# Patient Record
Sex: FEMALE | Race: BLACK OR AFRICAN AMERICAN | NOT HISPANIC OR LATINO
[De-identification: names, ages, dates, MRNs, and addresses within clinical notes are randomized per-mention and may not be internally consistent; named-entity substitution may affect disease eponyms.]

---

## 2017-02-13 ENCOUNTER — APPOINTMENT (OUTPATIENT)
Dept: GASTROENTEROLOGY | Facility: CLINIC | Age: 57
End: 2017-02-13

## 2017-02-13 VITALS
SYSTOLIC BLOOD PRESSURE: 120 MMHG | RESPIRATION RATE: 14 BRPM | HEIGHT: 67 IN | HEART RATE: 80 BPM | DIASTOLIC BLOOD PRESSURE: 74 MMHG | WEIGHT: 237 LBS | BODY MASS INDEX: 37.2 KG/M2

## 2017-02-13 DIAGNOSIS — Z80.0 FAMILY HISTORY OF MALIGNANT NEOPLASM OF DIGESTIVE ORGANS: ICD-10-CM

## 2017-03-31 ENCOUNTER — APPOINTMENT (OUTPATIENT)
Dept: GASTROENTEROLOGY | Facility: AMBULATORY MEDICAL SERVICES | Age: 57
End: 2017-03-31

## 2017-04-21 ENCOUNTER — APPOINTMENT (OUTPATIENT)
Dept: CT IMAGING | Facility: CLINIC | Age: 57
End: 2017-04-21

## 2017-04-21 ENCOUNTER — OUTPATIENT (OUTPATIENT)
Dept: OUTPATIENT SERVICES | Facility: HOSPITAL | Age: 57
LOS: 1 days | End: 2017-04-21
Payer: COMMERCIAL

## 2017-04-21 DIAGNOSIS — K43.2 INCISIONAL HERNIA WITHOUT OBSTRUCTION OR GANGRENE: ICD-10-CM

## 2017-04-21 PROCEDURE — 74150 CT ABDOMEN W/O CONTRAST: CPT

## 2017-10-06 ENCOUNTER — APPOINTMENT (OUTPATIENT)
Dept: OBGYN | Facility: CLINIC | Age: 57
End: 2017-10-06
Payer: COMMERCIAL

## 2017-10-06 VITALS
BODY MASS INDEX: 37.04 KG/M2 | WEIGHT: 236 LBS | HEIGHT: 67 IN | DIASTOLIC BLOOD PRESSURE: 85 MMHG | HEART RATE: 87 BPM | SYSTOLIC BLOOD PRESSURE: 126 MMHG

## 2017-10-06 PROCEDURE — 99213 OFFICE O/P EST LOW 20 MIN: CPT

## 2017-10-06 RX ORDER — SULFAMETHOXAZOLE AND TRIMETHOPRIM 800; 160 MG/1; MG/1
800-160 TABLET ORAL
Qty: 14 | Refills: 0 | Status: DISCONTINUED | COMMUNITY
Start: 2017-07-17

## 2017-10-06 RX ORDER — PHENTERMINE AND TOPIRAMATE 7.5; 46 MG/1; MG/1
7.5-46 CAPSULE, EXTENDED RELEASE ORAL
Qty: 14 | Refills: 0 | Status: DISCONTINUED | COMMUNITY
Start: 2017-07-25

## 2017-10-06 RX ORDER — CEFADROXIL 1000 MG/1
1 TABLET ORAL
Qty: 14 | Refills: 0 | Status: DISCONTINUED | COMMUNITY
Start: 2017-07-17

## 2017-10-09 LAB
CANDIDA VAG CYTO: NOT DETECTED
G VAGINALIS+PREV SP MTYP VAG QL MICRO: NOT DETECTED
T VAGINALIS VAG QL WET PREP: NOT DETECTED

## 2018-01-11 ENCOUNTER — TRANSCRIPTION ENCOUNTER (OUTPATIENT)
Age: 58
End: 2018-01-11

## 2018-01-30 ENCOUNTER — APPOINTMENT (OUTPATIENT)
Dept: OBGYN | Facility: CLINIC | Age: 58
End: 2018-01-30
Payer: COMMERCIAL

## 2018-01-30 VITALS
WEIGHT: 240 LBS | BODY MASS INDEX: 37.67 KG/M2 | DIASTOLIC BLOOD PRESSURE: 78 MMHG | SYSTOLIC BLOOD PRESSURE: 132 MMHG | HEIGHT: 67 IN | HEART RATE: 81 BPM

## 2018-01-30 DIAGNOSIS — Z86.018 PERSONAL HISTORY OF OTHER BENIGN NEOPLASM: ICD-10-CM

## 2018-01-30 DIAGNOSIS — N76.0 ACUTE VAGINITIS: ICD-10-CM

## 2018-01-30 PROCEDURE — 99396 PREV VISIT EST AGE 40-64: CPT

## 2018-01-31 LAB — HPV HIGH+LOW RISK DNA PNL CVX: NOT DETECTED

## 2018-02-03 LAB — CYTOLOGY CVX/VAG DOC THIN PREP: NORMAL

## 2018-02-08 ENCOUNTER — FORM ENCOUNTER (OUTPATIENT)
Age: 58
End: 2018-02-08

## 2018-02-09 ENCOUNTER — APPOINTMENT (OUTPATIENT)
Dept: ULTRASOUND IMAGING | Facility: IMAGING CENTER | Age: 58
End: 2018-02-09
Payer: COMMERCIAL

## 2018-02-09 ENCOUNTER — OUTPATIENT (OUTPATIENT)
Dept: OUTPATIENT SERVICES | Facility: HOSPITAL | Age: 58
LOS: 1 days | End: 2018-02-09
Payer: COMMERCIAL

## 2018-02-09 ENCOUNTER — APPOINTMENT (OUTPATIENT)
Dept: MAMMOGRAPHY | Facility: IMAGING CENTER | Age: 58
End: 2018-02-09
Payer: COMMERCIAL

## 2018-02-09 ENCOUNTER — APPOINTMENT (OUTPATIENT)
Dept: RADIOLOGY | Facility: IMAGING CENTER | Age: 58
End: 2018-02-09
Payer: COMMERCIAL

## 2018-02-09 DIAGNOSIS — Z13.820 ENCOUNTER FOR SCREENING FOR OSTEOPOROSIS: ICD-10-CM

## 2018-02-09 DIAGNOSIS — Z12.31 ENCOUNTER FOR SCREENING MAMMOGRAM FOR MALIGNANT NEOPLASM OF BREAST: ICD-10-CM

## 2018-02-09 PROCEDURE — 76641 ULTRASOUND BREAST COMPLETE: CPT | Mod: 26,50

## 2018-02-09 PROCEDURE — 77063 BREAST TOMOSYNTHESIS BI: CPT | Mod: 26

## 2018-02-09 PROCEDURE — 77080 DXA BONE DENSITY AXIAL: CPT | Mod: 26

## 2018-02-09 PROCEDURE — 77080 DXA BONE DENSITY AXIAL: CPT

## 2018-02-09 PROCEDURE — 77067 SCR MAMMO BI INCL CAD: CPT

## 2018-02-09 PROCEDURE — 77067 SCR MAMMO BI INCL CAD: CPT | Mod: 26

## 2018-02-09 PROCEDURE — 77063 BREAST TOMOSYNTHESIS BI: CPT

## 2018-02-09 PROCEDURE — 76641 ULTRASOUND BREAST COMPLETE: CPT

## 2018-02-12 ENCOUNTER — OTHER (OUTPATIENT)
Age: 58
End: 2018-02-12

## 2018-05-09 ENCOUNTER — TRANSCRIPTION ENCOUNTER (OUTPATIENT)
Age: 58
End: 2018-05-09

## 2018-06-11 ENCOUNTER — TRANSCRIPTION ENCOUNTER (OUTPATIENT)
Age: 58
End: 2018-06-11

## 2018-07-22 PROBLEM — Z80.0 FAMILY HISTORY OF COLON CANCER: Status: INACTIVE | Noted: 2017-02-13

## 2019-02-01 ENCOUNTER — APPOINTMENT (OUTPATIENT)
Dept: OBGYN | Facility: CLINIC | Age: 59
End: 2019-02-01
Payer: COMMERCIAL

## 2019-02-01 VITALS
DIASTOLIC BLOOD PRESSURE: 84 MMHG | SYSTOLIC BLOOD PRESSURE: 132 MMHG | WEIGHT: 248 LBS | BODY MASS INDEX: 38.92 KG/M2 | RESPIRATION RATE: 16 BRPM | HEIGHT: 67 IN | HEART RATE: 78 BPM | OXYGEN SATURATION: 98 %

## 2019-02-01 PROCEDURE — 99396 PREV VISIT EST AGE 40-64: CPT

## 2019-03-19 ENCOUNTER — RESULT CHARGE (OUTPATIENT)
Age: 59
End: 2019-03-19

## 2019-03-19 ENCOUNTER — APPOINTMENT (OUTPATIENT)
Dept: OBGYN | Facility: CLINIC | Age: 59
End: 2019-03-19
Payer: COMMERCIAL

## 2019-03-19 ENCOUNTER — OTHER (OUTPATIENT)
Age: 59
End: 2019-03-19

## 2019-03-19 ENCOUNTER — TRANSCRIPTION ENCOUNTER (OUTPATIENT)
Age: 59
End: 2019-03-19

## 2019-03-19 PROCEDURE — 99211 OFF/OP EST MAY X REQ PHY/QHP: CPT

## 2019-03-20 LAB
BILIRUB UR QL STRIP: NEGATIVE
CLARITY UR: NORMAL
COLLECTION METHOD: NORMAL
GLUCOSE UR-MCNC: NEGATIVE
HCG UR QL: 0.2 EU/DL
HGB UR QL STRIP.AUTO: NORMAL
KETONES UR-MCNC: NEGATIVE
LEUKOCYTE ESTERASE UR QL STRIP: NORMAL
NITRITE UR QL STRIP: NEGATIVE
PH UR STRIP: 5.5
PROT UR STRIP-MCNC: 100
SP GR UR STRIP: 1.02

## 2019-03-24 LAB — BACTERIA UR CULT: ABNORMAL

## 2019-03-25 ENCOUNTER — APPOINTMENT (OUTPATIENT)
Dept: RADIOLOGY | Facility: CLINIC | Age: 59
End: 2019-03-25
Payer: COMMERCIAL

## 2019-03-25 ENCOUNTER — OUTPATIENT (OUTPATIENT)
Dept: OUTPATIENT SERVICES | Facility: HOSPITAL | Age: 59
LOS: 1 days | End: 2019-03-25
Payer: COMMERCIAL

## 2019-03-25 ENCOUNTER — OTHER (OUTPATIENT)
Age: 59
End: 2019-03-25

## 2019-03-25 DIAGNOSIS — Z00.8 ENCOUNTER FOR OTHER GENERAL EXAMINATION: ICD-10-CM

## 2019-03-25 PROCEDURE — 73080 X-RAY EXAM OF ELBOW: CPT | Mod: 26,RT

## 2019-03-25 PROCEDURE — 73080 X-RAY EXAM OF ELBOW: CPT

## 2019-03-26 ENCOUNTER — OTHER (OUTPATIENT)
Age: 59
End: 2019-03-26

## 2019-07-09 ENCOUNTER — EMERGENCY (EMERGENCY)
Facility: HOSPITAL | Age: 59
LOS: 1 days | Discharge: HOME | End: 2019-07-09
Attending: EMERGENCY MEDICINE | Admitting: EMERGENCY MEDICINE
Payer: COMMERCIAL

## 2019-07-09 VITALS
RESPIRATION RATE: 20 BRPM | OXYGEN SATURATION: 100 % | SYSTOLIC BLOOD PRESSURE: 166 MMHG | DIASTOLIC BLOOD PRESSURE: 86 MMHG | TEMPERATURE: 98 F | WEIGHT: 250 LBS | HEIGHT: 68.5 IN | HEART RATE: 90 BPM

## 2019-07-09 DIAGNOSIS — M25.552 PAIN IN LEFT HIP: ICD-10-CM

## 2019-07-09 DIAGNOSIS — Z98.84 BARIATRIC SURGERY STATUS: Chronic | ICD-10-CM

## 2019-07-09 LAB
ALBUMIN SERPL ELPH-MCNC: 4.1 G/DL — SIGNIFICANT CHANGE UP (ref 3.3–5)
ALP SERPL-CCNC: 64 U/L — SIGNIFICANT CHANGE UP (ref 40–120)
ALT FLD-CCNC: 26 U/L — SIGNIFICANT CHANGE UP (ref 10–45)
ANION GAP SERPL CALC-SCNC: 11 MMOL/L — SIGNIFICANT CHANGE UP (ref 5–17)
AST SERPL-CCNC: 21 U/L — SIGNIFICANT CHANGE UP (ref 10–40)
BASOPHILS # BLD AUTO: 0 K/UL — SIGNIFICANT CHANGE UP (ref 0–0.2)
BASOPHILS NFR BLD AUTO: 0.4 % — SIGNIFICANT CHANGE UP (ref 0–2)
BILIRUB SERPL-MCNC: 0.2 MG/DL — SIGNIFICANT CHANGE UP (ref 0.2–1.2)
BUN SERPL-MCNC: 28 MG/DL — HIGH (ref 7–23)
CALCIUM SERPL-MCNC: 8.8 MG/DL — SIGNIFICANT CHANGE UP (ref 8.4–10.5)
CHLORIDE SERPL-SCNC: 103 MMOL/L — SIGNIFICANT CHANGE UP (ref 96–108)
CO2 SERPL-SCNC: 24 MMOL/L — SIGNIFICANT CHANGE UP (ref 22–31)
CREAT SERPL-MCNC: 1.28 MG/DL — SIGNIFICANT CHANGE UP (ref 0.5–1.3)
EOSINOPHIL # BLD AUTO: 0.2 K/UL — SIGNIFICANT CHANGE UP (ref 0–0.5)
EOSINOPHIL NFR BLD AUTO: 3.4 % — SIGNIFICANT CHANGE UP (ref 0–6)
ERYTHROCYTE [SEDIMENTATION RATE] IN BLOOD: 12 MM/HR — SIGNIFICANT CHANGE UP (ref 0–20)
GLUCOSE SERPL-MCNC: 89 MG/DL — SIGNIFICANT CHANGE UP (ref 70–99)
HCT VFR BLD CALC: 41.9 % — SIGNIFICANT CHANGE UP (ref 34.5–45)
HGB BLD-MCNC: 13.6 G/DL — SIGNIFICANT CHANGE UP (ref 11.5–15.5)
LYMPHOCYTES # BLD AUTO: 1.7 K/UL — SIGNIFICANT CHANGE UP (ref 1–3.3)
LYMPHOCYTES # BLD AUTO: 24.5 % — SIGNIFICANT CHANGE UP (ref 13–44)
MCHC RBC-ENTMCNC: 29.3 PG — SIGNIFICANT CHANGE UP (ref 27–34)
MCHC RBC-ENTMCNC: 32.4 GM/DL — SIGNIFICANT CHANGE UP (ref 32–36)
MCV RBC AUTO: 90.3 FL — SIGNIFICANT CHANGE UP (ref 80–100)
MONOCYTES # BLD AUTO: 0.5 K/UL — SIGNIFICANT CHANGE UP (ref 0–0.9)
MONOCYTES NFR BLD AUTO: 7.9 % — SIGNIFICANT CHANGE UP (ref 2–14)
NEUTROPHILS # BLD AUTO: 4.4 K/UL — SIGNIFICANT CHANGE UP (ref 1.8–7.4)
NEUTROPHILS NFR BLD AUTO: 63.8 % — SIGNIFICANT CHANGE UP (ref 43–77)
PLATELET # BLD AUTO: 218 K/UL — SIGNIFICANT CHANGE UP (ref 150–400)
POTASSIUM SERPL-MCNC: 4.3 MMOL/L — SIGNIFICANT CHANGE UP (ref 3.5–5.3)
POTASSIUM SERPL-SCNC: 4.3 MMOL/L — SIGNIFICANT CHANGE UP (ref 3.5–5.3)
PROT SERPL-MCNC: 6.9 G/DL — SIGNIFICANT CHANGE UP (ref 6–8.3)
RBC # BLD: 4.64 M/UL — SIGNIFICANT CHANGE UP (ref 3.8–5.2)
RBC # FLD: 12.1 % — SIGNIFICANT CHANGE UP (ref 10.3–14.5)
SODIUM SERPL-SCNC: 138 MMOL/L — SIGNIFICANT CHANGE UP (ref 135–145)
WBC # BLD: 6.8 K/UL — SIGNIFICANT CHANGE UP (ref 3.8–10.5)
WBC # FLD AUTO: 6.8 K/UL — SIGNIFICANT CHANGE UP (ref 3.8–10.5)

## 2019-07-09 PROCEDURE — 99283 EMERGENCY DEPT VISIT LOW MDM: CPT

## 2019-07-09 PROCEDURE — 80053 COMPREHEN METABOLIC PANEL: CPT

## 2019-07-09 PROCEDURE — 99284 EMERGENCY DEPT VISIT MOD MDM: CPT

## 2019-07-09 PROCEDURE — 85652 RBC SED RATE AUTOMATED: CPT

## 2019-07-09 PROCEDURE — 85027 COMPLETE CBC AUTOMATED: CPT

## 2019-07-09 PROCEDURE — 73502 X-RAY EXAM HIP UNI 2-3 VIEWS: CPT | Mod: 26,LT

## 2019-07-09 PROCEDURE — 73502 X-RAY EXAM HIP UNI 2-3 VIEWS: CPT

## 2019-07-09 RX ORDER — ACETAMINOPHEN 500 MG
975 TABLET ORAL ONCE
Refills: 0 | Status: COMPLETED | OUTPATIENT
Start: 2019-07-09 | End: 2019-07-09

## 2019-07-09 RX ADMIN — Medication 975 MILLIGRAM(S): at 17:27

## 2019-07-09 NOTE — ED ADULT NURSE NOTE - OBJECTIVE STATEMENT
Pt presents for eval of left leg pain and burning sensation, starting in left buttock area and traveling around to anterior thigh, above knee.  No trauma or fall, but states she was dancing the night before.  No redness noted.

## 2019-07-09 NOTE — ED POST DISCHARGE NOTE - NSPOSTDCCALLS_ED_ALL_ED_NU
Called patient to give results. No answer;left voice message.  
Patient called back. Gave her the biopsy results per Dr Koenig. Patient voiced understanding.  
Please call pollo and inform her that her tissue biopsies showed a pre cancerous polyp and a follow up colon is indicated in 5 years. The nodule that I biopsied was benign.     Dr. Koenig  
1

## 2019-07-09 NOTE — ED ADULT NURSE NOTE - NSIMPLEMENTINTERV_GEN_ALL_ED
Implemented All Fall with Harm Risk Interventions:  [place] to call system. Call bell, personal items and telephone within reach. Instruct patient to call for assistance. Room bathroom lighting operational. Non-slip footwear when patient is off stretcher. Physically safe environment: no spills, clutter or unnecessary equipment. Stretcher in lowest position, wheels locked, appropriate side rails in place. Provide visual cue, wrist band, yellow gown, etc. Monitor gait and stability. Monitor for mental status changes and reorient to person, place, and time. Review medications for side effects contributing to fall risk. Reinforce activity limits and safety measures with patient and family. Provide visual clues: red socks.

## 2019-07-09 NOTE — ED PROVIDER NOTE - ATTENDING CONTRIBUTION TO CARE
58y female pmh neg, comes to ed complains of pain left hip past two days. Onset the morning after "I went out dancing" No direct trauma, no fever chills shortness of breath,cp.cough,nvdc,abd pain, rash,gout, Pain worse after few min of walking rad from buttox down back of leg. No weakness numbness.PE WDWN female awake alert normocephalic atraumatic chest clear anterior & posterior abd soft +bs no mass guarding. cv no rubs, gallops or murmurs, Neruo no focal defects. left hip full rom no rash,swelling tenderness. full rom distal neuro vasc intact  Edward Connor MD, Facep

## 2019-07-09 NOTE — ED PROVIDER NOTE - OBJECTIVE STATEMENT
58 year old female with 58 year old female with pmhx HTN presents to ED c/o left hip pain radiating down the left leg x 2 days. Patient states she was out dancing the night of 7/6, 7/7 noticed upon waking was having left sided lower back pain radiating down the left lateral leg. Patient states pain is worse with ambulation. Has been taking diclofenac and flexeril without relief. Denies trauma, fevers, chills, chest pain, sob, abd pain, n/v/d, urinary symptoms

## 2019-07-09 NOTE — ED PROVIDER NOTE - NSFOLLOWUPINSTRUCTIONS_ED_ALL_ED_FT
1. Stay hydrated. Ice 20mins on, 40 mins off for first 48hrs of onset of pain, after that heat in cycle: 20 mins on, 40 mins off. Avoid strenuous activity, twisting and heavy lifting.  2. Take Ibuprofen 600mg every 6hrs for pain as needed- take with food. Alternate Ibuprofen with Tylenol 1g every 6 hours  3. Follow up with your PCP  24-48 hours  4. For continued pain follow up with orthopedics for evaluation   5. Return to ED for worsening of symptoms including fever, weakness, numbness, bowel/urine incontinence and all other concerns.

## 2019-07-09 NOTE — ED PROVIDER NOTE - PROGRESS NOTE DETAILS
Endorsed to Dr SALINA Connor MD, Facep Called lab to see why ESR not released, states pt was on break and didn't see it so running now, will; take 45-1hr. Pt asking to be d./c home. Labs and vitals otherwise unremarkable. Will d/c with ortho follow up and return precautions. Will call if abnl. Dr. Haynes aware and agrees with plan   Vianca Goyal PA-C

## 2019-07-09 NOTE — ED POST DISCHARGE NOTE - OTHER COMMUNICATION
Spoke to patient and informed of normal ESR. Advised to continue w/ d/c plan as advised. - Vianca Goyal PA-C

## 2019-07-09 NOTE — ED PROVIDER NOTE - PHYSICAL EXAMINATION
CONSTITUTIONAL: Patient is awake, alert and oriented x 3. Patient is well appearing and in no acute distress.  HEAD: NCAT,   EYES: PERRL b/l, EOMI,   ENT: Airway patent, Nasal mucosa clear. Mouth with normal mucosa. Throat has no vesicles, no oropharyngeal exudates and uvula is midline.  LUNGS: CTA B/L,  HEART: RRR.+S1S2 no murmurs,   ABDOMEN: Soft nd/nt+bs no rebound or guarding.   EXTREMITY: no edema or calf tenderness b/l, FROM upper and lower ext b/l. Pelvis is stable, no midline cervical, thoracic or lumbar ttp   SKIN: with no rash or lesions.   NEURO: Cn3-12 grossly intact. Strength5/5UE/LE.NmlSensation.

## 2019-07-11 PROBLEM — K21.9 GASTRO-ESOPHAGEAL REFLUX DISEASE WITHOUT ESOPHAGITIS: Chronic | Status: ACTIVE | Noted: 2019-07-09

## 2019-07-11 PROBLEM — I10 ESSENTIAL (PRIMARY) HYPERTENSION: Chronic | Status: ACTIVE | Noted: 2019-07-09

## 2019-07-18 ENCOUNTER — APPOINTMENT (OUTPATIENT)
Dept: ORTHOPEDIC SURGERY | Facility: CLINIC | Age: 59
End: 2019-07-18

## 2019-10-16 ENCOUNTER — FORM ENCOUNTER (OUTPATIENT)
Age: 59
End: 2019-10-16

## 2019-10-17 ENCOUNTER — APPOINTMENT (OUTPATIENT)
Dept: MAMMOGRAPHY | Facility: IMAGING CENTER | Age: 59
End: 2019-10-17
Payer: COMMERCIAL

## 2019-10-17 ENCOUNTER — APPOINTMENT (OUTPATIENT)
Dept: ULTRASOUND IMAGING | Facility: IMAGING CENTER | Age: 59
End: 2019-10-17
Payer: COMMERCIAL

## 2019-10-17 ENCOUNTER — OUTPATIENT (OUTPATIENT)
Dept: OUTPATIENT SERVICES | Facility: HOSPITAL | Age: 59
LOS: 1 days | End: 2019-10-17
Payer: COMMERCIAL

## 2019-10-17 DIAGNOSIS — Z12.39 ENCOUNTER FOR OTHER SCREENING FOR MALIGNANT NEOPLASM OF BREAST: ICD-10-CM

## 2019-10-17 DIAGNOSIS — Z98.84 BARIATRIC SURGERY STATUS: Chronic | ICD-10-CM

## 2019-10-17 PROCEDURE — 77067 SCR MAMMO BI INCL CAD: CPT | Mod: 26

## 2019-10-17 PROCEDURE — 77063 BREAST TOMOSYNTHESIS BI: CPT | Mod: 26

## 2019-10-17 PROCEDURE — 77067 SCR MAMMO BI INCL CAD: CPT

## 2019-10-17 PROCEDURE — 76641 ULTRASOUND BREAST COMPLETE: CPT | Mod: 26,50

## 2019-10-17 PROCEDURE — 76641 ULTRASOUND BREAST COMPLETE: CPT

## 2019-10-17 PROCEDURE — 77063 BREAST TOMOSYNTHESIS BI: CPT

## 2019-12-24 ENCOUNTER — OUTPATIENT (OUTPATIENT)
Dept: OUTPATIENT SERVICES | Facility: HOSPITAL | Age: 59
LOS: 1 days | End: 2019-12-24
Payer: COMMERCIAL

## 2019-12-24 ENCOUNTER — APPOINTMENT (OUTPATIENT)
Dept: CT IMAGING | Facility: IMAGING CENTER | Age: 59
End: 2019-12-24
Payer: COMMERCIAL

## 2019-12-24 DIAGNOSIS — Z98.84 BARIATRIC SURGERY STATUS: ICD-10-CM

## 2019-12-24 DIAGNOSIS — Z98.84 BARIATRIC SURGERY STATUS: Chronic | ICD-10-CM

## 2019-12-24 PROCEDURE — 74177 CT ABD & PELVIS W/CONTRAST: CPT | Mod: 26

## 2019-12-24 PROCEDURE — 82565 ASSAY OF CREATININE: CPT

## 2019-12-24 PROCEDURE — 74177 CT ABD & PELVIS W/CONTRAST: CPT

## 2020-01-16 ENCOUNTER — APPOINTMENT (OUTPATIENT)
Dept: INTERNAL MEDICINE | Facility: CLINIC | Age: 60
End: 2020-01-16
Payer: COMMERCIAL

## 2020-01-16 ENCOUNTER — NON-APPOINTMENT (OUTPATIENT)
Age: 60
End: 2020-01-16

## 2020-01-16 VITALS
WEIGHT: 228 LBS | SYSTOLIC BLOOD PRESSURE: 158 MMHG | HEIGHT: 67 IN | BODY MASS INDEX: 35.79 KG/M2 | HEART RATE: 66 BPM | DIASTOLIC BLOOD PRESSURE: 80 MMHG | RESPIRATION RATE: 15 BRPM

## 2020-01-16 PROCEDURE — 93000 ELECTROCARDIOGRAM COMPLETE: CPT

## 2020-01-16 PROCEDURE — 99204 OFFICE O/P NEW MOD 45 MIN: CPT | Mod: 25

## 2020-01-16 PROCEDURE — 90662 IIV NO PRSV INCREASED AG IM: CPT

## 2020-01-16 PROCEDURE — 36415 COLL VENOUS BLD VENIPUNCTURE: CPT

## 2020-01-16 PROCEDURE — 90471 IMMUNIZATION ADMIN: CPT

## 2020-01-16 RX ORDER — CIPROFLOXACIN HYDROCHLORIDE 500 MG/1
500 TABLET, FILM COATED ORAL TWICE DAILY
Qty: 14 | Refills: 0 | Status: DISCONTINUED | COMMUNITY
Start: 2019-03-19 | End: 2020-01-16

## 2020-01-16 RX ORDER — PANTOPRAZOLE 40 MG/1
40 TABLET, DELAYED RELEASE ORAL
Refills: 0 | Status: DISCONTINUED | COMMUNITY
End: 2020-01-16

## 2020-01-16 RX ORDER — SIMVASTATIN 20 MG/1
20 TABLET, FILM COATED ORAL
Refills: 0 | Status: DISCONTINUED | COMMUNITY
End: 2020-01-16

## 2020-01-16 NOTE — PHYSICAL EXAM
[No Acute Distress] : no acute distress [Well Nourished] : well nourished [Well Developed] : well developed [Well-Appearing] : well-appearing [Normal Sclera/Conjunctiva] : normal sclera/conjunctiva [PERRL] : pupils equal round and reactive to light [Normal Outer Ear/Nose] : the outer ears and nose were normal in appearance [EOMI] : extraocular movements intact [Normal Oropharynx] : the oropharynx was normal [No Lymphadenopathy] : no lymphadenopathy [No JVD] : no jugular venous distention [Thyroid Normal, No Nodules] : the thyroid was normal and there were no nodules present [Supple] : supple [No Respiratory Distress] : no respiratory distress  [No Accessory Muscle Use] : no accessory muscle use [Clear to Auscultation] : lungs were clear to auscultation bilaterally [Normal Rate] : normal rate  [Regular Rhythm] : with a regular rhythm [No Murmur] : no murmur heard [Normal S1, S2] : normal S1 and S2 [No Carotid Bruits] : no carotid bruits [No Abdominal Bruit] : a ~M bruit was not heard ~T in the abdomen [Pedal Pulses Present] : the pedal pulses are present [No Edema] : there was no peripheral edema [No Extremity Clubbing/Cyanosis] : no extremity clubbing/cyanosis [No Palpable Aorta] : no palpable aorta [Soft] : abdomen soft [Non-distended] : non-distended [Non Tender] : non-tender [No Masses] : no abdominal mass palpated [No HSM] : no HSM [Normal Bowel Sounds] : normal bowel sounds [Normal Anterior Cervical Nodes] : no anterior cervical lymphadenopathy [Normal Posterior Cervical Nodes] : no posterior cervical lymphadenopathy [No CVA Tenderness] : no CVA  tenderness [No Spinal Tenderness] : no spinal tenderness [No Joint Swelling] : no joint swelling [Grossly Normal Strength/Tone] : grossly normal strength/tone [Coordination Grossly Intact] : coordination grossly intact [No Rash] : no rash [No Focal Deficits] : no focal deficits [Normal Gait] : normal gait [Deep Tendon Reflexes (DTR)] : deep tendon reflexes were 2+ and symmetric [Normal Affect] : the affect was normal [Alert and Oriented x3] : oriented to person, place, and time [Normal Insight/Judgement] : insight and judgment were intact [de-identified] : Dentures [de-identified] : Varicose veins [de-identified] : Multiple scars throught abdomianal wall but no hernias [de-identified] : Trouble hopping because of weight

## 2020-01-16 NOTE — HISTORY OF PRESENT ILLNESS
[FreeTextEntry1] : 60 yo woman presents to establish care.  She is s/p a Gastric Bypass in august and is having abdominal pain. 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 She has lost about 25 lbs since the procedure.  Unfortunately, after being fine in September-she began to get recurrent mid abdominal pain which radiates to her right side.  She is being evaluated for the pain and it has improved over the past two weeks.  It is not necessarily post prandial.  \par She denies vomiting although she does get nauseated and vagal with the pain but has no diarrhea.  She denies a relationship to urinating, hematuria, or dysuria.  She is waiting for a sonogram.l\par Otherwise, she notes problems with sleep and has a new nocturnal schedule as she has just become a  working the Midnight to 8 shift.  She has a  history of BRAULIO for which she is not using BIPAP.

## 2020-01-16 NOTE — HEALTH RISK ASSESSMENT
[Fair] : ~his/her~ current health as fair  [Good] : ~his/her~  mood as  good [Intercurrent Urgi Care visits] : went to urgent care [No] : No [No falls in past year] : Patient reported no falls in the past year [0] : 2) Feeling down, depressed, or hopeless: Not at all (0) [Patient reported colonoscopy was normal] : Patient reported colonoscopy was normal [Hepatitis C test offered] : Hepatitis C test offered [None] : None [# of Members in Household ___] :  household currently consist of [unfilled] member(s) [Employed] : employed [With Significant Other] : lives with significant other [High School] : high school [# Of Children ___] : has [unfilled] children [] :  [Sexually Active] : sexually active [Feels Safe at Home] : Feels safe at home [Fully functional (bathing, dressing, toileting, transferring, walking, feeding)] : Fully functional (bathing, dressing, toileting, transferring, walking, feeding) [Fully functional (using the telephone, shopping, preparing meals, housekeeping, doing laundry, using] : Fully functional and needs no help or supervision to perform IADLs (using the telephone, shopping, preparing meals, housekeeping, doing laundry, using transportation, managing medications and managing finances) [Carbon Monoxide Detector] : carbon monoxide detector [Smoke Detector] : smoke detector [Reports normal functional visual acuity (ie: able to read med bottle)] : Reports normal functional visual acuity [Safety elements used in home] : safety elements used in home [Seat Belt] :  uses seat belt [Sunscreen] : uses sunscreen [With Patient/Caregiver] : With Patient/Caregiver [Relationship: ___] : Relationship: [unfilled] [Name: ___] : Health Care Proxy's Name: [unfilled]  [de-identified] : for the Flu [FreeTextEntry1] : New job and bell pain [YearQuit] : Stopped smoking more than 10 yeas [de-identified] : Ob:Gyn Dr. Vides; Dr. [address] [de-identified] : Occ social drink [de-identified] : Limited diet post bypass [de-identified] : Work related exercise  [NEO1Bzcpt] : 0 [Behavior] : denies difficulty with behavior [Change in mental status noted] : No change in mental status noted [Learning/Retaining New Information] : denies difficulty learning/retaining new information [Handling Complex Tasks] : denies difficulty handling complex tasks [Reasoning] : denies difficulty with reasoning [Spatial Ability and Orientation] : denies difficulty with spatial ability and orientation [High Risk Behavior] : no high risk behavior [Reports changes in hearing] : Reports no changes in hearing [Reports changes in vision] : Reports no changes in vision [MammogramDate] : 10/19 [BoneDensityDate] : 10/19 [ColonoscopyComments] :  [ColonoscopyDate] : 6/2017 [HIVComments] : HIV already done in the past [FreeTextEntry3] : [place] [de-identified] : Dentures [AdvancecareDate] : 1/16/20

## 2020-01-17 ENCOUNTER — MED ADMIN CHARGE (OUTPATIENT)
Age: 60
End: 2020-01-17

## 2020-01-20 LAB
25(OH)D3 SERPL-MCNC: 51.7 NG/ML
ALBUMIN SERPL ELPH-MCNC: 4.4 G/DL
ALP BLD-CCNC: 62 U/L
ALT SERPL-CCNC: 15 U/L
ANION GAP SERPL CALC-SCNC: 16 MMOL/L
APPEARANCE: CLEAR
AST SERPL-CCNC: 19 U/L
BACTERIA: NEGATIVE
BASOPHILS # BLD AUTO: 0.03 K/UL
BASOPHILS NFR BLD AUTO: 0.4 %
BILIRUB SERPL-MCNC: 0.3 MG/DL
BILIRUBIN URINE: NEGATIVE
BLOOD URINE: NEGATIVE
BUN SERPL-MCNC: 20 MG/DL
CALCIUM SERPL-MCNC: 9.5 MG/DL
CHLORIDE SERPL-SCNC: 101 MMOL/L
CHOLEST SERPL-MCNC: 232 MG/DL
CHOLEST/HDLC SERPL: 2.2 RATIO
CO2 SERPL-SCNC: 25 MMOL/L
COLOR: YELLOW
CREAT SERPL-MCNC: 0.86 MG/DL
EOSINOPHIL # BLD AUTO: 0.11 K/UL
EOSINOPHIL NFR BLD AUTO: 1.5 %
GLUCOSE QUALITATIVE U: NEGATIVE
GLUCOSE SERPL-MCNC: 71 MG/DL
HCT VFR BLD CALC: 41.9 %
HCV AB SER QL: NONREACTIVE
HCV S/CO RATIO: 0.16 S/CO
HDLC SERPL-MCNC: 105 MG/DL
HGB BLD-MCNC: 12.8 G/DL
HYALINE CASTS: 1 /LPF
IMM GRANULOCYTES NFR BLD AUTO: 0.4 %
KETONES URINE: NEGATIVE
LDLC SERPL CALC-MCNC: 112 MG/DL
LEUKOCYTE ESTERASE URINE: NEGATIVE
LYMPHOCYTES # BLD AUTO: 1.48 K/UL
LYMPHOCYTES NFR BLD AUTO: 20.1 %
MAN DIFF?: NORMAL
MCHC RBC-ENTMCNC: 28.4 PG
MCHC RBC-ENTMCNC: 30.5 GM/DL
MCV RBC AUTO: 92.9 FL
MICROSCOPIC-UA: NORMAL
MONOCYTES # BLD AUTO: 0.64 K/UL
MONOCYTES NFR BLD AUTO: 8.7 %
NEUTROPHILS # BLD AUTO: 5.07 K/UL
NEUTROPHILS NFR BLD AUTO: 68.9 %
NITRITE URINE: NEGATIVE
PH URINE: 6
PLATELET # BLD AUTO: 269 K/UL
POTASSIUM SERPL-SCNC: 4.7 MMOL/L
PROT SERPL-MCNC: 6.8 G/DL
PROTEIN URINE: NEGATIVE
RBC # BLD: 4.51 M/UL
RBC # FLD: 13.5 %
RED BLOOD CELLS URINE: 2 /HPF
SODIUM SERPL-SCNC: 142 MMOL/L
SPECIFIC GRAVITY URINE: 1.02
SQUAMOUS EPITHELIAL CELLS: 1 /HPF
T4 SERPL-MCNC: 5.5 UG/DL
TRIGL SERPL-MCNC: 72 MG/DL
TSH SERPL-ACNC: 0.43 UIU/ML
UROBILINOGEN URINE: NORMAL
WBC # FLD AUTO: 7.36 K/UL
WHITE BLOOD CELLS URINE: 2 /HPF

## 2020-01-25 ENCOUNTER — OUTPATIENT (OUTPATIENT)
Dept: OUTPATIENT SERVICES | Facility: HOSPITAL | Age: 60
LOS: 1 days | End: 2020-01-25
Payer: COMMERCIAL

## 2020-01-25 ENCOUNTER — APPOINTMENT (OUTPATIENT)
Dept: ULTRASOUND IMAGING | Facility: IMAGING CENTER | Age: 60
End: 2020-01-25
Payer: COMMERCIAL

## 2020-01-25 DIAGNOSIS — Z00.8 ENCOUNTER FOR OTHER GENERAL EXAMINATION: ICD-10-CM

## 2020-01-25 DIAGNOSIS — Z98.84 BARIATRIC SURGERY STATUS: Chronic | ICD-10-CM

## 2020-01-25 PROCEDURE — 76705 ECHO EXAM OF ABDOMEN: CPT | Mod: 26,RT

## 2020-01-25 PROCEDURE — 76705 ECHO EXAM OF ABDOMEN: CPT

## 2020-02-19 ENCOUNTER — APPOINTMENT (OUTPATIENT)
Dept: INTERNAL MEDICINE | Facility: CLINIC | Age: 60
End: 2020-02-19
Payer: COMMERCIAL

## 2020-02-19 VITALS
BODY MASS INDEX: 35.52 KG/M2 | HEIGHT: 66 IN | WEIGHT: 221 LBS | DIASTOLIC BLOOD PRESSURE: 80 MMHG | TEMPERATURE: 96.9 F | SYSTOLIC BLOOD PRESSURE: 120 MMHG

## 2020-02-19 PROCEDURE — 99214 OFFICE O/P EST MOD 30 MIN: CPT

## 2020-02-19 NOTE — HISTORY OF PRESENT ILLNESS
[FreeTextEntry1] : 60 yo woman with obesity s/p bypass who presents for f/u of her hypertension and abdominal pain. [de-identified] : 60 yo woman with morbid obestiy arthur for the first time previously complaining of abdominal pain which has improved, she has had none for the past 2 weeks and when she last had it, it was more in hre right flank,.  She has been taking her medication and had her bdominal sonogram which aside frrom some liver cysts was normal.

## 2020-02-19 NOTE — PHYSICAL EXAM
[No Acute Distress] : no acute distress [Well Developed] : well developed [Well Nourished] : well nourished [PERRL] : pupils equal round and reactive to light [Well-Appearing] : well-appearing [Normal Sclera/Conjunctiva] : normal sclera/conjunctiva [EOMI] : extraocular movements intact [Normal Outer Ear/Nose] : the outer ears and nose were normal in appearance [No JVD] : no jugular venous distention [Normal Oropharynx] : the oropharynx was normal [No Lymphadenopathy] : no lymphadenopathy [Supple] : supple [Thyroid Normal, No Nodules] : the thyroid was normal and there were no nodules present [No Respiratory Distress] : no respiratory distress  [No Accessory Muscle Use] : no accessory muscle use [Clear to Auscultation] : lungs were clear to auscultation bilaterally [Normal Rate] : normal rate  [Regular Rhythm] : with a regular rhythm [Normal S1, S2] : normal S1 and S2 [No Murmur] : no murmur heard [No Carotid Bruits] : no carotid bruits [No Abdominal Bruit] : a ~M bruit was not heard ~T in the abdomen [No Varicosities] : no varicosities [Pedal Pulses Present] : the pedal pulses are present [No Edema] : there was no peripheral edema [No Extremity Clubbing/Cyanosis] : no extremity clubbing/cyanosis [Soft] : abdomen soft [No Palpable Aorta] : no palpable aorta [Non Tender] : non-tender [Non-distended] : non-distended [No Masses] : no abdominal mass palpated [Normal Bowel Sounds] : normal bowel sounds [No HSM] : no HSM [Normal Posterior Cervical Nodes] : no posterior cervical lymphadenopathy [Normal Anterior Cervical Nodes] : no anterior cervical lymphadenopathy [No CVA Tenderness] : no CVA  tenderness [No Spinal Tenderness] : no spinal tenderness [No Joint Swelling] : no joint swelling [Grossly Normal Strength/Tone] : grossly normal strength/tone [No Rash] : no rash [Coordination Grossly Intact] : coordination grossly intact [No Focal Deficits] : no focal deficits [Normal Gait] : normal gait [Deep Tendon Reflexes (DTR)] : deep tendon reflexes were 2+ and symmetric [Normal Affect] : the affect was normal [Normal Insight/Judgement] : insight and judgment were intact

## 2020-06-16 ENCOUNTER — APPOINTMENT (OUTPATIENT)
Dept: INTERNAL MEDICINE | Facility: CLINIC | Age: 60
End: 2020-06-16

## 2020-07-06 ENCOUNTER — TRANSCRIPTION ENCOUNTER (OUTPATIENT)
Age: 60
End: 2020-07-06

## 2020-07-21 ENCOUNTER — TRANSCRIPTION ENCOUNTER (OUTPATIENT)
Age: 60
End: 2020-07-21

## 2020-07-28 ENCOUNTER — APPOINTMENT (OUTPATIENT)
Dept: INTERNAL MEDICINE | Facility: CLINIC | Age: 60
End: 2020-07-28
Payer: COMMERCIAL

## 2020-07-28 VITALS — SYSTOLIC BLOOD PRESSURE: 120 MMHG | TEMPERATURE: 96.9 F | DIASTOLIC BLOOD PRESSURE: 80 MMHG

## 2020-07-28 VITALS — WEIGHT: 228 LBS | BODY MASS INDEX: 35.79 KG/M2 | HEIGHT: 67 IN

## 2020-07-28 LAB
BASOPHILS # BLD AUTO: 0.03 K/UL
BASOPHILS NFR BLD AUTO: 0.6 %
EOSINOPHIL # BLD AUTO: 0.14 K/UL
EOSINOPHIL NFR BLD AUTO: 2.9 %
HCT VFR BLD CALC: 45.7 %
HGB BLD-MCNC: 13.6 G/DL
IMM GRANULOCYTES NFR BLD AUTO: 0.2 %
LYMPHOCYTES # BLD AUTO: 1.49 K/UL
LYMPHOCYTES NFR BLD AUTO: 30.4 %
MAN DIFF?: NORMAL
MCHC RBC-ENTMCNC: 28.1 PG
MCHC RBC-ENTMCNC: 29.8 GM/DL
MCV RBC AUTO: 94.4 FL
MONOCYTES # BLD AUTO: 0.48 K/UL
MONOCYTES NFR BLD AUTO: 9.8 %
NEUTROPHILS # BLD AUTO: 2.75 K/UL
NEUTROPHILS NFR BLD AUTO: 56.1 %
PLATELET # BLD AUTO: 258 K/UL
RBC # BLD: 4.84 M/UL
RBC # FLD: 12.9 %
URATE SERPL-MCNC: 3.4 MG/DL
WBC # FLD AUTO: 4.9 K/UL

## 2020-07-28 PROCEDURE — 36415 COLL VENOUS BLD VENIPUNCTURE: CPT

## 2020-07-28 PROCEDURE — 99213 OFFICE O/P EST LOW 20 MIN: CPT | Mod: 25

## 2020-07-29 LAB
ALBUMIN SERPL ELPH-MCNC: 4.3 G/DL
ALP BLD-CCNC: 74 U/L
ALT SERPL-CCNC: 24 U/L
ANION GAP SERPL CALC-SCNC: 15 MMOL/L
AST SERPL-CCNC: 22 U/L
BILIRUB SERPL-MCNC: 0.3 MG/DL
BUN SERPL-MCNC: 20 MG/DL
CALCIUM SERPL-MCNC: 9.1 MG/DL
CHLORIDE SERPL-SCNC: 103 MMOL/L
CO2 SERPL-SCNC: 23 MMOL/L
CREAT SERPL-MCNC: 0.96 MG/DL
GLUCOSE SERPL-MCNC: 90 MG/DL
POTASSIUM SERPL-SCNC: 4.7 MMOL/L
PROT SERPL-MCNC: 6.6 G/DL
SODIUM SERPL-SCNC: 142 MMOL/L

## 2020-08-03 ENCOUNTER — TRANSCRIPTION ENCOUNTER (OUTPATIENT)
Age: 60
End: 2020-08-03

## 2020-08-03 LAB
APPEARANCE: CLEAR
BILIRUBIN URINE: NEGATIVE
BLOOD URINE: NEGATIVE
COLOR: YELLOW
GLUCOSE QUALITATIVE U: NEGATIVE
KETONES URINE: NEGATIVE
LEUKOCYTE ESTERASE URINE: NEGATIVE
NITRITE URINE: NEGATIVE
PH URINE: 5.5
PROTEIN URINE: NORMAL
SPECIFIC GRAVITY URINE: 1.02
TSH SERPL-ACNC: 0.86 UIU/ML
UROBILINOGEN URINE: NORMAL

## 2020-08-20 ENCOUNTER — TRANSCRIPTION ENCOUNTER (OUTPATIENT)
Age: 60
End: 2020-08-20

## 2020-09-02 ENCOUNTER — TRANSCRIPTION ENCOUNTER (OUTPATIENT)
Age: 60
End: 2020-09-02

## 2020-09-15 ENCOUNTER — TRANSCRIPTION ENCOUNTER (OUTPATIENT)
Age: 60
End: 2020-09-15

## 2020-09-16 ENCOUNTER — APPOINTMENT (OUTPATIENT)
Dept: INTERNAL MEDICINE | Facility: CLINIC | Age: 60
End: 2020-09-16
Payer: COMMERCIAL

## 2020-09-16 PROCEDURE — G0008: CPT

## 2020-09-16 PROCEDURE — 90686 IIV4 VACC NO PRSV 0.5 ML IM: CPT

## 2020-10-12 ENCOUNTER — APPOINTMENT (OUTPATIENT)
Dept: INTERNAL MEDICINE | Facility: CLINIC | Age: 60
End: 2020-10-12
Payer: COMMERCIAL

## 2020-10-12 PROCEDURE — 99214 OFFICE O/P EST MOD 30 MIN: CPT

## 2020-10-12 RX ORDER — CEFADROXIL 500 MG/1
500 CAPSULE ORAL
Refills: 0 | Status: DISCONTINUED | COMMUNITY
Start: 2020-07-28 | End: 2020-10-12

## 2020-10-12 NOTE — PHYSICAL EXAM
[No Acute Distress] : no acute distress [Normal Sclera/Conjunctiva] : normal sclera/conjunctiva [Normal Oropharynx] : the oropharynx was normal [No JVD] : no jugular venous distention [No Respiratory Distress] : no respiratory distress  [Clear to Auscultation] : lungs were clear to auscultation bilaterally [Normal Percussion] : the chest was normal to percussion [Normal Rate] : normal rate  [Regular Rhythm] : with a regular rhythm [Normal S1, S2] : normal S1 and S2 [No Murmur] : no murmur heard [No Edema] : there was no peripheral edema [Soft] : abdomen soft [Non Tender] : non-tender [No CVA Tenderness] : no CVA  tenderness [No Spinal Tenderness] : no spinal tenderness [Speech Grossly Normal] : speech grossly normal [de-identified] : Obese [de-identified] : Somewhat anxious by her own account

## 2020-10-12 NOTE — HISTORY OF PRESENT ILLNESS
[FreeTextEntry1] : 58 yo woman who is in for an interval visti.  She had been having abdominal pain but has been diagnosed with an ulcer. [de-identified] : 60 yo woman with hypertension and obesity, who had been complaining of diffuse stomach pain and has now been found to have a gastric ulcer (likely marginal) and has been started on Pantoprazole and Sulcrafate for control.  She is scheduled to have a repeat EGD in about one month.\par Her anxiety is better but not totally resolved\par She is working.

## 2020-11-12 ENCOUNTER — TRANSCRIPTION ENCOUNTER (OUTPATIENT)
Age: 60
End: 2020-11-12

## 2020-12-03 ENCOUNTER — TRANSCRIPTION ENCOUNTER (OUTPATIENT)
Age: 60
End: 2020-12-03

## 2020-12-30 ENCOUNTER — APPOINTMENT (OUTPATIENT)
Dept: INTERNAL MEDICINE | Facility: CLINIC | Age: 60
End: 2020-12-30
Payer: COMMERCIAL

## 2020-12-30 PROCEDURE — 99072 ADDL SUPL MATRL&STAF TM PHE: CPT

## 2020-12-30 PROCEDURE — 36415 COLL VENOUS BLD VENIPUNCTURE: CPT

## 2021-01-04 LAB
25(OH)D3 SERPL-MCNC: 43.3 NG/ML
ALBUMIN SERPL ELPH-MCNC: 4.4 G/DL
ALP BLD-CCNC: 87 U/L
ALT SERPL-CCNC: 28 U/L
ANION GAP SERPL CALC-SCNC: 13 MMOL/L
APPEARANCE: CLEAR
AST SERPL-CCNC: 25 U/L
BASOPHILS # BLD AUTO: 0.04 K/UL
BASOPHILS NFR BLD AUTO: 0.7 %
BILIRUB SERPL-MCNC: 0.5 MG/DL
BILIRUBIN URINE: NEGATIVE
BLOOD URINE: NEGATIVE
BUN SERPL-MCNC: 16 MG/DL
CALCIUM SERPL-MCNC: 9.8 MG/DL
CHLORIDE SERPL-SCNC: 103 MMOL/L
CHOLEST SERPL-MCNC: 273 MG/DL
CO2 SERPL-SCNC: 26 MMOL/L
COLOR: YELLOW
CREAT SERPL-MCNC: 0.99 MG/DL
EOSINOPHIL # BLD AUTO: 0.27 K/UL
EOSINOPHIL NFR BLD AUTO: 4.4 %
GLUCOSE QUALITATIVE U: NEGATIVE
GLUCOSE SERPL-MCNC: 82 MG/DL
HBV CORE IGG+IGM SER QL: NONREACTIVE
HCT VFR BLD CALC: 47.1 %
HDLC SERPL-MCNC: 121 MG/DL
HGB BLD-MCNC: 14.2 G/DL
IMM GRANULOCYTES NFR BLD AUTO: 0.2 %
KETONES URINE: NEGATIVE
LDLC SERPL CALC-MCNC: 139 MG/DL
LEUKOCYTE ESTERASE URINE: NEGATIVE
LYMPHOCYTES # BLD AUTO: 1.48 K/UL
LYMPHOCYTES NFR BLD AUTO: 24.1 %
M TB IFN-G BLD-IMP: NEGATIVE
MAN DIFF?: NORMAL
MCHC RBC-ENTMCNC: 28.1 PG
MCHC RBC-ENTMCNC: 30.1 GM/DL
MCV RBC AUTO: 93.3 FL
MEV IGG FLD QL IA: >300 AU/ML
MEV IGG+IGM SER-IMP: POSITIVE
MONOCYTES # BLD AUTO: 0.48 K/UL
MONOCYTES NFR BLD AUTO: 7.8 %
NEUTROPHILS # BLD AUTO: 3.85 K/UL
NEUTROPHILS NFR BLD AUTO: 62.8 %
NITRITE URINE: NEGATIVE
NONHDLC SERPL-MCNC: 151 MG/DL
PH URINE: 6
PLATELET # BLD AUTO: 255 K/UL
POTASSIUM SERPL-SCNC: 4.5 MMOL/L
PROT SERPL-MCNC: 7.3 G/DL
PROTEIN URINE: NEGATIVE
QUANTIFERON TB PLUS MITOGEN MINUS NIL: >10 IU/ML
QUANTIFERON TB PLUS NIL: 0.02 IU/ML
QUANTIFERON TB PLUS TB1 MINUS NIL: 0 IU/ML
QUANTIFERON TB PLUS TB2 MINUS NIL: 0 IU/ML
RBC # BLD: 5.05 M/UL
RBC # FLD: 13.4 %
RUBV IGG FLD-ACNC: 15.5 INDEX
RUBV IGG SER-IMP: POSITIVE
SARS-COV-2 IGG SERPL IA-ACNC: 0.09 INDEX
SARS-COV-2 IGG SERPL QL IA: NEGATIVE
SODIUM SERPL-SCNC: 141 MMOL/L
SPECIFIC GRAVITY URINE: 1.02
T4 SERPL-MCNC: 5.5 UG/DL
TRIGL SERPL-MCNC: 61 MG/DL
TSH SERPL-ACNC: 0.88 UIU/ML
UROBILINOGEN URINE: NORMAL
VZV AB TITR SER: POSITIVE
VZV IGG SER IF-ACNC: 363.6 INDEX
WBC # FLD AUTO: 6.13 K/UL

## 2021-01-14 ENCOUNTER — NON-APPOINTMENT (OUTPATIENT)
Age: 61
End: 2021-01-14

## 2021-01-14 ENCOUNTER — APPOINTMENT (OUTPATIENT)
Dept: INTERNAL MEDICINE | Facility: CLINIC | Age: 61
End: 2021-01-14
Payer: COMMERCIAL

## 2021-01-14 VITALS
OXYGEN SATURATION: 100 % | BODY MASS INDEX: 36.26 KG/M2 | SYSTOLIC BLOOD PRESSURE: 180 MMHG | TEMPERATURE: 98 F | RESPIRATION RATE: 16 BRPM | WEIGHT: 231 LBS | DIASTOLIC BLOOD PRESSURE: 82 MMHG | HEIGHT: 67 IN | HEART RATE: 98 BPM

## 2021-01-14 DIAGNOSIS — K25.3 ACUTE GASTRIC ULCER W/OUT HEMORRHAGE OR PERFORATION: ICD-10-CM

## 2021-01-14 PROCEDURE — 99396 PREV VISIT EST AGE 40-64: CPT | Mod: 25

## 2021-01-14 PROCEDURE — 99072 ADDL SUPL MATRL&STAF TM PHE: CPT

## 2021-01-14 PROCEDURE — 99215 OFFICE O/P EST HI 40 MIN: CPT | Mod: 25

## 2021-01-14 PROCEDURE — 93000 ELECTROCARDIOGRAM COMPLETE: CPT

## 2021-01-14 NOTE — PLAN
[FreeTextEntry1] : 1-Add Chlrothalidone 12.5 mg po daily\par 2-Increase Buspirone to 20 mg po BID\par 3-Consider addition of SSRI to control anxiety as well\par 4-To attend AA meetings again\par To consider further intervention for Alcohol Overuse

## 2021-01-14 NOTE — PHYSICAL EXAM
[No Acute Distress] : no acute distress [Well Nourished] : well nourished [Well-Appearing] : well-appearing [Normal Sclera/Conjunctiva] : normal sclera/conjunctiva [EOMI] : extraocular movements intact [Normal Outer Ear/Nose] : the outer ears and nose were normal in appearance [No JVD] : no jugular venous distention [Supple] : supple [No Respiratory Distress] : no respiratory distress  [No Accessory Muscle Use] : no accessory muscle use [Clear to Auscultation] : lungs were clear to auscultation bilaterally [Normal Rate] : normal rate  [Regular Rhythm] : with a regular rhythm [Normal S1, S2] : normal S1 and S2 [Pedal Pulses Present] : the pedal pulses are present [No Edema] : there was no peripheral edema [No Masses] : no palpable masses [Soft] : abdomen soft [Non Tender] : non-tender [Non-distended] : non-distended [Normal] : no posterior cervical lymphadenopathy and no anterior cervical lymphadenopathy [No CVA Tenderness] : no CVA  tenderness [Scoliosis] : scoliosis [No Rash] : no rash [No Focal Deficits] : no focal deficits [Alert and Oriented x3] : oriented to person, place, and time [Normal Mood] : the mood was normal [Normal Insight/Judgement] : insight and judgment were intact [de-identified] : chronic stasis/venous insufficiency [de-identified] : Crepitus in the left knee without effusion [de-identified] : Dry

## 2021-01-14 NOTE — REVIEW OF SYSTEMS
[Recent Change In Weight] : ~T recent weight change [Joint Pain] : joint pain [Back Pain] : back pain [Insomnia] : insomnia [Anxiety] : anxiety [Negative] : Heme/Lymph [Suicidal] : not suicidal [Depression] : no depression [FreeTextEntry9] : Knee pain

## 2021-01-14 NOTE — HISTORY OF PRESENT ILLNESS
[FreeTextEntry1] : 60  presents for a comprehensive and preventive exam - who complains of anxiety and left knee pain. [de-identified] : 59 yo woman  s/p weight loss surgery complicated a peptic ulcer who is still obese  presents for a comprehensive exam complaining of generalized anxiety.  In addition, she has left knee pain and has been started on Meloxicam by Dr. Gonzalez.  She notes that she is drinking alcohol sometimes as much as a pint/day and agrees that it is excessive.\par She is concerned about the environmental stressors, her family, as well as the realities of life such as the civil unrest and the pandemic.

## 2021-01-14 NOTE — HEALTH RISK ASSESSMENT
[Fair] : ~his/her~ current health as fair  [Good] : ~his/her~  mood as  good [Intercurrent ED visits] : went to ED [Yes] : Yes [No falls in past year] : Patient reported no falls in the past year [HIV test declined] : HIV test declined [None] : None [With Family] : lives with family [# of Members in Household ___] :  household currently consist of [unfilled] member(s) [High School] : high school [Sexually Active] : sexually active [] :  [Feels Safe at Home] : Feels safe at home [Fully functional (bathing, dressing, toileting, transferring, walking, feeding)] : Fully functional (bathing, dressing, toileting, transferring, walking, feeding) [Fully functional (using the telephone, shopping, preparing meals, housekeeping, doing laundry, using] : Fully functional and needs no help or supervision to perform IADLs (using the telephone, shopping, preparing meals, housekeeping, doing laundry, using transportation, managing medications and managing finances) [Reports normal functional visual acuity (ie: able to read med bottle)] : Reports normal functional visual acuity [Reports changes in dental health] : Reports changes in dental health [Smoke Detector] : smoke detector [Carbon Monoxide Detector] : carbon monoxide detector [Safety elements used in home] : safety elements used in home [Seat Belt] :  uses seat belt [Sunscreen] : uses sunscreen [] : No [de-identified] : Brian for exercise [de-identified] : Lunch and Dinner substantial meals; no snacks [Language] : denies difficulty with language [Behavior] : denies difficulty with behavior [Learning/Retaining New Information] : denies difficulty learning/retaining new information [Handling Complex Tasks] : denies difficulty handling complex tasks [Reasoning] : denies difficulty with reasoning [Spatial Ability and Orientation] : denies difficulty with spatial ability and orientation [High Risk Behavior] : no high risk behavior [Reports changes in hearing] : Reports no changes in hearing [Reports changes in vision] : Reports no changes in vision [MammogramDate] : 2/10 [ColonoscopyDate] : 6/2015 [de-identified] : Granchildren and son as well  [FreeTextEntry2] : Working 19 hours/day; off every other weekend

## 2021-02-02 LAB
AMPHET UR-MCNC: NEGATIVE
BARBITURATES UR-MCNC: NEGATIVE
BENZODIAZ UR-MCNC: NEGATIVE
COCAINE METAB.OTHER UR-MCNC: NEGATIVE
CREATININE, URINE: 122.9 MG/DL
METHADONE UR-MCNC: NEGATIVE
METHAQUALONE UR-MCNC: NEGATIVE
OPIATES UR-MCNC: NEGATIVE
PCP UR-MCNC: NEGATIVE
PROPOXYPH UR QL: NEGATIVE
THC UR QL: NEGATIVE

## 2021-02-10 ENCOUNTER — APPOINTMENT (OUTPATIENT)
Dept: ULTRASOUND IMAGING | Facility: IMAGING CENTER | Age: 61
End: 2021-02-10
Payer: COMMERCIAL

## 2021-02-10 ENCOUNTER — APPOINTMENT (OUTPATIENT)
Dept: MAMMOGRAPHY | Facility: IMAGING CENTER | Age: 61
End: 2021-02-10
Payer: COMMERCIAL

## 2021-02-10 ENCOUNTER — RESULT REVIEW (OUTPATIENT)
Age: 61
End: 2021-02-10

## 2021-02-10 ENCOUNTER — OUTPATIENT (OUTPATIENT)
Dept: OUTPATIENT SERVICES | Facility: HOSPITAL | Age: 61
LOS: 1 days | End: 2021-02-10
Payer: COMMERCIAL

## 2021-02-10 DIAGNOSIS — N60.19 DIFFUSE CYSTIC MASTOPATHY OF UNSPECIFIED BREAST: ICD-10-CM

## 2021-02-10 DIAGNOSIS — Z00.8 ENCOUNTER FOR OTHER GENERAL EXAMINATION: ICD-10-CM

## 2021-02-10 DIAGNOSIS — Z00.00 ENCOUNTER FOR GENERAL ADULT MEDICAL EXAMINATION WITHOUT ABNORMAL FINDINGS: ICD-10-CM

## 2021-02-10 DIAGNOSIS — R92.2 INCONCLUSIVE MAMMOGRAM: ICD-10-CM

## 2021-02-10 DIAGNOSIS — Z98.84 BARIATRIC SURGERY STATUS: Chronic | ICD-10-CM

## 2021-02-10 PROCEDURE — 77067 SCR MAMMO BI INCL CAD: CPT | Mod: 26

## 2021-02-10 PROCEDURE — 76641 ULTRASOUND BREAST COMPLETE: CPT | Mod: 26,50

## 2021-02-10 PROCEDURE — 77063 BREAST TOMOSYNTHESIS BI: CPT | Mod: 26

## 2021-02-10 PROCEDURE — 77063 BREAST TOMOSYNTHESIS BI: CPT

## 2021-02-10 PROCEDURE — 76641 ULTRASOUND BREAST COMPLETE: CPT

## 2021-02-10 PROCEDURE — 77067 SCR MAMMO BI INCL CAD: CPT

## 2021-02-18 ENCOUNTER — APPOINTMENT (OUTPATIENT)
Dept: INTERNAL MEDICINE | Facility: CLINIC | Age: 61
End: 2021-02-18

## 2021-02-21 NOTE — PHYSICAL EXAM
[Appropriately responsive] : appropriately responsive [No Acute Distress] : no acute distress [Alert] : alert [No Lymphadenopathy] : no lymphadenopathy [Soft] : soft [Non-tender] : non-tender [Non-distended] : non-distended [No HSM] : No HSM [No Lesions] : no lesions [No Mass] : no mass [Oriented x3] : oriented x3 [Examination Of The Breasts] : a normal appearance [No Masses] : no breast masses were palpable [Labia Majora] : normal [Labia Minora] : normal [Uterine Adnexae] : normal [Normal] : normal

## 2021-02-22 ENCOUNTER — RX CHANGE (OUTPATIENT)
Age: 61
End: 2021-02-22

## 2021-02-22 ENCOUNTER — RX RENEWAL (OUTPATIENT)
Age: 61
End: 2021-02-22

## 2021-02-24 ENCOUNTER — APPOINTMENT (OUTPATIENT)
Dept: INTERNAL MEDICINE | Facility: CLINIC | Age: 61
End: 2021-02-24

## 2021-02-25 ENCOUNTER — APPOINTMENT (OUTPATIENT)
Dept: OBGYN | Facility: CLINIC | Age: 61
End: 2021-02-25
Payer: COMMERCIAL

## 2021-02-25 VITALS
SYSTOLIC BLOOD PRESSURE: 130 MMHG | OXYGEN SATURATION: 100 % | BODY MASS INDEX: 35.47 KG/M2 | WEIGHT: 226 LBS | HEIGHT: 67 IN | HEART RATE: 98 BPM | TEMPERATURE: 97 F | DIASTOLIC BLOOD PRESSURE: 80 MMHG

## 2021-02-25 PROCEDURE — 99072 ADDL SUPL MATRL&STAF TM PHE: CPT

## 2021-02-25 PROCEDURE — 99396 PREV VISIT EST AGE 40-64: CPT

## 2021-02-27 LAB — HPV HIGH+LOW RISK DNA PNL CVX: NOT DETECTED

## 2021-03-01 LAB — CYTOLOGY CVX/VAG DOC THIN PREP: NORMAL

## 2021-03-17 ENCOUNTER — APPOINTMENT (OUTPATIENT)
Dept: INTERNAL MEDICINE | Facility: CLINIC | Age: 61
End: 2021-03-17
Payer: COMMERCIAL

## 2021-03-17 VITALS
HEIGHT: 67 IN | BODY MASS INDEX: 36.1 KG/M2 | RESPIRATION RATE: 16 BRPM | WEIGHT: 230 LBS | HEART RATE: 74 BPM | DIASTOLIC BLOOD PRESSURE: 84 MMHG | TEMPERATURE: 98.1 F | SYSTOLIC BLOOD PRESSURE: 150 MMHG

## 2021-03-17 PROCEDURE — 99072 ADDL SUPL MATRL&STAF TM PHE: CPT

## 2021-03-17 PROCEDURE — 99214 OFFICE O/P EST MOD 30 MIN: CPT

## 2021-03-17 NOTE — HISTORY OF PRESENT ILLNESS
[FreeTextEntry1] : 61 yo woman presents for a f/u for her BP and anxiety. [de-identified] : 59 yo woman with GERD, obestiy s/p Gastric Surgery, hypertension, and anxiety who is feeling well.  She thinks her BP is good as she has no symptoms.  Her anxiety is better with the Buspirone although she does get some vague symptoms for about 30 minutes after taking it.  She thinks it is working for her.

## 2021-04-26 ENCOUNTER — APPOINTMENT (OUTPATIENT)
Dept: OBGYN | Facility: CLINIC | Age: 61
End: 2021-04-26

## 2021-04-28 LAB — BACTERIA UR CULT: NORMAL

## 2021-07-15 ENCOUNTER — APPOINTMENT (OUTPATIENT)
Dept: INTERNAL MEDICINE | Facility: CLINIC | Age: 61
End: 2021-07-15

## 2021-07-20 ENCOUNTER — RX RENEWAL (OUTPATIENT)
Age: 61
End: 2021-07-20

## 2021-07-27 ENCOUNTER — APPOINTMENT (OUTPATIENT)
Dept: INTERNAL MEDICINE | Facility: CLINIC | Age: 61
End: 2021-07-27
Payer: COMMERCIAL

## 2021-07-27 PROCEDURE — 36415 COLL VENOUS BLD VENIPUNCTURE: CPT

## 2021-07-27 PROCEDURE — 99072 ADDL SUPL MATRL&STAF TM PHE: CPT

## 2021-07-27 PROCEDURE — 99214 OFFICE O/P EST MOD 30 MIN: CPT | Mod: 25

## 2021-07-27 RX ORDER — BUSPIRONE HYDROCHLORIDE 5 MG/1
5 TABLET ORAL
Qty: 90 | Refills: 3 | Status: DISCONTINUED | COMMUNITY
Start: 2021-07-20 | End: 2021-07-27

## 2021-07-27 NOTE — HISTORY OF PRESENT ILLNESS
[FreeTextEntry1] : 59 yo woman with hypertension and obesity s/p Bariatric Surgery presents for f/u.  She is feeling well but noted some ankle swelling [de-identified] : 59 yo woman with obesity and hypertension s/p Bariatric Surgery presents for an interval visit.  She notes some ankle swelling which has resolved after a trip to [place] recently.  She denies pain ans swelling was bilateral.\par She otherwise feels well a

## 2021-07-27 NOTE — PHYSICAL EXAM
[No Acute Distress] : no acute distress [Well Developed] : well developed [Normal Sclera/Conjunctiva] : normal sclera/conjunctiva [No JVD] : no jugular venous distention [No Respiratory Distress] : no respiratory distress  [Clear to Auscultation] : lungs were clear to auscultation bilaterally [Normal Percussion] : the chest was normal to percussion [Normal Rate] : normal rate  [Regular Rhythm] : with a regular rhythm [Normal S1, S2] : normal S1 and S2 [No Edema] : there was no peripheral edema [Soft] : abdomen soft [Non Tender] : non-tender [No CVA Tenderness] : no CVA  tenderness [No Spinal Tenderness] : no spinal tenderness [Normal Mood] : the mood was normal [de-identified] : Still periods of Anxiety with good relief with Buspirone

## 2021-07-28 LAB
ANION GAP SERPL CALC-SCNC: 11 MMOL/L
BUN SERPL-MCNC: 25 MG/DL
CALCIUM SERPL-MCNC: 9.5 MG/DL
CHLORIDE SERPL-SCNC: 105 MMOL/L
CO2 SERPL-SCNC: 26 MMOL/L
CREAT SERPL-MCNC: 1.12 MG/DL
GLUCOSE SERPL-MCNC: 113 MG/DL
POTASSIUM SERPL-SCNC: 4.5 MMOL/L
SODIUM SERPL-SCNC: 142 MMOL/L

## 2021-10-05 ENCOUNTER — APPOINTMENT (OUTPATIENT)
Dept: INTERNAL MEDICINE | Facility: CLINIC | Age: 61
End: 2021-10-05
Payer: COMMERCIAL

## 2021-10-05 PROCEDURE — 90471 IMMUNIZATION ADMIN: CPT

## 2021-10-05 PROCEDURE — 36415 COLL VENOUS BLD VENIPUNCTURE: CPT

## 2021-10-05 PROCEDURE — 90656 IIV3 VACC NO PRSV 0.5 ML IM: CPT

## 2021-10-05 PROCEDURE — 99214 OFFICE O/P EST MOD 30 MIN: CPT | Mod: 25

## 2021-10-06 LAB
ABO + RH PNL BLD: NORMAL
ANION GAP SERPL CALC-SCNC: 10 MMOL/L
BUN SERPL-MCNC: 20 MG/DL
CALCIUM SERPL-MCNC: 9.2 MG/DL
CHLORIDE SERPL-SCNC: 104 MMOL/L
CO2 SERPL-SCNC: 25 MMOL/L
CREAT SERPL-MCNC: 0.91 MG/DL
GLUCOSE SERPL-MCNC: 109 MG/DL
POTASSIUM SERPL-SCNC: 4.8 MMOL/L
SODIUM SERPL-SCNC: 139 MMOL/L
TSH SERPL-ACNC: 0.79 UIU/ML

## 2021-10-29 ENCOUNTER — RX CHANGE (OUTPATIENT)
Age: 61
End: 2021-10-29

## 2021-11-17 ENCOUNTER — APPOINTMENT (OUTPATIENT)
Dept: INTERNAL MEDICINE | Facility: CLINIC | Age: 61
End: 2021-11-17
Payer: COMMERCIAL

## 2021-11-17 PROCEDURE — 99214 OFFICE O/P EST MOD 30 MIN: CPT

## 2021-11-17 NOTE — HISTORY OF PRESENT ILLNESS
[FreeTextEntry1] : 61 yo woman presents for a f/u on her BP and response to sleeping medication.  She states that both are better. [de-identified] : 59 yo woman with obesity, hypertension, insomnia partially related to BRAULIO presenting for a check on medication changes.  She is sleeping well and is sticking to her anti hypertensive regime.\par She has picked up a few pounds but will get back to her diet.  She has no stomach pain.

## 2021-11-17 NOTE — PHYSICAL EXAM
[No Acute Distress] : no acute distress [Well Nourished] : well nourished [Well-Appearing] : well-appearing [Normal Sclera/Conjunctiva] : normal sclera/conjunctiva [No JVD] : no jugular venous distention [Clear to Auscultation] : lungs were clear to auscultation bilaterally [Normal Rate] : normal rate  [Regular Rhythm] : with a regular rhythm [No Edema] : there was no peripheral edema [Soft] : abdomen soft [Non Tender] : non-tender [No CVA Tenderness] : no CVA  tenderness [No Spinal Tenderness] : no spinal tenderness

## 2021-11-17 NOTE — HISTORY OF PRESENT ILLNESS
[FreeTextEntry8] : 61 yo woman presents after noting that she was dyspneic on exertion after having a mild case of Covid in August.  Her dyspnea has gone away but she now complains of persistent anxiety and insomnia since.  She has been losing weight and has increased her Buspirone without effect.\par She denies chest pain but has been somewhat non-adherent to using her diuretic.

## 2021-11-18 ENCOUNTER — RX RENEWAL (OUTPATIENT)
Age: 61
End: 2021-11-18

## 2022-01-10 ENCOUNTER — RX RENEWAL (OUTPATIENT)
Age: 62
End: 2022-01-10

## 2022-01-10 ENCOUNTER — APPOINTMENT (OUTPATIENT)
Dept: INTERNAL MEDICINE | Facility: CLINIC | Age: 62
End: 2022-01-10
Payer: COMMERCIAL

## 2022-01-10 PROCEDURE — 36415 COLL VENOUS BLD VENIPUNCTURE: CPT

## 2022-01-11 LAB
25(OH)D3 SERPL-MCNC: 43.6 NG/ML
ALBUMIN SERPL ELPH-MCNC: 4.2 G/DL
ALP BLD-CCNC: 76 U/L
ALT SERPL-CCNC: 22 U/L
ANION GAP SERPL CALC-SCNC: 15 MMOL/L
APPEARANCE: CLEAR
AST SERPL-CCNC: 21 U/L
BASOPHILS # BLD AUTO: 0.02 K/UL
BASOPHILS NFR BLD AUTO: 0.4 %
BILIRUB SERPL-MCNC: 0.5 MG/DL
BILIRUBIN URINE: NEGATIVE
BLOOD URINE: NEGATIVE
BUN SERPL-MCNC: 19 MG/DL
CALCIUM SERPL-MCNC: 9.4 MG/DL
CHLORIDE SERPL-SCNC: 103 MMOL/L
CO2 SERPL-SCNC: 24 MMOL/L
COLOR: YELLOW
CREAT SERPL-MCNC: 0.97 MG/DL
EOSINOPHIL # BLD AUTO: 0.18 K/UL
EOSINOPHIL NFR BLD AUTO: 3.8 %
GLUCOSE QUALITATIVE U: NEGATIVE
GLUCOSE SERPL-MCNC: 94 MG/DL
HCT VFR BLD CALC: 46.1 %
HGB BLD-MCNC: 14.1 G/DL
IMM GRANULOCYTES NFR BLD AUTO: 0.4 %
KETONES URINE: NEGATIVE
LEUKOCYTE ESTERASE URINE: NEGATIVE
LYMPHOCYTES # BLD AUTO: 1.61 K/UL
LYMPHOCYTES NFR BLD AUTO: 33.8 %
MAN DIFF?: NORMAL
MCHC RBC-ENTMCNC: 29 PG
MCHC RBC-ENTMCNC: 30.6 GM/DL
MCV RBC AUTO: 94.9 FL
MONOCYTES # BLD AUTO: 0.49 K/UL
MONOCYTES NFR BLD AUTO: 10.3 %
NEUTROPHILS # BLD AUTO: 2.44 K/UL
NEUTROPHILS NFR BLD AUTO: 51.3 %
NITRITE URINE: NEGATIVE
PH URINE: 6
PLATELET # BLD AUTO: 254 K/UL
POTASSIUM SERPL-SCNC: 4.3 MMOL/L
PROT SERPL-MCNC: 6.9 G/DL
PROTEIN URINE: NEGATIVE
RBC # BLD: 4.86 M/UL
RBC # FLD: 13.1 %
SODIUM SERPL-SCNC: 142 MMOL/L
SPECIFIC GRAVITY URINE: 1.03
T4 SERPL-MCNC: 5.3 UG/DL
TSH SERPL-ACNC: 0.85 UIU/ML
UROBILINOGEN URINE: NORMAL
WBC # FLD AUTO: 4.76 K/UL

## 2022-01-21 ENCOUNTER — NON-APPOINTMENT (OUTPATIENT)
Age: 62
End: 2022-01-21

## 2022-01-21 ENCOUNTER — APPOINTMENT (OUTPATIENT)
Dept: INTERNAL MEDICINE | Facility: CLINIC | Age: 62
End: 2022-01-21
Payer: COMMERCIAL

## 2022-01-21 VITALS
HEIGHT: 67 IN | SYSTOLIC BLOOD PRESSURE: 120 MMHG | DIASTOLIC BLOOD PRESSURE: 78 MMHG | WEIGHT: 250 LBS | BODY MASS INDEX: 39.24 KG/M2

## 2022-01-21 DIAGNOSIS — Z87.891 PERSONAL HISTORY OF NICOTINE DEPENDENCE: ICD-10-CM

## 2022-01-21 DIAGNOSIS — L03.031 CELLULITIS OF RIGHT TOE: ICD-10-CM

## 2022-01-21 LAB
CHOLEST SERPL-MCNC: 281 MG/DL
HDLC SERPL-MCNC: 101 MG/DL
LDLC SERPL CALC-MCNC: 162 MG/DL
NONHDLC SERPL-MCNC: 180 MG/DL
TRIGL SERPL-MCNC: 88 MG/DL

## 2022-01-21 PROCEDURE — 93000 ELECTROCARDIOGRAM COMPLETE: CPT | Mod: 59

## 2022-01-21 PROCEDURE — 99396 PREV VISIT EST AGE 40-64: CPT | Mod: 25

## 2022-01-21 RX ORDER — FLUCONAZOLE 150 MG/1
150 TABLET ORAL
Qty: 2 | Refills: 0 | Status: DISCONTINUED | COMMUNITY
Start: 2021-04-28 | End: 2022-01-21

## 2022-01-24 ENCOUNTER — NON-APPOINTMENT (OUTPATIENT)
Age: 62
End: 2022-01-24

## 2022-02-03 ENCOUNTER — APPOINTMENT (OUTPATIENT)
Dept: BARIATRICS/WEIGHT MGMT | Facility: CLINIC | Age: 62
End: 2022-02-03
Payer: COMMERCIAL

## 2022-02-03 VITALS — HEIGHT: 68.5 IN | BODY MASS INDEX: 37.46 KG/M2 | WEIGHT: 250 LBS

## 2022-02-03 DIAGNOSIS — Z13.820 ENCOUNTER FOR SCREENING FOR OSTEOPOROSIS: ICD-10-CM

## 2022-02-03 DIAGNOSIS — Z92.29 PERSONAL HISTORY OF OTHER DRUG THERAPY: ICD-10-CM

## 2022-02-03 DIAGNOSIS — Z87.898 PERSONAL HISTORY OF OTHER SPECIFIED CONDITIONS: ICD-10-CM

## 2022-02-03 PROCEDURE — 99205 OFFICE O/P NEW HI 60 MIN: CPT | Mod: 95

## 2022-02-03 NOTE — HISTORY OF PRESENT ILLNESS
[FreeTextEntry1] : Ms. MELODIE ROBLEDO is a 61 year year old female who presents for evaluation and treatment of Class 2 obesity. \par \par Obesity related co-morbidities: HTN, GERD - with ppi, BRAULIO was on CPAP but not now, anxiety, knee OA\par +gastric sleeve- 2014 - [facility], +gastric bypass - 2019 - [facility], tummy tuck - 2015?\par \par Patient lives -  - "sweet eater", grand kids - 3 - 2y.o, 16, 19 -> has had >10 years.  6 kids and 15 grandchildren. . 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  At bypass- 220.\par \par Has gained 20-30 pounds over the past year.\par \par Obesity began in young adulthood, pregnancy.  Weight gain has occurred with: Incr alcohol. \par \par Increased alcohol use since covid.  2-3 shot of stefania on the rocks, 3-4 days a week. \par \par Past weight loss attempts include:  WW, sleeve, bypass. These have produced a maximum of 50-60 pounds of weight loss.  \par Anti-obesity medications in the past: pills - GoLy pills.  \par \par Reasons for desiring weight loss: knees \par Perceived obstacles to losing weight: her schedule, now much better. \par \par Sleep: 3-4 hours\par \par Has 3 regular meals a day. \par \par Diet history:\par wakes up at:\par B: 7-8am egg omelette w veggies.  or oatmeal w banana. \par L: 12pm salad w chicken \par snack - chips, sweet, piece of cheese, crackers.\par D: 6pm - 1/2 baked potato, piece of fish and vegetables. \par 830-9pm - baked chicken. \par overnight - sometimes a sandwich - 11pm\par \par snacks: yes- sweets\par eating after dinner: sometimes. \par overeating episodes: sometimes. \par \par Sodas/fast food/processed foods: +fried\par \par Water intake per day: 3 bottle per day. keeps case of water in car.\par \par Physical activity:\par Patient enjoys: walking\par Current physical activity: 3 miles walking x 5 times a week - at the mall w her patient. \par \par Habits patient would like to change: less fried foods, less sweets\par Level of interest in losing weight: 5/5\par Community support: 3/5 family 5/5 friends\par \par Factors that have helped in the past with losing weight and keeping it off: decr portions\par \par

## 2022-02-03 NOTE — REASON FOR VISIT
[Home] : at home, [unfilled] , at the time of the visit. [Medical Office: ([facility])___] : at the medical office located in

## 2022-02-03 NOTE — ASSESSMENT
[FreeTextEntry1] : Bariatric surgery history:  sleeve-> bypass 2019\par Obesity co-morbidities: HTN, BRAULIO, GERD\par Comorbidities improved or resolved: none\par Anti-obesity medications: none\par Obesity medication side effects: na\par \par 61 y.o female w Class 2 obesity, gerd, htn, braulio, presents for weight management\par \par - discussed kitchen closed after dinner\par - will send post op handouts\par - can discuss medications - GLP-1, topiramate, phentermine depending on problem areas - has restriction w bypass but snacking out of habit\par - given RDN appt #\par \par - Discussed several lifestyle goals and ways to monitor progress\par - Discussed the importance of 3 consistent protein focused meals throughout the day\par - no seltzer or other carbonated drinks.  Water only. \par - Discussed rationale for whole food based diet, discussed strategies to limit foods that cause spikes in glucose, insulin, and appetite.\par -advised avoidance of snack products and packaged / processed foods\par - Provided sample meal/snack ideas - resources from Rachel PANTOJA for post-op bariatric nutrition and counseled on optimal meal pattern and timing, food choice, and nutrient ratio.\par - If [facility] patient, recommended post-op bariatric support group follow up\par - Reviewed protein and fluid goals.  \par - Reviewed vitamin and mineral supplementation recommendations.\par - Discussed strategies to maximize nourishment within tolerance level.\par - Reviewed strategies to identify and honor hunger and satiety cues and re-direct emotional eating.\par - Discussed beneficial behavioral strategies to maximize weight loss, nutritional status, and tolerance. \par - Reviewed exercise goals - post op goals of 250- 320 minutes per day of cardio activity as tolerated for avoidance of weight regain\par \par f/u 2-3 weeks\par \par \par

## 2022-02-17 ENCOUNTER — APPOINTMENT (OUTPATIENT)
Dept: BARIATRICS/WEIGHT MGMT | Facility: CLINIC | Age: 62
End: 2022-02-17

## 2022-02-17 DIAGNOSIS — G47.33 OBSTRUCTIVE SLEEP APNEA (ADULT) (PEDIATRIC): ICD-10-CM

## 2022-02-17 DIAGNOSIS — K21.00 GASTRO-ESOPHAGEAL REFLUX DISEASE WITH ESOPHAGITIS, WITHOUT BLEEDING: ICD-10-CM

## 2022-02-17 NOTE — REASON FOR VISIT
[Home] : at home, [unfilled] , at the time of the visit. [Medical Office: ([facility])___] : at the medical office located in

## 2022-02-17 NOTE — HISTORY OF PRESENT ILLNESS
[FreeTextEntry1] : Ms. MELODIE ROBLEDO is a 61 year year old female who presents for evaluation and treatment of Class 2 obesity. \par \par Obesity related co-morbidities: HTN, GERD - with ppi, BRAULIO was on CPAP but not now, anxiety, knee OA\par +gastric sleeve- 2014 - [facility], +gastric [place] - 2019 - [facility], tummy tuck - 2015?\par \par Patient lives -  - "sweet eater", grand kids - 3 - 2y.o, 16, 19 -> has had >10 years.  6 kids and 15 grandchildren. . 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  At bypass- 220.\par \par Has gained 20-30 pounds over the past year.\par \par Obesity began in young adulthood, pregnancy.  Weight gain has occurred with: Incr alcohol. \par \par Increased alcohol use since covid.  2-3 shot of stefania on the rocks, 3-4 days a week. \par \par Past weight loss attempts include:  WW, sleeve, bypass. These have produced a maximum of 50-60 pounds of weight loss.  \par Anti-obesity medications in the past: pills - GoLy pills.  \par \par Reasons for desiring weight loss: knees \par Perceived obstacles to losing weight: her schedule, now much better. \par \par Sleep: 3-4 hours\par \par Has 3 regular meals a day. \par \par Diet history:\par wakes up at:\par B: 7-8am egg omelette w veggies.  or oatmeal w banana. \par L: 12pm salad w chicken \par snack - chips, sweet, piece of cheese, crackers.\par D: 6pm - 1/2 baked potato, piece of fish and vegetables. \par 830-9pm - baked chicken. \par overnight - sometimes a sandwich - 11pm\par \par snacks: yes- sweets\par eating after dinner: sometimes. \par overeating episodes: sometimes. \par \par Sodas/fast food/processed foods: +fried\par \par Water intake per day: 3 bottle per day. keeps case of water in car.\par \par Physical activity:\par Patient enjoys: walking\par Current physical activity: 3 miles walking x 5 times a week - at the mall w her patient. \par \par Habits patient would like to change: less fried foods, less sweets\par Level of interest in losing weight: 5/5\par Community support: 3/5 family 5/5 friends\par \par Factors that have helped in the past with losing weight and keeping it off: decr portions\par \par

## 2022-02-25 PROBLEM — Z87.898 HISTORY OF ABDOMINAL PAIN: Status: RESOLVED | Noted: 2020-01-16 | Resolved: 2022-02-25

## 2022-02-25 PROBLEM — Z87.42 HISTORY OF VAGINAL PRURITUS: Status: RESOLVED | Noted: 2021-04-28 | Resolved: 2022-02-25

## 2022-03-02 ENCOUNTER — APPOINTMENT (OUTPATIENT)
Dept: OBGYN | Facility: CLINIC | Age: 62
End: 2022-03-02
Payer: COMMERCIAL

## 2022-03-02 ENCOUNTER — NON-APPOINTMENT (OUTPATIENT)
Age: 62
End: 2022-03-02

## 2022-03-02 VITALS
SYSTOLIC BLOOD PRESSURE: 111 MMHG | BODY MASS INDEX: 35.27 KG/M2 | DIASTOLIC BLOOD PRESSURE: 73 MMHG | HEART RATE: 106 BPM | HEIGHT: 68.5 IN | WEIGHT: 235.4 LBS

## 2022-03-02 DIAGNOSIS — Z87.42 PERSONAL HISTORY OF OTHER DISEASES OF THE FEMALE GENITAL TRACT: ICD-10-CM

## 2022-03-02 DIAGNOSIS — Z87.898 PERSONAL HISTORY OF OTHER SPECIFIED CONDITIONS: ICD-10-CM

## 2022-03-02 DIAGNOSIS — Z12.39 ENCOUNTER FOR OTHER SCREENING FOR MALIGNANT NEOPLASM OF BREAST: ICD-10-CM

## 2022-03-02 PROCEDURE — 99396 PREV VISIT EST AGE 40-64: CPT

## 2022-03-07 ENCOUNTER — OUTPATIENT (OUTPATIENT)
Dept: OUTPATIENT SERVICES | Facility: HOSPITAL | Age: 62
LOS: 1 days | End: 2022-03-07
Payer: COMMERCIAL

## 2022-03-07 ENCOUNTER — RESULT REVIEW (OUTPATIENT)
Age: 62
End: 2022-03-07

## 2022-03-07 ENCOUNTER — APPOINTMENT (OUTPATIENT)
Dept: ULTRASOUND IMAGING | Facility: IMAGING CENTER | Age: 62
End: 2022-03-07
Payer: COMMERCIAL

## 2022-03-07 ENCOUNTER — APPOINTMENT (OUTPATIENT)
Dept: MAMMOGRAPHY | Facility: IMAGING CENTER | Age: 62
End: 2022-03-07
Payer: COMMERCIAL

## 2022-03-07 DIAGNOSIS — Z00.8 ENCOUNTER FOR OTHER GENERAL EXAMINATION: ICD-10-CM

## 2022-03-07 DIAGNOSIS — Z98.84 BARIATRIC SURGERY STATUS: Chronic | ICD-10-CM

## 2022-03-07 PROCEDURE — 77063 BREAST TOMOSYNTHESIS BI: CPT

## 2022-03-07 PROCEDURE — 77063 BREAST TOMOSYNTHESIS BI: CPT | Mod: 26

## 2022-03-07 PROCEDURE — 76641 ULTRASOUND BREAST COMPLETE: CPT

## 2022-03-07 PROCEDURE — 77067 SCR MAMMO BI INCL CAD: CPT | Mod: 26

## 2022-03-07 PROCEDURE — 77067 SCR MAMMO BI INCL CAD: CPT

## 2022-03-07 PROCEDURE — 76641 ULTRASOUND BREAST COMPLETE: CPT | Mod: 26,50

## 2022-03-17 ENCOUNTER — APPOINTMENT (OUTPATIENT)
Dept: GASTROENTEROLOGY | Facility: CLINIC | Age: 62
End: 2022-03-17
Payer: COMMERCIAL

## 2022-03-17 DIAGNOSIS — Z12.11 ENCOUNTER FOR SCREENING FOR MALIGNANT NEOPLASM OF COLON: ICD-10-CM

## 2022-03-17 PROCEDURE — 99442: CPT

## 2022-04-04 ENCOUNTER — NON-APPOINTMENT (OUTPATIENT)
Age: 62
End: 2022-04-04

## 2022-04-05 ENCOUNTER — RX RENEWAL (OUTPATIENT)
Age: 62
End: 2022-04-05

## 2022-04-05 ENCOUNTER — APPOINTMENT (OUTPATIENT)
Dept: INTERNAL MEDICINE | Facility: CLINIC | Age: 62
End: 2022-04-05
Payer: COMMERCIAL

## 2022-04-05 ENCOUNTER — NON-APPOINTMENT (OUTPATIENT)
Age: 62
End: 2022-04-05

## 2022-04-05 VITALS
DIASTOLIC BLOOD PRESSURE: 80 MMHG | HEIGHT: 68.5 IN | WEIGHT: 230 LBS | BODY MASS INDEX: 34.46 KG/M2 | SYSTOLIC BLOOD PRESSURE: 110 MMHG | OXYGEN SATURATION: 98 %

## 2022-04-05 PROCEDURE — 93000 ELECTROCARDIOGRAM COMPLETE: CPT | Mod: 59

## 2022-04-05 PROCEDURE — 36415 COLL VENOUS BLD VENIPUNCTURE: CPT

## 2022-04-05 PROCEDURE — 99214 OFFICE O/P EST MOD 30 MIN: CPT | Mod: 25

## 2022-04-06 ENCOUNTER — APPOINTMENT (OUTPATIENT)
Dept: CARDIOLOGY | Facility: CLINIC | Age: 62
End: 2022-04-06
Payer: COMMERCIAL

## 2022-04-06 ENCOUNTER — NON-APPOINTMENT (OUTPATIENT)
Age: 62
End: 2022-04-06

## 2022-04-06 VITALS — DIASTOLIC BLOOD PRESSURE: 74 MMHG | SYSTOLIC BLOOD PRESSURE: 114 MMHG

## 2022-04-06 VITALS
HEART RATE: 83 BPM | WEIGHT: 226 LBS | HEIGHT: 68.5 IN | DIASTOLIC BLOOD PRESSURE: 70 MMHG | SYSTOLIC BLOOD PRESSURE: 112 MMHG | OXYGEN SATURATION: 97 % | BODY MASS INDEX: 33.86 KG/M2

## 2022-04-06 DIAGNOSIS — R94.31 ABNORMAL ELECTROCARDIOGRAM [ECG] [EKG]: ICD-10-CM

## 2022-04-06 LAB
ALBUMIN SERPL ELPH-MCNC: 4.3 G/DL
ALP BLD-CCNC: 51 U/L
ALT SERPL-CCNC: 26 U/L
ANION GAP SERPL CALC-SCNC: 16 MMOL/L
APPEARANCE: CLEAR
AST SERPL-CCNC: 28 U/L
BASOPHILS # BLD AUTO: 0.02 K/UL
BASOPHILS NFR BLD AUTO: 0.3 %
BILIRUB SERPL-MCNC: 0.4 MG/DL
BILIRUBIN URINE: NEGATIVE
BLOOD URINE: NORMAL
BUN SERPL-MCNC: 25 MG/DL
CALCIUM SERPL-MCNC: 9.6 MG/DL
CHLORIDE SERPL-SCNC: 104 MMOL/L
CHOLEST SERPL-MCNC: 241 MG/DL
CK SERPL-CCNC: 123 U/L
CO2 SERPL-SCNC: 20 MMOL/L
COLOR: YELLOW
COVID-19 NUCLEOCAPSID  GAM ANTIBODY INTERPRETATION: POSITIVE
CREAT SERPL-MCNC: 0.97 MG/DL
EGFR: 67 ML/MIN/1.73M2
EOSINOPHIL # BLD AUTO: 0.1 K/UL
EOSINOPHIL NFR BLD AUTO: 1.7 %
FERRITIN SERPL-MCNC: 140 NG/ML
FOLATE SERPL-MCNC: 18.2 NG/ML
GLUCOSE QUALITATIVE U: NEGATIVE
GLUCOSE SERPL-MCNC: 103 MG/DL
HCT VFR BLD CALC: 44.9 %
HDLC SERPL-MCNC: 81 MG/DL
HGB BLD-MCNC: 13.7 G/DL
IMM GRANULOCYTES NFR BLD AUTO: 0.2 %
IRON SATN MFR SERPL: 33 %
IRON SERPL-MCNC: 117 UG/DL
KETONES URINE: NEGATIVE
LDLC SERPL CALC-MCNC: 147 MG/DL
LEUKOCYTE ESTERASE URINE: NEGATIVE
LYMPHOCYTES # BLD AUTO: 1.14 K/UL
LYMPHOCYTES NFR BLD AUTO: 19.2 %
MAGNESIUM SERPL-MCNC: 2.3 MG/DL
MAN DIFF?: NORMAL
MCHC RBC-ENTMCNC: 28.3 PG
MCHC RBC-ENTMCNC: 30.5 GM/DL
MCV RBC AUTO: 92.8 FL
MONOCYTES # BLD AUTO: 0.42 K/UL
MONOCYTES NFR BLD AUTO: 7.1 %
NEUTROPHILS # BLD AUTO: 4.25 K/UL
NEUTROPHILS NFR BLD AUTO: 71.5 %
NITRITE URINE: NEGATIVE
NONHDLC SERPL-MCNC: 160 MG/DL
PH URINE: 6
PLATELET # BLD AUTO: 249 K/UL
POTASSIUM SERPL-SCNC: 4.9 MMOL/L
PROT SERPL-MCNC: 7 G/DL
PROTEIN URINE: NEGATIVE
RBC # BLD: 4.84 M/UL
RBC # FLD: 13.4 %
SARS-COV-2 AB SERPL QL IA: 46.3 INDEX
SODIUM SERPL-SCNC: 140 MMOL/L
SPECIFIC GRAVITY URINE: 1.01
TIBC SERPL-MCNC: 351 UG/DL
TRIGL SERPL-MCNC: 66 MG/DL
TROPONIN I SERPL-MCNC: <0.01 NG/ML
TSH SERPL-ACNC: 0.8 UIU/ML
UIBC SERPL-MCNC: 234 UG/DL
UROBILINOGEN URINE: NORMAL
VIT B12 SERPL-MCNC: 1031 PG/ML
WBC # FLD AUTO: 5.94 K/UL

## 2022-04-06 PROCEDURE — 93000 ELECTROCARDIOGRAM COMPLETE: CPT

## 2022-04-06 PROCEDURE — 99244 OFF/OP CNSLTJ NEW/EST MOD 40: CPT

## 2022-04-06 NOTE — HISTORY OF PRESENT ILLNESS
[FreeTextEntry1] : Elizabet,\par Thank you for referring her for cardiovascular evaluation.  She is a 61-year-old with a history of hypertension, obesity and recently diagnosed abnormal ECG.  She reports being seen an urgent care facility for significant headache.  She was noted to have an abnormal ECG during that visit.  She denies any chest pain but does have shortness of breath with exertion that has been improving as her weight has been getting lower over the past few months.\par She has a history of smoking but has not smoked in many years.  She has no history of coronary artery disease, diabetes mellitus or family history of premature coronary artery disease.\par She does have a history of esophageal reflux.\par

## 2022-04-06 NOTE — DISCUSSION/SUMMARY
[FreeTextEntry1] : She is a 61-year-old with hypertension, hypercholesterolemia and an abnormal ECG.  I suggested that she undergo echocardiography in order to exclude any structural heart disease as well as an exercise stress test with nuclear imaging in order to exclude ischemic heart disease as a cause of her ECG abnormality.\par She will benefit from continued weight loss and routine aerobic activity.\par No changes to her medical regimen were made.\par

## 2022-04-16 ENCOUNTER — APPOINTMENT (OUTPATIENT)
Dept: INTERNAL MEDICINE | Facility: CLINIC | Age: 62
End: 2022-04-16

## 2022-04-17 ENCOUNTER — RX RENEWAL (OUTPATIENT)
Age: 62
End: 2022-04-17

## 2022-04-19 LAB — VIT B1 SERPL-MCNC: 118.3 NMOL/L

## 2022-05-04 ENCOUNTER — RX RENEWAL (OUTPATIENT)
Age: 62
End: 2022-05-04

## 2022-05-05 ENCOUNTER — APPOINTMENT (OUTPATIENT)
Dept: CARDIOLOGY | Facility: CLINIC | Age: 62
End: 2022-05-05
Payer: COMMERCIAL

## 2022-05-05 PROCEDURE — 93306 TTE W/DOPPLER COMPLETE: CPT

## 2022-05-05 PROCEDURE — A9500: CPT

## 2022-05-05 PROCEDURE — 78452 HT MUSCLE IMAGE SPECT MULT: CPT

## 2022-05-05 PROCEDURE — 93015 CV STRESS TEST SUPVJ I&R: CPT

## 2022-05-18 ENCOUNTER — RX RENEWAL (OUTPATIENT)
Age: 62
End: 2022-05-18

## 2022-05-25 ENCOUNTER — RX RENEWAL (OUTPATIENT)
Age: 62
End: 2022-05-25

## 2022-06-07 ENCOUNTER — APPOINTMENT (OUTPATIENT)
Dept: INTERNAL MEDICINE | Facility: CLINIC | Age: 62
End: 2022-06-07
Payer: COMMERCIAL

## 2022-06-07 VITALS
HEIGHT: 66 IN | BODY MASS INDEX: 36.16 KG/M2 | SYSTOLIC BLOOD PRESSURE: 120 MMHG | WEIGHT: 225 LBS | DIASTOLIC BLOOD PRESSURE: 70 MMHG

## 2022-06-07 PROCEDURE — 99213 OFFICE O/P EST LOW 20 MIN: CPT

## 2022-06-07 RX ORDER — SODIUM SULFATE, POTASSIUM SULFATE, MAGNESIUM SULFATE 17.5; 3.13; 1.6 G/ML; G/ML; G/ML
17.5-3.13-1.6 SOLUTION, CONCENTRATE ORAL
Qty: 1 | Refills: 0 | Status: DISCONTINUED | COMMUNITY
Start: 2017-02-13 | End: 2022-06-07

## 2022-06-10 NOTE — HISTORY OF PRESENT ILLNESS
[FreeTextEntry1] : \par f/u  [de-identified] : \par had gone to see neurology \par ha 2/2 covid-19 resolved

## 2022-06-10 NOTE — PHYSICAL EXAM
[No Acute Distress] : no acute distress [Well Nourished] : well nourished [No Accessory Muscle Use] : no accessory muscle use [Clear to Auscultation] : lungs were clear to auscultation bilaterally [Regular Rhythm] : with a regular rhythm [Normal S1, S2] : normal S1 and S2 [No Murmur] : no murmur heard [Normal Affect] : the affect was normal [Normal Insight/Judgement] : insight and judgment were intact

## 2022-07-05 ENCOUNTER — RX CHANGE (OUTPATIENT)
Age: 62
End: 2022-07-05

## 2022-07-05 ENCOUNTER — APPOINTMENT (OUTPATIENT)
Dept: GASTROENTEROLOGY | Facility: AMBULATORY MEDICAL SERVICES | Age: 62
End: 2022-07-05

## 2022-07-05 PROCEDURE — 45380 COLONOSCOPY AND BIOPSY: CPT | Mod: 33

## 2022-07-12 RX ORDER — BUSPIRONE HYDROCHLORIDE 10 MG/1
10 TABLET ORAL TWICE DAILY
Qty: 360 | Refills: 2 | Status: ACTIVE | COMMUNITY
Start: 2020-03-16 | End: 1900-01-01

## 2022-07-19 ENCOUNTER — NON-APPOINTMENT (OUTPATIENT)
Age: 62
End: 2022-07-19

## 2022-08-24 ENCOUNTER — NON-APPOINTMENT (OUTPATIENT)
Age: 62
End: 2022-08-24

## 2022-09-27 ENCOUNTER — APPOINTMENT (OUTPATIENT)
Dept: INTERNAL MEDICINE | Facility: CLINIC | Age: 62
End: 2022-09-27

## 2022-09-27 VITALS — DIASTOLIC BLOOD PRESSURE: 80 MMHG | SYSTOLIC BLOOD PRESSURE: 120 MMHG

## 2022-09-27 VITALS — BODY MASS INDEX: 38.09 KG/M2 | WEIGHT: 237 LBS | HEIGHT: 66 IN

## 2022-09-27 DIAGNOSIS — Z87.898 PERSONAL HISTORY OF OTHER SPECIFIED CONDITIONS: ICD-10-CM

## 2022-09-27 DIAGNOSIS — M75.101 UNSPECIFIED ROTATOR CUFF TEAR OR RUPTURE OF RIGHT SHOULDER, NOT SPECIFIED AS TRAUMATIC: ICD-10-CM

## 2022-09-27 DIAGNOSIS — Z87.11 PERSONAL HISTORY OF PEPTIC ULCER DISEASE: ICD-10-CM

## 2022-09-27 LAB
BASOPHILS # BLD AUTO: 0.01 K/UL
BASOPHILS NFR BLD AUTO: 0.2 %
EOSINOPHIL # BLD AUTO: 0.12 K/UL
EOSINOPHIL NFR BLD AUTO: 2.4 %
ESTIMATED AVERAGE GLUCOSE: 100 MG/DL
HBA1C MFR BLD HPLC: 5.1 %
HCT VFR BLD CALC: 43.4 %
HGB BLD-MCNC: 13.3 G/DL
IMM GRANULOCYTES NFR BLD AUTO: 0.2 %
LYMPHOCYTES # BLD AUTO: 1.39 K/UL
LYMPHOCYTES NFR BLD AUTO: 27.9 %
MAN DIFF?: NORMAL
MCHC RBC-ENTMCNC: 27.8 PG
MCHC RBC-ENTMCNC: 30.6 GM/DL
MCV RBC AUTO: 90.8 FL
MONOCYTES # BLD AUTO: 0.49 K/UL
MONOCYTES NFR BLD AUTO: 9.8 %
NEUTROPHILS # BLD AUTO: 2.96 K/UL
NEUTROPHILS NFR BLD AUTO: 59.5 %
PLATELET # BLD AUTO: 241 K/UL
RBC # BLD: 4.78 M/UL
RBC # FLD: 13.2 %
WBC # FLD AUTO: 4.98 K/UL

## 2022-09-27 PROCEDURE — 99213 OFFICE O/P EST LOW 20 MIN: CPT | Mod: 25

## 2022-09-27 PROCEDURE — 36415 COLL VENOUS BLD VENIPUNCTURE: CPT

## 2022-09-27 RX ORDER — TOPIRAMATE 25 MG/1
25 TABLET, FILM COATED ORAL
Qty: 90 | Refills: 0 | Status: DISCONTINUED | COMMUNITY
Start: 2022-05-04 | End: 2022-09-27

## 2022-09-28 LAB
ALBUMIN SERPL ELPH-MCNC: 4.3 G/DL
ALP BLD-CCNC: 71 U/L
ALT SERPL-CCNC: 20 U/L
ANION GAP SERPL CALC-SCNC: 13 MMOL/L
AST SERPL-CCNC: 24 U/L
BILIRUB SERPL-MCNC: 0.4 MG/DL
BUN SERPL-MCNC: 21 MG/DL
CALCIUM SERPL-MCNC: 9.5 MG/DL
CHLORIDE SERPL-SCNC: 105 MMOL/L
CO2 SERPL-SCNC: 25 MMOL/L
CREAT SERPL-MCNC: 0.94 MG/DL
EGFR: 69 ML/MIN/1.73M2
GLUCOSE SERPL-MCNC: 87 MG/DL
POTASSIUM SERPL-SCNC: 4.4 MMOL/L
PROT SERPL-MCNC: 6.7 G/DL
SODIUM SERPL-SCNC: 143 MMOL/L

## 2022-09-29 PROBLEM — Z87.11 HISTORY OF GASTRIC ULCER: Status: RESOLVED | Noted: 2020-10-12 | Resolved: 2022-09-29

## 2022-09-29 PROBLEM — Z87.898 HISTORY OF HEADACHE: Status: RESOLVED | Noted: 2022-04-06 | Resolved: 2022-09-29

## 2022-09-29 NOTE — HISTORY OF PRESENT ILLNESS
[FreeTextEntry1] : \par f/u  [de-identified] : HTN - on meds\par \par mood - good\par \par stopped topamax for obesity

## 2022-09-30 ENCOUNTER — RX RENEWAL (OUTPATIENT)
Age: 62
End: 2022-09-30

## 2022-10-05 ENCOUNTER — NON-APPOINTMENT (OUTPATIENT)
Age: 62
End: 2022-10-05

## 2022-10-05 LAB
CHOLEST SERPL-MCNC: 253 MG/DL
HDLC SERPL-MCNC: 114 MG/DL
LDLC SERPL CALC-MCNC: 126 MG/DL
NONHDLC SERPL-MCNC: 139 MG/DL
TRIGL SERPL-MCNC: 67 MG/DL

## 2022-12-05 ENCOUNTER — RX RENEWAL (OUTPATIENT)
Age: 62
End: 2022-12-05

## 2023-01-17 ENCOUNTER — APPOINTMENT (OUTPATIENT)
Dept: INTERNAL MEDICINE | Facility: CLINIC | Age: 63
End: 2023-01-17
Payer: COMMERCIAL

## 2023-01-17 ENCOUNTER — NON-APPOINTMENT (OUTPATIENT)
Age: 63
End: 2023-01-17

## 2023-01-17 VITALS
WEIGHT: 235 LBS | HEIGHT: 66 IN | BODY MASS INDEX: 37.77 KG/M2 | SYSTOLIC BLOOD PRESSURE: 120 MMHG | DIASTOLIC BLOOD PRESSURE: 70 MMHG

## 2023-01-17 DIAGNOSIS — Z23 ENCOUNTER FOR IMMUNIZATION: ICD-10-CM

## 2023-01-17 DIAGNOSIS — F41.9 ANXIETY DISORDER, UNSPECIFIED: ICD-10-CM

## 2023-01-17 PROCEDURE — 93000 ELECTROCARDIOGRAM COMPLETE: CPT | Mod: 59

## 2023-01-17 PROCEDURE — 36415 COLL VENOUS BLD VENIPUNCTURE: CPT

## 2023-01-17 PROCEDURE — G0444 DEPRESSION SCREEN ANNUAL: CPT | Mod: 59

## 2023-01-17 PROCEDURE — 99214 OFFICE O/P EST MOD 30 MIN: CPT | Mod: 25

## 2023-01-17 PROCEDURE — 90715 TDAP VACCINE 7 YRS/> IM: CPT

## 2023-01-17 PROCEDURE — 99396 PREV VISIT EST AGE 40-64: CPT | Mod: 25

## 2023-01-17 PROCEDURE — 90471 IMMUNIZATION ADMIN: CPT

## 2023-01-17 RX ORDER — MELATONIN 5 MG
5 CAPSULE ORAL AT BEDTIME
Refills: 0 | Status: ACTIVE | COMMUNITY
Start: 2023-01-17

## 2023-01-17 NOTE — HEALTH RISK ASSESSMENT
[Patient reported mammogram was normal] : Patient reported mammogram was normal [Patient reported colonoscopy was normal] : Patient reported colonoscopy was normal [HIV Test offered] : HIV Test offered [MammogramDate] : 3/22 [ColonoscopyDate] : 7/22

## 2023-01-17 NOTE — PHYSICAL EXAM
[No Acute Distress] : no acute distress [Well Nourished] : well nourished [PERRL] : pupils equal round and reactive to light [EOMI] : extraocular movements intact [Normal Oropharynx] : the oropharynx was normal [Normal TMs] : both tympanic membranes were normal [Normal Nasal Mucosa] : the nasal mucosa was normal [No Lymphadenopathy] : no lymphadenopathy [Supple] : supple [Thyroid Normal, No Nodules] : the thyroid was normal and there were no nodules present [No Accessory Muscle Use] : no accessory muscle use [Clear to Auscultation] : lungs were clear to auscultation bilaterally [Regular Rhythm] : with a regular rhythm [Normal S1, S2] : normal S1 and S2 [No Murmur] : no murmur heard [No Edema] : there was no peripheral edema [Normal Appearance] : normal in appearance [No Nipple Discharge] : no nipple discharge [No Axillary Lymphadenopathy] : no axillary lymphadenopathy [Soft] : abdomen soft [Non Tender] : non-tender [Non-distended] : non-distended [No Masses] : no abdominal mass palpated [No HSM] : no HSM [Normal Bowel Sounds] : normal bowel sounds [Normal Supraclavicular Nodes] : no supraclavicular lymphadenopathy [Normal Axillary Nodes] : no axillary lymphadenopathy [Normal Posterior Cervical Nodes] : no posterior cervical lymphadenopathy [Normal Anterior Cervical Nodes] : no anterior cervical lymphadenopathy [Normal] : the cranial nerves were intact [Sensation Tactile Decrease] : light touch was intact [2+] : left 2+ [Normal Affect] : the affect was normal [Normal Insight/Judgement] : insight and judgment were intact [de-identified] : normal color and pigmentation of nevi

## 2023-01-17 NOTE — HISTORY OF PRESENT ILLNESS
[FreeTextEntry1] : \par CPE  [de-identified] : Diet: Weight Watchers\par Exercise: walking - hard to do in the cold \par \par obesity - s/p gastric bypass ~2019 - weight pre gastric bypass 300 lbs, now 235 lbs, BMI 37.9\par had been on topamax x 1 month which she self d/c'd \par open to start medication to aid w/weight loss \par \par

## 2023-01-17 NOTE — ASSESSMENT
[FreeTextEntry1] : \par 62F c HTN, GERD, obesity s/p gastric bypass, PRINCESS, BRAULIO, history of covid-19 infeciton here for cpe\par \par GHM -check fasting BW \par ekg NSR - no acute changes from 1/16/20\par states utd with optho & dental exam\par states UTD with flu & bivalent covid boosetr\par UTD with screening mammogram \par advised f/u with pulm for BRAULIO - not using bipap \par UTD with colonoscopy 7/22 - \par has appt to see gyn for routine exam 3/23 \par \par rtc in 2-4 weeks as above \par \par \par \par

## 2023-01-18 LAB
ALBUMIN SERPL ELPH-MCNC: 4.2 G/DL
ALP BLD-CCNC: 70 U/L
ALT SERPL-CCNC: 35 U/L
ANION GAP SERPL CALC-SCNC: 11 MMOL/L
AST SERPL-CCNC: 29 U/L
BASOPHILS # BLD AUTO: 0.02 K/UL
BASOPHILS NFR BLD AUTO: 0.5 %
BILIRUB SERPL-MCNC: 0.7 MG/DL
BUN SERPL-MCNC: 17 MG/DL
CALCIUM SERPL-MCNC: 9.5 MG/DL
CHLORIDE SERPL-SCNC: 103 MMOL/L
CO2 SERPL-SCNC: 25 MMOL/L
CREAT SERPL-MCNC: 0.84 MG/DL
EGFR: 79 ML/MIN/1.73M2
EOSINOPHIL # BLD AUTO: 0.06 K/UL
EOSINOPHIL NFR BLD AUTO: 1.4 %
ESTIMATED AVERAGE GLUCOSE: 103 MG/DL
FERRITIN SERPL-MCNC: 88 NG/ML
GLUCOSE SERPL-MCNC: 97 MG/DL
HBA1C MFR BLD HPLC: 5.2 %
HCT VFR BLD CALC: 42.9 %
HGB BLD-MCNC: 13.9 G/DL
HIV1+2 AB SPEC QL IA.RAPID: NONREACTIVE
IMM GRANULOCYTES NFR BLD AUTO: 0.2 %
IRON SATN MFR SERPL: 30 %
IRON SERPL-MCNC: 124 UG/DL
LYMPHOCYTES # BLD AUTO: 1.21 K/UL
LYMPHOCYTES NFR BLD AUTO: 27.4 %
MAN DIFF?: NORMAL
MCHC RBC-ENTMCNC: 29 PG
MCHC RBC-ENTMCNC: 32.4 GM/DL
MCV RBC AUTO: 89.6 FL
MONOCYTES # BLD AUTO: 0.38 K/UL
MONOCYTES NFR BLD AUTO: 8.6 %
NEUTROPHILS # BLD AUTO: 2.74 K/UL
NEUTROPHILS NFR BLD AUTO: 61.9 %
PLATELET # BLD AUTO: 228 K/UL
POTASSIUM SERPL-SCNC: 4.4 MMOL/L
PROT SERPL-MCNC: 6.6 G/DL
RBC # BLD: 4.79 M/UL
RBC # FLD: 13.2 %
SODIUM SERPL-SCNC: 140 MMOL/L
TIBC SERPL-MCNC: 417 UG/DL
UIBC SERPL-MCNC: 293 UG/DL
WBC # FLD AUTO: 4.42 K/UL

## 2023-01-19 LAB
25(OH)D3 SERPL-MCNC: 38.3 NG/ML
APPEARANCE: ABNORMAL
BACTERIA: NEGATIVE
BILIRUBIN URINE: NEGATIVE
BLOOD URINE: NEGATIVE
COLOR: ABNORMAL
FOLATE SERPL-MCNC: 9.2 NG/ML
GLUCOSE QUALITATIVE U: NEGATIVE
HYALINE CASTS: 0 /LPF
KETONES URINE: NEGATIVE
LEUKOCYTE ESTERASE URINE: NEGATIVE
MICROSCOPIC-UA: NORMAL
NITRITE URINE: NEGATIVE
PH URINE: 6
PROTEIN URINE: NORMAL
RED BLOOD CELLS URINE: 2 /HPF
SPECIFIC GRAVITY URINE: 1.02
SQUAMOUS EPITHELIAL CELLS: 0 /HPF
TSH SERPL-ACNC: 1.34 UIU/ML
UROBILINOGEN URINE: NORMAL
VIT B12 SERPL-MCNC: 920 PG/ML
WHITE BLOOD CELLS URINE: 1 /HPF

## 2023-01-23 ENCOUNTER — RX RENEWAL (OUTPATIENT)
Age: 63
End: 2023-01-23

## 2023-02-01 ENCOUNTER — NON-APPOINTMENT (OUTPATIENT)
Age: 63
End: 2023-02-01

## 2023-02-01 LAB
BACTERIA UR CULT: NORMAL
CHOLEST SERPL-MCNC: 251 MG/DL
COPPER SERPL-MCNC: 132 UG/DL
HDLC SERPL-MCNC: 116 MG/DL
LDLC SERPL CALC-MCNC: 119 MG/DL
NONHDLC SERPL-MCNC: 135 MG/DL
TRIGL SERPL-MCNC: 78 MG/DL
VIT B1 SERPL-MCNC: 93.8 NMOL/L
ZINC SERPL-MCNC: 75 UG/DL

## 2023-04-21 ENCOUNTER — APPOINTMENT (OUTPATIENT)
Dept: INTERNAL MEDICINE | Facility: CLINIC | Age: 63
End: 2023-04-21
Payer: COMMERCIAL

## 2023-04-21 VITALS
SYSTOLIC BLOOD PRESSURE: 150 MMHG | BODY MASS INDEX: 38.57 KG/M2 | WEIGHT: 240 LBS | DIASTOLIC BLOOD PRESSURE: 80 MMHG | HEIGHT: 66 IN

## 2023-04-21 PROCEDURE — 36415 COLL VENOUS BLD VENIPUNCTURE: CPT

## 2023-04-21 PROCEDURE — 99214 OFFICE O/P EST MOD 30 MIN: CPT | Mod: 25

## 2023-04-24 ENCOUNTER — NON-APPOINTMENT (OUTPATIENT)
Age: 63
End: 2023-04-24

## 2023-04-24 LAB
ALBUMIN SERPL ELPH-MCNC: 4.3 G/DL
ALP BLD-CCNC: 66 U/L
ALT SERPL-CCNC: 22 U/L
ANION GAP SERPL CALC-SCNC: 12 MMOL/L
APPEARANCE: ABNORMAL
AST SERPL-CCNC: 22 U/L
BACTERIA UR CULT: NORMAL
BACTERIA: NEGATIVE /HPF
BASOPHILS # BLD AUTO: 0.02 K/UL
BASOPHILS NFR BLD AUTO: 0.4 %
BILIRUB SERPL-MCNC: 0.4 MG/DL
BILIRUBIN URINE: NEGATIVE
BLOOD URINE: NEGATIVE
BUN SERPL-MCNC: 26 MG/DL
CALCIUM SERPL-MCNC: 9.5 MG/DL
CAST: 1 /LPF
CHLORIDE SERPL-SCNC: 105 MMOL/L
CHOLEST SERPL-MCNC: 232 MG/DL
CO2 SERPL-SCNC: 24 MMOL/L
COLOR: YELLOW
CREAT SERPL-MCNC: 1.15 MG/DL
CRP SERPL-MCNC: <3 MG/L
EGFR: 54 ML/MIN/1.73M2
EOSINOPHIL # BLD AUTO: 0.12 K/UL
EOSINOPHIL NFR BLD AUTO: 2.1 %
EPITHELIAL CELLS: 2 /HPF
ERYTHROCYTE [SEDIMENTATION RATE] IN BLOOD BY WESTERGREN METHOD: 12 MM/HR
ESTIMATED AVERAGE GLUCOSE: 103 MG/DL
GLUCOSE QUALITATIVE U: NEGATIVE MG/DL
GLUCOSE SERPL-MCNC: 79 MG/DL
HBA1C MFR BLD HPLC: 5.2 %
HCT VFR BLD CALC: 43.8 %
HDLC SERPL-MCNC: 104 MG/DL
HGB BLD-MCNC: 13.2 G/DL
IMM GRANULOCYTES NFR BLD AUTO: 0.4 %
KETONES URINE: NEGATIVE MG/DL
LDLC SERPL CALC-MCNC: 109 MG/DL
LEUKOCYTE ESTERASE URINE: NEGATIVE
LYMPHOCYTES # BLD AUTO: 1.31 K/UL
LYMPHOCYTES NFR BLD AUTO: 23.4 %
MAN DIFF?: NORMAL
MCHC RBC-ENTMCNC: 28.2 PG
MCHC RBC-ENTMCNC: 30.1 GM/DL
MCV RBC AUTO: 93.6 FL
MICROSCOPIC-UA: NORMAL
MONOCYTES # BLD AUTO: 0.5 K/UL
MONOCYTES NFR BLD AUTO: 8.9 %
NEUTROPHILS # BLD AUTO: 3.62 K/UL
NEUTROPHILS NFR BLD AUTO: 64.8 %
NITRITE URINE: NEGATIVE
NONHDLC SERPL-MCNC: 128 MG/DL
PH URINE: 5.5
PLATELET # BLD AUTO: 248 K/UL
POTASSIUM SERPL-SCNC: 4 MMOL/L
PROT SERPL-MCNC: 6.5 G/DL
PROTEIN URINE: NEGATIVE MG/DL
RBC # BLD: 4.68 M/UL
RBC # FLD: 13.2 %
RED BLOOD CELLS URINE: 0 /HPF
SODIUM SERPL-SCNC: 142 MMOL/L
SPECIFIC GRAVITY URINE: 1.02
TRIGL SERPL-MCNC: 94 MG/DL
TSH SERPL-ACNC: 0.81 UIU/ML
UROBILINOGEN URINE: 0.2 MG/DL
WBC # FLD AUTO: 5.59 K/UL
WHITE BLOOD CELLS URINE: 1 /HPF

## 2023-04-24 NOTE — PHYSICAL EXAM
[No Acute Distress] : no acute distress [Well Nourished] : well nourished [PERRL] : pupils equal round and reactive to light [Normal Oropharynx] : the oropharynx was normal [No Accessory Muscle Use] : no accessory muscle use [Clear to Auscultation] : lungs were clear to auscultation bilaterally [Regular Rhythm] : with a regular rhythm [Normal S1, S2] : normal S1 and S2 [No Murmur] : no murmur heard [Normal] : the cranial nerves were intact [Sensation Tactile Decrease] : light touch was intact [2+] : left 2+ [Normal Affect] : the affect was normal [Normal Insight/Judgement] : insight and judgment were intact [de-identified] : mild allergic rhinitis; mild serous effusion b/l tm

## 2023-04-24 NOTE — HISTORY OF PRESENT ILLNESS
[FreeTextEntry8] : cc: headache\par \par frontal ha x daily x 2 weeks - intermittent - maximum 6/10 \par not a/w weakness, paresthesia\par no f/chills \par no vision loss\par \par

## 2023-04-27 ENCOUNTER — APPOINTMENT (OUTPATIENT)
Dept: INTERNAL MEDICINE | Facility: CLINIC | Age: 63
End: 2023-04-27
Payer: COMMERCIAL

## 2023-04-27 VITALS — DIASTOLIC BLOOD PRESSURE: 80 MMHG | RESPIRATION RATE: 14 BRPM | HEART RATE: 78 BPM | SYSTOLIC BLOOD PRESSURE: 148 MMHG

## 2023-04-27 VITALS — WEIGHT: 240 LBS | HEIGHT: 66 IN | BODY MASS INDEX: 38.57 KG/M2

## 2023-04-27 PROCEDURE — 99213 OFFICE O/P EST LOW 20 MIN: CPT

## 2023-04-30 NOTE — HISTORY OF PRESENT ILLNESS
[FreeTextEntry1] : P [de-identified] : Pt presents for evaluation of HTN- \par recently added amlodipine but hasn't seen much improvement

## 2023-05-03 ENCOUNTER — APPOINTMENT (OUTPATIENT)
Dept: INTERNAL MEDICINE | Facility: CLINIC | Age: 63
End: 2023-05-03
Payer: COMMERCIAL

## 2023-05-03 VITALS — SYSTOLIC BLOOD PRESSURE: 130 MMHG | DIASTOLIC BLOOD PRESSURE: 80 MMHG

## 2023-05-03 PROCEDURE — 99213 OFFICE O/P EST LOW 20 MIN: CPT

## 2023-05-03 RX ORDER — NALTREXONE HYDROCHLORIDE AND BUPROPION HYDROCHLORIDE 8; 90 MG/1; MG/1
8-90 TABLET, EXTENDED RELEASE ORAL
Qty: 180 | Refills: 0 | Status: DISCONTINUED | COMMUNITY
Start: 2023-01-17 | End: 2023-05-03

## 2023-05-03 NOTE — ASSESSMENT
[FreeTextEntry1] : if starts contrave to rtc in 2 weeks - insurance did not cover contrave - pt will call speciality pharmacy\par \par

## 2023-05-03 NOTE — HISTORY OF PRESENT ILLNESS
[FreeTextEntry1] : \par HTN  [de-identified] : HTN - now on amlodipine 10mg daily - is c/w chlorthalidone & losartan \par \par rare headache\par \par

## 2023-05-04 ENCOUNTER — APPOINTMENT (OUTPATIENT)
Dept: OBGYN | Facility: CLINIC | Age: 63
End: 2023-05-04
Payer: COMMERCIAL

## 2023-05-04 VITALS
HEART RATE: 86 BPM | SYSTOLIC BLOOD PRESSURE: 112 MMHG | HEIGHT: 66 IN | DIASTOLIC BLOOD PRESSURE: 76 MMHG | BODY MASS INDEX: 39.37 KG/M2 | WEIGHT: 245 LBS

## 2023-05-04 DIAGNOSIS — Z01.419 ENCOUNTER FOR GYNECOLOGICAL EXAMINATION (GENERAL) (ROUTINE) W/OUT ABNORMAL FINDINGS: ICD-10-CM

## 2023-05-04 PROCEDURE — 99396 PREV VISIT EST AGE 40-64: CPT

## 2023-05-04 RX ORDER — BUSPIRONE HYDROCHLORIDE 10 MG/1
10 TABLET ORAL TWICE DAILY
Refills: 0 | Status: COMPLETED | COMMUNITY
Start: 2023-01-17 | End: 2023-05-04

## 2023-05-04 NOTE — HISTORY OF PRESENT ILLNESS
[Mammogramdate] : 2023 [BreastSonogramDate] : 2023 [PapSmeardate] : 2021 [BoneDensityDate] : 2018-nl [ColonoscopyDate] : UTD

## 2023-05-04 NOTE — PHYSICAL EXAM

## 2023-05-24 ENCOUNTER — APPOINTMENT (OUTPATIENT)
Dept: INTERNAL MEDICINE | Facility: CLINIC | Age: 63
End: 2023-05-24
Payer: COMMERCIAL

## 2023-05-24 VITALS — DIASTOLIC BLOOD PRESSURE: 80 MMHG | SYSTOLIC BLOOD PRESSURE: 120 MMHG

## 2023-05-24 VITALS — BODY MASS INDEX: 39.22 KG/M2 | WEIGHT: 243 LBS

## 2023-05-24 PROCEDURE — 99213 OFFICE O/P EST LOW 20 MIN: CPT

## 2023-05-24 NOTE — HISTORY OF PRESENT ILLNESS
[FreeTextEntry1] : obesity [de-identified] : \par obesity - on contrave - 2nd week bid dosing - has not noticed any difference\par mood is good

## 2023-05-25 ENCOUNTER — TRANSCRIPTION ENCOUNTER (OUTPATIENT)
Age: 63
End: 2023-05-25

## 2023-06-27 ENCOUNTER — APPOINTMENT (OUTPATIENT)
Dept: INTERNAL MEDICINE | Facility: CLINIC | Age: 63
End: 2023-06-27
Payer: COMMERCIAL

## 2023-06-27 VITALS
SYSTOLIC BLOOD PRESSURE: 110 MMHG | DIASTOLIC BLOOD PRESSURE: 60 MMHG | BODY MASS INDEX: 39.05 KG/M2 | WEIGHT: 243 LBS | HEIGHT: 66 IN

## 2023-06-27 DIAGNOSIS — Z87.448 PERSONAL HISTORY OF OTHER DISEASES OF URINARY SYSTEM: ICD-10-CM

## 2023-06-27 PROCEDURE — 99214 OFFICE O/P EST MOD 30 MIN: CPT

## 2023-06-27 NOTE — HISTORY OF PRESENT ILLNESS
[FreeTextEntry1] : obesity  [de-identified] : \par obesity - tapered off contrave - did not lose any weight w/contrave\par would like to try wegovy \par \par HTN - on meds\par \par

## 2023-06-28 ENCOUNTER — NON-APPOINTMENT (OUTPATIENT)
Age: 63
End: 2023-06-28

## 2023-06-29 ENCOUNTER — APPOINTMENT (OUTPATIENT)
Dept: INTERNAL MEDICINE | Facility: CLINIC | Age: 63
End: 2023-06-29

## 2023-07-05 ENCOUNTER — APPOINTMENT (OUTPATIENT)
Dept: INTERNAL MEDICINE | Facility: CLINIC | Age: 63
End: 2023-07-05

## 2023-07-07 ENCOUNTER — RESULT REVIEW (OUTPATIENT)
Age: 63
End: 2023-07-07

## 2023-07-07 ENCOUNTER — APPOINTMENT (OUTPATIENT)
Dept: MAMMOGRAPHY | Facility: IMAGING CENTER | Age: 63
End: 2023-07-07
Payer: COMMERCIAL

## 2023-07-07 ENCOUNTER — OUTPATIENT (OUTPATIENT)
Dept: OUTPATIENT SERVICES | Facility: HOSPITAL | Age: 63
LOS: 1 days | End: 2023-07-07
Payer: COMMERCIAL

## 2023-07-07 ENCOUNTER — APPOINTMENT (OUTPATIENT)
Dept: ULTRASOUND IMAGING | Facility: IMAGING CENTER | Age: 63
End: 2023-07-07
Payer: COMMERCIAL

## 2023-07-07 DIAGNOSIS — R92.2 INCONCLUSIVE MAMMOGRAM: ICD-10-CM

## 2023-07-07 DIAGNOSIS — Z98.84 BARIATRIC SURGERY STATUS: Chronic | ICD-10-CM

## 2023-07-07 DIAGNOSIS — Z12.39 ENCOUNTER FOR OTHER SCREENING FOR MALIGNANT NEOPLASM OF BREAST: ICD-10-CM

## 2023-07-07 PROCEDURE — 77063 BREAST TOMOSYNTHESIS BI: CPT | Mod: 26

## 2023-07-07 PROCEDURE — 77067 SCR MAMMO BI INCL CAD: CPT | Mod: 26

## 2023-07-07 PROCEDURE — 76641 ULTRASOUND BREAST COMPLETE: CPT | Mod: 26,50

## 2023-07-07 PROCEDURE — 77067 SCR MAMMO BI INCL CAD: CPT

## 2023-07-07 PROCEDURE — 76641 ULTRASOUND BREAST COMPLETE: CPT

## 2023-07-07 PROCEDURE — 77063 BREAST TOMOSYNTHESIS BI: CPT

## 2023-07-14 ENCOUNTER — RX RENEWAL (OUTPATIENT)
Age: 63
End: 2023-07-14

## 2023-07-24 NOTE — PHYSICAL EXAM
[Obese, well nourished, in no acute distress] : obese, well nourished, in no acute distress [Normal] : PERRL, EOMI, no conjunctival injection, anicteric Patient unable to complete

## 2023-07-31 ENCOUNTER — RX CHANGE (OUTPATIENT)
Age: 63
End: 2023-07-31

## 2023-07-31 RX ORDER — SEMAGLUTIDE 0.25 MG/.5ML
0.25 INJECTION, SOLUTION SUBCUTANEOUS
Qty: 2 | Refills: 0 | Status: DISCONTINUED | COMMUNITY
Start: 2023-06-27 | End: 2023-07-31

## 2023-08-03 ENCOUNTER — RX CHANGE (OUTPATIENT)
Age: 63
End: 2023-08-03

## 2023-08-16 ENCOUNTER — APPOINTMENT (OUTPATIENT)
Dept: INTERNAL MEDICINE | Facility: CLINIC | Age: 63
End: 2023-08-16
Payer: COMMERCIAL

## 2023-08-16 VITALS
WEIGHT: 238 LBS | SYSTOLIC BLOOD PRESSURE: 120 MMHG | DIASTOLIC BLOOD PRESSURE: 80 MMHG | HEIGHT: 66 IN | BODY MASS INDEX: 38.25 KG/M2

## 2023-08-16 PROCEDURE — 99213 OFFICE O/P EST LOW 20 MIN: CPT

## 2023-08-16 RX ORDER — SEMAGLUTIDE 0.5 MG/.5ML
0.5 INJECTION, SOLUTION SUBCUTANEOUS
Qty: 1 | Refills: 0 | Status: DISCONTINUED | COMMUNITY
Start: 2023-07-31 | End: 2023-08-16

## 2023-08-16 NOTE — HISTORY OF PRESENT ILLNESS
[FreeTextEntry8] :   1 month of intermittent sharp L. sided buttock pain radiating to upper thigh - pain is 10/10 maximum -  occurs if sitting >30 minutes no pain with walking, no pain with sleeping no weakness  no parethesia no bowel or bladder incontinence no f/chills   taking meloxicam she had leftover without relief    obesity- has lost additional weight - on 2nd dose of wegovy 0.5mg - tolerating well

## 2023-08-16 NOTE — PHYSICAL EXAM
[No Acute Distress] : no acute distress [Well Nourished] : well nourished [PERRL] : pupils equal round and reactive to light [EOMI] : extraocular movements intact [No Accessory Muscle Use] : no accessory muscle use [Clear to Auscultation] : lungs were clear to auscultation bilaterally [Regular Rhythm] : with a regular rhythm [Normal S1, S2] : normal S1 and S2 [No Murmur] : no murmur heard [No Edema] : there was no peripheral edema [Soft] : abdomen soft [Non Tender] : non-tender [Non-distended] : non-distended [No Masses] : no abdominal mass palpated [No HSM] : no HSM [Normal Bowel Sounds] : normal bowel sounds [Normal Inguinal Nodes] : no inguinal lymphadenopathy [Normal Femoral Nodes] : no femoral lymphadenopathy [No CVA Tenderness] : no CVA  tenderness [No Spinal Tenderness] : no spinal tenderness [No Skin Lesions] : no skin lesions [Normal] : the cranial nerves were intact [Sensation Tactile Decrease] : light touch was intact [2+] : left 2+ [Normal Affect] : the affect was normal [Normal Insight/Judgement] : insight and judgment were intact

## 2023-09-11 ENCOUNTER — RX RENEWAL (OUTPATIENT)
Age: 63
End: 2023-09-11

## 2023-09-11 RX ORDER — LOSARTAN POTASSIUM 100 MG/1
100 TABLET, FILM COATED ORAL
Qty: 90 | Refills: 2 | Status: ACTIVE | COMMUNITY
Start: 2022-04-05 | End: 1900-01-01

## 2023-09-11 RX ORDER — MIRTAZAPINE 15 MG/1
15 TABLET, FILM COATED ORAL
Qty: 90 | Refills: 2 | Status: ACTIVE | COMMUNITY
Start: 2021-10-05 | End: 1900-01-01

## 2023-09-13 ENCOUNTER — APPOINTMENT (OUTPATIENT)
Dept: INTERNAL MEDICINE | Facility: CLINIC | Age: 63
End: 2023-09-13
Payer: COMMERCIAL

## 2023-09-13 VITALS
DIASTOLIC BLOOD PRESSURE: 80 MMHG | WEIGHT: 237 LBS | BODY MASS INDEX: 38.09 KG/M2 | HEIGHT: 66 IN | SYSTOLIC BLOOD PRESSURE: 110 MMHG

## 2023-09-13 DIAGNOSIS — Z23 ENCOUNTER FOR IMMUNIZATION: ICD-10-CM

## 2023-09-13 PROCEDURE — 99213 OFFICE O/P EST LOW 20 MIN: CPT | Mod: 25

## 2023-09-13 PROCEDURE — G0008: CPT

## 2023-09-13 PROCEDURE — 90686 IIV4 VACC NO PRSV 0.5 ML IM: CPT

## 2023-09-29 ENCOUNTER — INPATIENT (INPATIENT)
Facility: HOSPITAL | Age: 63
LOS: 6 days | Discharge: HOME | DRG: 394 | End: 2023-10-06
Payer: COMMERCIAL

## 2023-09-29 VITALS
SYSTOLIC BLOOD PRESSURE: 116 MMHG | OXYGEN SATURATION: 98 % | TEMPERATURE: 98 F | HEIGHT: 66 IN | RESPIRATION RATE: 18 BRPM | DIASTOLIC BLOOD PRESSURE: 77 MMHG | HEART RATE: 99 BPM | WEIGHT: 233.03 LBS

## 2023-09-29 DIAGNOSIS — Z98.84 BARIATRIC SURGERY STATUS: Chronic | ICD-10-CM

## 2023-09-29 DIAGNOSIS — R10.9 UNSPECIFIED ABDOMINAL PAIN: ICD-10-CM

## 2023-09-29 LAB
ALBUMIN SERPL ELPH-MCNC: 3.9 G/DL — SIGNIFICANT CHANGE UP (ref 3.3–5)
ALP SERPL-CCNC: 61 U/L — SIGNIFICANT CHANGE UP (ref 40–120)
ALT FLD-CCNC: 30 U/L — SIGNIFICANT CHANGE UP (ref 10–45)
ANION GAP SERPL CALC-SCNC: 11 MMOL/L — SIGNIFICANT CHANGE UP (ref 5–17)
APPEARANCE UR: CLEAR — SIGNIFICANT CHANGE UP
APTT BLD: 27 SEC — SIGNIFICANT CHANGE UP (ref 24.5–35.6)
AST SERPL-CCNC: 42 U/L — HIGH (ref 10–40)
BACTERIA # UR AUTO: ABNORMAL
BASE EXCESS BLDV CALC-SCNC: 8.3 MMOL/L — HIGH (ref -2–3)
BASOPHILS # BLD AUTO: 0.02 K/UL — SIGNIFICANT CHANGE UP (ref 0–0.2)
BASOPHILS NFR BLD AUTO: 0.2 % — SIGNIFICANT CHANGE UP (ref 0–2)
BILIRUB SERPL-MCNC: 0.6 MG/DL — SIGNIFICANT CHANGE UP (ref 0.2–1.2)
BILIRUB UR-MCNC: NEGATIVE — SIGNIFICANT CHANGE UP
BUN SERPL-MCNC: 18 MG/DL — SIGNIFICANT CHANGE UP (ref 7–23)
CA-I SERPL-SCNC: 1.22 MMOL/L — SIGNIFICANT CHANGE UP (ref 1.15–1.33)
CALCIUM SERPL-MCNC: 9.3 MG/DL — SIGNIFICANT CHANGE UP (ref 8.4–10.5)
CHLORIDE BLDV-SCNC: 102 MMOL/L — SIGNIFICANT CHANGE UP (ref 96–108)
CHLORIDE SERPL-SCNC: 102 MMOL/L — SIGNIFICANT CHANGE UP (ref 96–108)
CO2 BLDV-SCNC: 36 MMOL/L — HIGH (ref 22–26)
CO2 SERPL-SCNC: 25 MMOL/L — SIGNIFICANT CHANGE UP (ref 22–31)
COLOR SPEC: SIGNIFICANT CHANGE UP
CREAT SERPL-MCNC: 1.01 MG/DL — SIGNIFICANT CHANGE UP (ref 0.5–1.3)
DIFF PNL FLD: NEGATIVE — SIGNIFICANT CHANGE UP
EGFR: 63 ML/MIN/1.73M2 — SIGNIFICANT CHANGE UP
EOSINOPHIL # BLD AUTO: 0.11 K/UL — SIGNIFICANT CHANGE UP (ref 0–0.5)
EOSINOPHIL NFR BLD AUTO: 1.1 % — SIGNIFICANT CHANGE UP (ref 0–6)
EPI CELLS # UR: 2 /HPF — SIGNIFICANT CHANGE UP
GAS PNL BLDV: 136 MMOL/L — SIGNIFICANT CHANGE UP (ref 136–145)
GAS PNL BLDV: SIGNIFICANT CHANGE UP
GAS PNL BLDV: SIGNIFICANT CHANGE UP
GLUCOSE BLDV-MCNC: 91 MG/DL — SIGNIFICANT CHANGE UP (ref 70–99)
GLUCOSE SERPL-MCNC: 84 MG/DL — SIGNIFICANT CHANGE UP (ref 70–99)
GLUCOSE UR QL: NEGATIVE — SIGNIFICANT CHANGE UP
HCO3 BLDV-SCNC: 34 MMOL/L — HIGH (ref 22–29)
HCT VFR BLD CALC: 40 % — SIGNIFICANT CHANGE UP (ref 34.5–45)
HCT VFR BLDA CALC: 39 % — SIGNIFICANT CHANGE UP (ref 34.5–46.5)
HGB BLD CALC-MCNC: 13.1 G/DL — SIGNIFICANT CHANGE UP (ref 11.7–16.1)
HGB BLD-MCNC: 12.5 G/DL — SIGNIFICANT CHANGE UP (ref 11.5–15.5)
IMM GRANULOCYTES NFR BLD AUTO: 0.4 % — SIGNIFICANT CHANGE UP (ref 0–0.9)
INR BLD: 0.95 RATIO — SIGNIFICANT CHANGE UP (ref 0.85–1.18)
KETONES UR-MCNC: NEGATIVE — SIGNIFICANT CHANGE UP
LACTATE BLDV-MCNC: 1.1 MMOL/L — SIGNIFICANT CHANGE UP (ref 0.5–2)
LEUKOCYTE ESTERASE UR-ACNC: NEGATIVE — SIGNIFICANT CHANGE UP
LIDOCAIN IGE QN: 32 U/L — SIGNIFICANT CHANGE UP (ref 7–60)
LYMPHOCYTES # BLD AUTO: 0.9 K/UL — LOW (ref 1–3.3)
LYMPHOCYTES # BLD AUTO: 9.1 % — LOW (ref 13–44)
MCHC RBC-ENTMCNC: 29.3 PG — SIGNIFICANT CHANGE UP (ref 27–34)
MCHC RBC-ENTMCNC: 31.3 GM/DL — LOW (ref 32–36)
MCV RBC AUTO: 93.9 FL — SIGNIFICANT CHANGE UP (ref 80–100)
MONOCYTES # BLD AUTO: 0.5 K/UL — SIGNIFICANT CHANGE UP (ref 0–0.9)
MONOCYTES NFR BLD AUTO: 5 % — SIGNIFICANT CHANGE UP (ref 2–14)
NEUTROPHILS # BLD AUTO: 8.34 K/UL — HIGH (ref 1.8–7.4)
NEUTROPHILS NFR BLD AUTO: 84.2 % — HIGH (ref 43–77)
NITRITE UR-MCNC: NEGATIVE — SIGNIFICANT CHANGE UP
NRBC # BLD: 0 /100 WBCS — SIGNIFICANT CHANGE UP (ref 0–0)
PCO2 BLDV: 53 MMHG — HIGH (ref 39–42)
PH BLDV: 7.42 — SIGNIFICANT CHANGE UP (ref 7.32–7.43)
PH UR: 7.5 — SIGNIFICANT CHANGE UP (ref 5–8)
PLATELET # BLD AUTO: 248 K/UL — SIGNIFICANT CHANGE UP (ref 150–400)
PO2 BLDV: 20 MMHG — LOW (ref 25–45)
POTASSIUM BLDV-SCNC: 4.1 MMOL/L — SIGNIFICANT CHANGE UP (ref 3.5–5.1)
POTASSIUM SERPL-MCNC: 5 MMOL/L — SIGNIFICANT CHANGE UP (ref 3.5–5.3)
POTASSIUM SERPL-SCNC: 5 MMOL/L — SIGNIFICANT CHANGE UP (ref 3.5–5.3)
PROT SERPL-MCNC: 7.2 G/DL — SIGNIFICANT CHANGE UP (ref 6–8.3)
PROT UR-MCNC: ABNORMAL
PROTHROM AB SERPL-ACNC: 10.5 SEC — SIGNIFICANT CHANGE UP (ref 9.5–13)
RBC # BLD: 4.26 M/UL — SIGNIFICANT CHANGE UP (ref 3.8–5.2)
RBC # FLD: 13.4 % — SIGNIFICANT CHANGE UP (ref 10.3–14.5)
RBC CASTS # UR COMP ASSIST: 2 /HPF — SIGNIFICANT CHANGE UP (ref 0–4)
SAO2 % BLDV: 29.3 % — LOW (ref 67–88)
SODIUM SERPL-SCNC: 138 MMOL/L — SIGNIFICANT CHANGE UP (ref 135–145)
SP GR SPEC: >1.05 (ref 1.01–1.02)
UROBILINOGEN FLD QL: NEGATIVE — SIGNIFICANT CHANGE UP
WBC # BLD: 9.91 K/UL — SIGNIFICANT CHANGE UP (ref 3.8–10.5)
WBC # FLD AUTO: 9.91 K/UL — SIGNIFICANT CHANGE UP (ref 3.8–10.5)
WBC UR QL: 1 /HPF — SIGNIFICANT CHANGE UP (ref 0–5)

## 2023-09-29 PROCEDURE — 99285 EMERGENCY DEPT VISIT HI MDM: CPT

## 2023-09-29 PROCEDURE — 74177 CT ABD & PELVIS W/CONTRAST: CPT | Mod: 26,MA

## 2023-09-29 PROCEDURE — 99222 1ST HOSP IP/OBS MODERATE 55: CPT

## 2023-09-29 RX ORDER — METRONIDAZOLE 500 MG
TABLET ORAL
Refills: 0 | Status: DISCONTINUED | OUTPATIENT
Start: 2023-09-29 | End: 2023-10-06

## 2023-09-29 RX ORDER — LANOLIN ALCOHOL/MO/W.PET/CERES
5 CREAM (GRAM) TOPICAL AT BEDTIME
Refills: 0 | Status: DISCONTINUED | OUTPATIENT
Start: 2023-09-29 | End: 2023-10-06

## 2023-09-29 RX ORDER — ACETAMINOPHEN 500 MG
1000 TABLET ORAL ONCE
Refills: 0 | Status: COMPLETED | OUTPATIENT
Start: 2023-09-29 | End: 2023-09-29

## 2023-09-29 RX ORDER — LOSARTAN POTASSIUM 100 MG/1
100 TABLET, FILM COATED ORAL DAILY
Refills: 0 | Status: DISCONTINUED | OUTPATIENT
Start: 2023-09-29 | End: 2023-09-29

## 2023-09-29 RX ORDER — ACETAMINOPHEN 500 MG
1000 TABLET ORAL EVERY 6 HOURS
Refills: 0 | Status: DISCONTINUED | OUTPATIENT
Start: 2023-09-29 | End: 2023-10-01

## 2023-09-29 RX ORDER — HYDROMORPHONE HYDROCHLORIDE 2 MG/ML
0.25 INJECTION INTRAMUSCULAR; INTRAVENOUS; SUBCUTANEOUS EVERY 6 HOURS
Refills: 0 | Status: DISCONTINUED | OUTPATIENT
Start: 2023-09-29 | End: 2023-10-01

## 2023-09-29 RX ORDER — CIPROFLOXACIN LACTATE 400MG/40ML
400 VIAL (ML) INTRAVENOUS EVERY 12 HOURS
Refills: 0 | Status: DISCONTINUED | OUTPATIENT
Start: 2023-09-30 | End: 2023-10-06

## 2023-09-29 RX ORDER — PANTOPRAZOLE SODIUM 20 MG/1
40 TABLET, DELAYED RELEASE ORAL
Refills: 0 | Status: DISCONTINUED | OUTPATIENT
Start: 2023-09-29 | End: 2023-10-06

## 2023-09-29 RX ORDER — SODIUM CHLORIDE 9 MG/ML
1000 INJECTION, SOLUTION INTRAVENOUS
Refills: 0 | Status: DISCONTINUED | OUTPATIENT
Start: 2023-09-29 | End: 2023-09-29

## 2023-09-29 RX ORDER — AMLODIPINE BESYLATE 2.5 MG/1
10 TABLET ORAL DAILY
Refills: 0 | Status: DISCONTINUED | OUTPATIENT
Start: 2023-09-29 | End: 2023-10-01

## 2023-09-29 RX ORDER — CIPROFLOXACIN LACTATE 400MG/40ML
400 VIAL (ML) INTRAVENOUS ONCE
Refills: 0 | Status: COMPLETED | OUTPATIENT
Start: 2023-09-29 | End: 2023-09-29

## 2023-09-29 RX ORDER — METRONIDAZOLE 500 MG
500 TABLET ORAL EVERY 8 HOURS
Refills: 0 | Status: DISCONTINUED | OUTPATIENT
Start: 2023-09-29 | End: 2023-10-06

## 2023-09-29 RX ORDER — PANTOPRAZOLE SODIUM 20 MG/1
0 TABLET, DELAYED RELEASE ORAL
Qty: 0 | Refills: 0 | DISCHARGE

## 2023-09-29 RX ORDER — SODIUM CHLORIDE 9 MG/ML
1000 INJECTION, SOLUTION INTRAVENOUS
Refills: 0 | Status: DISCONTINUED | OUTPATIENT
Start: 2023-09-29 | End: 2023-09-30

## 2023-09-29 RX ORDER — AMLODIPINE BESYLATE 2.5 MG/1
10 TABLET ORAL DAILY
Refills: 0 | Status: DISCONTINUED | OUTPATIENT
Start: 2023-09-29 | End: 2023-09-29

## 2023-09-29 RX ORDER — PIPERACILLIN AND TAZOBACTAM 4; .5 G/20ML; G/20ML
3.38 INJECTION, POWDER, LYOPHILIZED, FOR SOLUTION INTRAVENOUS ONCE
Refills: 0 | Status: COMPLETED | OUTPATIENT
Start: 2023-09-29 | End: 2023-09-29

## 2023-09-29 RX ORDER — MORPHINE SULFATE 50 MG/1
4 CAPSULE, EXTENDED RELEASE ORAL ONCE
Refills: 0 | Status: DISCONTINUED | OUTPATIENT
Start: 2023-09-29 | End: 2023-09-29

## 2023-09-29 RX ORDER — ONDANSETRON 8 MG/1
4 TABLET, FILM COATED ORAL ONCE
Refills: 0 | Status: COMPLETED | OUTPATIENT
Start: 2023-09-29 | End: 2023-09-29

## 2023-09-29 RX ORDER — SODIUM CHLORIDE 9 MG/ML
1000 INJECTION INTRAMUSCULAR; INTRAVENOUS; SUBCUTANEOUS ONCE
Refills: 0 | Status: COMPLETED | OUTPATIENT
Start: 2023-09-29 | End: 2023-09-29

## 2023-09-29 RX ORDER — METRONIDAZOLE 500 MG
500 TABLET ORAL ONCE
Refills: 0 | Status: COMPLETED | OUTPATIENT
Start: 2023-09-29 | End: 2023-09-29

## 2023-09-29 RX ORDER — LOSARTAN POTASSIUM 100 MG/1
0 TABLET, FILM COATED ORAL
Qty: 0 | Refills: 0 | DISCHARGE

## 2023-09-29 RX ORDER — CHLORTHALIDONE 50 MG
12.5 TABLET ORAL DAILY
Refills: 0 | Status: DISCONTINUED | OUTPATIENT
Start: 2023-09-30 | End: 2023-10-06

## 2023-09-29 RX ORDER — ENOXAPARIN SODIUM 100 MG/ML
40 INJECTION SUBCUTANEOUS EVERY 24 HOURS
Refills: 0 | Status: DISCONTINUED | OUTPATIENT
Start: 2023-09-29 | End: 2023-10-03

## 2023-09-29 RX ORDER — HYDROMORPHONE HYDROCHLORIDE 2 MG/ML
0.5 INJECTION INTRAMUSCULAR; INTRAVENOUS; SUBCUTANEOUS EVERY 6 HOURS
Refills: 0 | Status: DISCONTINUED | OUTPATIENT
Start: 2023-09-29 | End: 2023-10-01

## 2023-09-29 RX ORDER — CIPROFLOXACIN LACTATE 400MG/40ML
VIAL (ML) INTRAVENOUS
Refills: 0 | Status: DISCONTINUED | OUTPATIENT
Start: 2023-09-29 | End: 2023-10-06

## 2023-09-29 RX ORDER — LOSARTAN POTASSIUM 100 MG/1
100 TABLET, FILM COATED ORAL DAILY
Refills: 0 | Status: DISCONTINUED | OUTPATIENT
Start: 2023-09-29 | End: 2023-10-06

## 2023-09-29 RX ADMIN — Medication 400 MILLIGRAM(S): at 11:10

## 2023-09-29 RX ADMIN — Medication 400 MILLIGRAM(S): at 21:20

## 2023-09-29 RX ADMIN — ONDANSETRON 4 MILLIGRAM(S): 8 TABLET, FILM COATED ORAL at 11:55

## 2023-09-29 RX ADMIN — MORPHINE SULFATE 4 MILLIGRAM(S): 50 CAPSULE, EXTENDED RELEASE ORAL at 15:37

## 2023-09-29 RX ADMIN — SODIUM CHLORIDE 1000 MILLILITER(S): 9 INJECTION INTRAMUSCULAR; INTRAVENOUS; SUBCUTANEOUS at 13:48

## 2023-09-29 RX ADMIN — Medication 5 MILLIGRAM(S): at 21:20

## 2023-09-29 RX ADMIN — MORPHINE SULFATE 4 MILLIGRAM(S): 50 CAPSULE, EXTENDED RELEASE ORAL at 16:42

## 2023-09-29 RX ADMIN — MORPHINE SULFATE 4 MILLIGRAM(S): 50 CAPSULE, EXTENDED RELEASE ORAL at 11:08

## 2023-09-29 RX ADMIN — Medication 200 MILLIGRAM(S): at 18:25

## 2023-09-29 RX ADMIN — SODIUM CHLORIDE 125 MILLILITER(S): 9 INJECTION, SOLUTION INTRAVENOUS at 16:42

## 2023-09-29 RX ADMIN — PIPERACILLIN AND TAZOBACTAM 200 GRAM(S): 4; .5 INJECTION, POWDER, LYOPHILIZED, FOR SOLUTION INTRAVENOUS at 14:48

## 2023-09-29 RX ADMIN — Medication 1000 MILLIGRAM(S): at 21:40

## 2023-09-29 RX ADMIN — Medication 1000 MILLIGRAM(S): at 15:37

## 2023-09-29 RX ADMIN — SODIUM CHLORIDE 125 MILLILITER(S): 9 INJECTION, SOLUTION INTRAVENOUS at 21:20

## 2023-09-29 RX ADMIN — Medication 100 MILLIGRAM(S): at 18:26

## 2023-09-29 NOTE — ED PROVIDER NOTE - OBJECTIVE STATEMENT
62-year-old female with history of hypertension, gastric sleeve, kyle ny bypass comes into the ED for sudden onset left sided abd pain yesterday.  She reports taking magnesium citrate yesterday because she thought this was just constipation.  She had 1 slightly loose bowel movement without any blood or black stool.  She is taking Tylenol without any improvement of her symptoms.  Currently she is in an 8 out of 10 pain on the left side that did not improve after a bowel movement yesterday.  She does not have any fevers but she does report some chills, she does not have any chest pain shortness of breath nausea vomiting urinary symptoms.

## 2023-09-29 NOTE — ED PROVIDER NOTE - PHYSICAL EXAMINATION
GENERAL: Not in acute distress, non-toxic appearing  HEAD: normocephalic, atraumatic  HEENT: EOMI, normal conjunctiva, oral mucosa moist, neck supple  CARDIAC: regular rate and rhythm, normal S1 and S2,  no appreciable murmurs  PULM: clear to ascultation bilaterally, no crackles, rales, rhonchi, or wheezing  GI: +abdomen distended, + severe tenderness to the left lower abd, soft, no guarding or rebound tenderness  : no suprapubic tenderness  NEURO: alert and oriented x 3, normal speech, no focal motor or sensory deficits, no gross neurologic deficit  MSK: No visible deformities, no peripheral edema, calf tenderness/redness/swelling  SKIN: No visible rashes, dry, well-perfused  PSYCH: appropriate mood and affect

## 2023-09-29 NOTE — H&P ADULT - NSHPLABSRESULTS_GEN_ALL_CORE
< from: CT Abdomen and Pelvis w/ Oral Cont and w/ IV Cont (09.29.23 @ 13:02) >    IMPRESSION:  Foreign body penetration, possibly a fishbone, of the descending colon   with associated microperforation.    Status post gastric bypass. Contrast in the excluded stomach could be due   to either reflux versus gastro-gastric fistula.    Findings were discussed with Dr. LESLEY ESQUIVEL on 9/29/2023 2:06 PM by   Dr. Marshall with read back confirmation.    --- End of Report ---    < end of copied text >

## 2023-09-29 NOTE — H&P ADULT - ASSESSMENT
62F with hx of low-transverse  w/ bilateral tubal ligation, laparoscopic sleeve gastrectomy by [facility] ([facility]), open conversion of sleeve to kyle-en-Y gastric bypass by [facility] (), and an abdominoplasty, who presents with left sided abdominal pain. She reports the pain started yesterday, accompanied by distention. Passing gas and stool. Of note the patient recently traveled to [facility] in 2023 and ate a large fish. CTAP significant for foreign body penetration, possibly a fishbone, of the descending colon with associated microperforation.    Plan:   - Admit to [facility], Dr. Dia.  - Zosyn.  - NPO/IVF.  - Lovenox for DVT ppx.  - Plan for possible scope/endoscopic retrieval.     Seen and discussed with Dr. Almeida. Discussed with Dr. Dia.    [facility]  [id number]

## 2023-09-29 NOTE — ED PROVIDER NOTE - NSICDXPASTMEDICALHX_GEN_ALL_CORE_FT
PAST MEDICAL HISTORY:  GERD (gastroesophageal reflux disease)     HTN (hypertension)     Morbidly Obese

## 2023-09-29 NOTE — ED PROVIDER NOTE - PROGRESS NOTE DETAILS
pt reassessed reports improvement in abd pain, she is informed of perforation, spoke w/ surgery they will be down to evaluate; pt reports pcn allergy was hives, she was counselled regarding zosyn and need for iv antibiotics, ok w/ trial of zosyn pt to have admission for further moniroting, reports she last ate fish 1 weekago

## 2023-09-29 NOTE — ED PROVIDER NOTE - NSICDXPASTSURGICALHX_GEN_ALL_CORE_FT
PAST SURGICAL HISTORY:   History age 39    Bariatric surgery status s/p gastric sleeve    Histoty of Tubal Ligation

## 2023-09-29 NOTE — H&P ADULT - NSHPPHYSICALEXAM_GEN_ALL_CORE
General: not acutely distressed  Neurological: AO X4  Respiratory: nonlabored respirations  Cardiac: pulse present  Abdominal: soft, mildly diffusely tender, distended, no rebound, no guarding, no palpable masses. Full-length midline laparotomy scar, port scars, Pfannenstiel scar   Extremities: warm

## 2023-09-29 NOTE — ED PROVIDER NOTE - CLINICAL SUMMARY MEDICAL DECISION MAKING FREE TEXT BOX
62-year-old female with history of hypertension, gastric sleeve, kyle ny bypass comes into the ED for sudden onset left sided abd pain yesterday. She has been tolerating PO. On exam, she has significant tenderness to the left lower abd and some abd distension. Given her bariatric surgery ddx includes but not limited to bowel obstruction, diverticulitis, pyelo tho less likely given no CVA tenderness. Will order CBC, CMP, lipase, blood gas with lactate, and CT abd pelvis with PO and IV contrast. Consult Surg as needed. Dispo pending work up and reeval.

## 2023-09-29 NOTE — PATIENT PROFILE ADULT - FALL HARM RISK - UNIVERSAL INTERVENTIONS
Bed in lowest position, wheels locked, appropriate side rails in place/Call bell, personal items and telephone in reach/Instruct patient to call for assistance before getting out of bed or chair/Non-slip footwear when patient is out of bed/[place] to call system/Physically safe environment - no spills, clutter or unnecessary equipment/Purposeful Proactive Rounding/Room/bathroom lighting operational, light cord in reach

## 2023-09-29 NOTE — ED ADULT NURSE NOTE - OBJECTIVE STATEMENT
61 y/o F w/ pmh of gastric bypass surgery, and currently on wegoby for weight loss came w/ complaints of abd pain and nausea x yesterday morning. Pt states that she had recently increased her dosage of wegoby last week and describes her pain as sharp constant pain. Pt states that she took Citron and had an episode of dark stool yesterday. Pt denies fever, chest pain, SOB. Pt A&Ox3 gross neuro intact, lungs cta bilaterally, no difficulty speaking in complete sentences, s1s2 heart sounds heard, pulses x 4, skin intact.

## 2023-09-29 NOTE — H&P ADULT - ATTENDING COMMENTS
seen and examined 23 @ 1530    ? - low-transverse  w/ bilateral tubal ligation  2017 @ [facility] - laparoscopic sleeve gastrectomy by [facility]   @ [facility] - open conversion of sleeve to kyle-en-Y gastric bypass by [facility]  ? - abdominoplasty    ate fish with bones while vacationing in [place] in Aug 2023    afeb  soft / mild diffuse tenderness / mildly distended / tympanitic  full-length midline laparotomy scar  laparoscopic scars  Pfannenstiel scar    WBC = 9  lactate - 1.1    CT abd/pelvis w/ contrast - retroperitoneal descending colon perforation with pericolonic fat stranding and a few dots of extraluminal air secondary to 5 cm fishbone, of which <1 cm remains in the lumen of the colon. sigmoid diverticulosis.      retroperitoneal colon perforation secondary to large ingested fishbone  -NPO / IVF / ABx  -plan colonoscopic removal of foreign body once inflammation improves      I reviewed her labs and radiologic images.  discussed with Dr Dia by phone who agrees to take over her care.  plan discussed with her cousin at bedside in the ED.

## 2023-09-29 NOTE — ED ADULT NURSE NOTE - HIV OFFER
Last dispensed # 35 on 12/13/2019 - 7 day rx.   Will refill at this time for 10 days.    No further refills at this dose.   Opt out

## 2023-09-29 NOTE — ED PROVIDER NOTE - ATTENDING CONTRIBUTION TO CARE
62 F w/ hx of HTN, gastric sleeve in 2016 followed by liposuction and then kyle-en-y sx in 2018 by Dr. Winslow presents to the ER w/ abd pain that started yesterday w/ associated n no vomiting. pt states that she took mag citrate, yesterday because she thought that she was constipated, pt states that she had 1 loose bowel movemetn w/ no blood or black stool, pt is taking apap w/ out improvement in sx. pt reports the pain is 8/10 upon arrival to the Er. On exam, pt is awake and alert in mild distress, she has clear lungs soft abdomen w/ distention and tender diffusely, she has no rebound nor guarding, she has no cva tenderness. pt findings c/f possible bowel obstruction vs intraabdominal infection. plan for labs imaging and bariatric consultation for further management of symptoms.

## 2023-09-30 LAB
ANION GAP SERPL CALC-SCNC: 16 MMOL/L — SIGNIFICANT CHANGE UP (ref 5–17)
BUN SERPL-MCNC: 12 MG/DL — SIGNIFICANT CHANGE UP (ref 7–23)
CALCIUM SERPL-MCNC: 8.7 MG/DL — SIGNIFICANT CHANGE UP (ref 8.4–10.5)
CHLORIDE SERPL-SCNC: 99 MMOL/L — SIGNIFICANT CHANGE UP (ref 96–108)
CO2 SERPL-SCNC: 21 MMOL/L — LOW (ref 22–31)
CREAT SERPL-MCNC: 0.97 MG/DL — SIGNIFICANT CHANGE UP (ref 0.5–1.3)
EGFR: 66 ML/MIN/1.73M2 — SIGNIFICANT CHANGE UP
GLUCOSE SERPL-MCNC: 73 MG/DL — SIGNIFICANT CHANGE UP (ref 70–99)
HCT VFR BLD CALC: 37.5 % — SIGNIFICANT CHANGE UP (ref 34.5–45)
HCV AB S/CO SERPL IA: 0.1 S/CO — SIGNIFICANT CHANGE UP (ref 0–0.99)
HCV AB SERPL-IMP: SIGNIFICANT CHANGE UP
HGB BLD-MCNC: 11.5 G/DL — SIGNIFICANT CHANGE UP (ref 11.5–15.5)
MAGNESIUM SERPL-MCNC: 2.6 MG/DL — SIGNIFICANT CHANGE UP (ref 1.6–2.6)
MCHC RBC-ENTMCNC: 29 PG — SIGNIFICANT CHANGE UP (ref 27–34)
MCHC RBC-ENTMCNC: 30.7 GM/DL — LOW (ref 32–36)
MCV RBC AUTO: 94.7 FL — SIGNIFICANT CHANGE UP (ref 80–100)
NRBC # BLD: 0 /100 WBCS — SIGNIFICANT CHANGE UP (ref 0–0)
PHOSPHATE SERPL-MCNC: 3.5 MG/DL — SIGNIFICANT CHANGE UP (ref 2.5–4.5)
PLATELET # BLD AUTO: 225 K/UL — SIGNIFICANT CHANGE UP (ref 150–400)
POTASSIUM SERPL-MCNC: 4.8 MMOL/L — SIGNIFICANT CHANGE UP (ref 3.5–5.3)
POTASSIUM SERPL-SCNC: 4.8 MMOL/L — SIGNIFICANT CHANGE UP (ref 3.5–5.3)
RBC # BLD: 3.96 M/UL — SIGNIFICANT CHANGE UP (ref 3.8–5.2)
RBC # FLD: 13.2 % — SIGNIFICANT CHANGE UP (ref 10.3–14.5)
SODIUM SERPL-SCNC: 136 MMOL/L — SIGNIFICANT CHANGE UP (ref 135–145)
WBC # BLD: 8.71 K/UL — SIGNIFICANT CHANGE UP (ref 3.8–10.5)
WBC # FLD AUTO: 8.71 K/UL — SIGNIFICANT CHANGE UP (ref 3.8–10.5)

## 2023-09-30 PROCEDURE — 99232 SBSQ HOSP IP/OBS MODERATE 35: CPT | Mod: GC

## 2023-09-30 RX ORDER — SOD SULF/SODIUM/NAHCO3/KCL/PEG
1000 SOLUTION, RECONSTITUTED, ORAL ORAL ONCE
Refills: 0 | Status: COMPLETED | OUTPATIENT
Start: 2023-09-30 | End: 2023-09-30

## 2023-09-30 RX ORDER — SOD SULF/SODIUM/NAHCO3/KCL/PEG
1000 SOLUTION, RECONSTITUTED, ORAL ORAL ONCE
Refills: 0 | Status: COMPLETED | OUTPATIENT
Start: 2023-10-01 | End: 2023-10-01

## 2023-09-30 RX ORDER — SODIUM CHLORIDE 9 MG/ML
1000 INJECTION, SOLUTION INTRAVENOUS
Refills: 0 | Status: DISCONTINUED | OUTPATIENT
Start: 2023-09-30 | End: 2023-09-30

## 2023-09-30 RX ORDER — DEXTROSE MONOHYDRATE, SODIUM CHLORIDE, AND POTASSIUM CHLORIDE 50; .745; 4.5 G/1000ML; G/1000ML; G/1000ML
1000 INJECTION, SOLUTION INTRAVENOUS
Refills: 0 | Status: DISCONTINUED | OUTPATIENT
Start: 2023-09-30 | End: 2023-10-05

## 2023-09-30 RX ADMIN — Medication 5 MILLIGRAM(S): at 23:41

## 2023-09-30 RX ADMIN — Medication 20 MILLIGRAM(S): at 18:15

## 2023-09-30 RX ADMIN — HYDROMORPHONE HYDROCHLORIDE 0.5 MILLIGRAM(S): 2 INJECTION INTRAMUSCULAR; INTRAVENOUS; SUBCUTANEOUS at 09:15

## 2023-09-30 RX ADMIN — Medication 1000 MILLILITER(S): at 14:35

## 2023-09-30 RX ADMIN — DEXTROSE MONOHYDRATE, SODIUM CHLORIDE, AND POTASSIUM CHLORIDE 75 MILLILITER(S): 50; .745; 4.5 INJECTION, SOLUTION INTRAVENOUS at 18:16

## 2023-09-30 RX ADMIN — Medication 400 MILLIGRAM(S): at 08:02

## 2023-09-30 RX ADMIN — HYDROMORPHONE HYDROCHLORIDE 0.5 MILLIGRAM(S): 2 INJECTION INTRAMUSCULAR; INTRAVENOUS; SUBCUTANEOUS at 08:53

## 2023-09-30 RX ADMIN — Medication 100 MILLIGRAM(S): at 20:18

## 2023-09-30 RX ADMIN — Medication 20 MILLIGRAM(S): at 05:37

## 2023-09-30 RX ADMIN — PANTOPRAZOLE SODIUM 40 MILLIGRAM(S): 20 TABLET, DELAYED RELEASE ORAL at 05:39

## 2023-09-30 RX ADMIN — Medication 100 MILLIGRAM(S): at 01:48

## 2023-09-30 RX ADMIN — ENOXAPARIN SODIUM 40 MILLIGRAM(S): 100 INJECTION SUBCUTANEOUS at 05:36

## 2023-09-30 RX ADMIN — Medication 200 MILLIGRAM(S): at 18:15

## 2023-09-30 RX ADMIN — Medication 200 MILLIGRAM(S): at 05:37

## 2023-09-30 RX ADMIN — DEXTROSE MONOHYDRATE, SODIUM CHLORIDE, AND POTASSIUM CHLORIDE 75 MILLILITER(S): 50; .745; 4.5 INJECTION, SOLUTION INTRAVENOUS at 23:41

## 2023-09-30 NOTE — PROGRESS NOTE ADULT - SUBJECTIVE AND OBJECTIVE BOX
Overnight: No acute events.    SUBJECTIVE: Patient seen and examined on AM rounds. Patient denies any complaints. NPO w sips. Denies nausea or vomiting. + Flatus. + BM. Voiding appropriately. Ambulating well. Denies fevers, chills, SOB, CP.      Vital Signs Last 24 Hrs  T(C): 36.8 (29 Sep 2023 20:29), Max: 36.8 (29 Sep 2023 17:46)  T(F): 98.3 (29 Sep 2023 20:29), Max: 98.3 (29 Sep 2023 17:46)  HR: 96 (29 Sep 2023 20:29) (91 - 99)  BP: 102/65 (29 Sep 2023 20:29) (100/65 - 116/77)  BP(mean): --  RR: 18 (29 Sep 2023 20:29) (18 - 20)  SpO2: 95% (29 Sep 2023 20:29) (92% - 99%)    Parameters below as of 29 Sep 2023 20:29  Patient On (Oxygen Delivery Method): room air        I&O's Detail    29 Sep 2023 07:01  -  30 Sep 2023 00:19  --------------------------------------------------------  IN:  Total IN: 0 mL    OUT:    Oral Fluid: 0 mL  Total OUT: 0 mL    Total NET: 0 mL          Physical Exam:  General: nad  Neurological: AO X4  Respiratory: nonlabored respirations  Cardiac: pulse present  Abdominal: soft, mildly diffusely tender, distended, no rebound, no guarding. Full-length midline laparotomy scar, port scars, Pfannenstiel scar       LABS:                        12.5   9.91  )-----------( 248      ( 29 Sep 2023 11:21 )             40.0     09-29    138  |  102  |  18  ----------------------------<  84  5.0   |  25  |  1.01    Ca    9.3      29 Sep 2023 11:21    TPro  7.2  /  Alb  3.9  /  TBili  0.6  /  DBili  x   /  AST  42<H>  /  ALT  30  /  AlkPhos  61  09-29    PT/INR - ( 29 Sep 2023 14:42 )   PT: 10.5 sec;   INR: 0.95 ratio         PTT - ( 29 Sep 2023 14:42 )  PTT:27.0 sec  Urinalysis Basic - ( 29 Sep 2023 14:42 )    Color: Light Yellow / Appearance: Clear / SG: >1.050 / pH: x  Gluc: x / Ketone: Negative  / Bili: Negative / Urobili: Negative   Blood: x / Protein: Trace / Nitrite: Negative   Leuk Esterase: Negative / RBC: 2 /hpf / WBC 1 /HPF   Sq Epi: x / Non Sq Epi: x / Bacteria: Few        RADIOLOGY & ADDITIONAL STUDIES:   Overnight: No acute events.    SUBJECTIVE: Patient seen and examined on AM rounds. Patient denies any complaints. NPO w sips. Denies nausea or vomiting. + Flatus. - BM, last BM loose diarrhea yesterday after taking mag citrate. Voiding appropriately. Ambulating well. Denies fevers, chills, SOB, CP.      Vital Signs Last 24 Hrs  T(C): 36.7 (30 Sep 2023 04:57), Max: 36.8 (29 Sep 2023 17:46)  T(F): 98.1 (30 Sep 2023 04:57), Max: 98.3 (29 Sep 2023 17:46)  HR: 92 (30 Sep 2023 04:57) (91 - 99)  BP: 100/68 (30 Sep 2023 04:57) (93/60 - 116/77)  BP(mean): --  RR: 18 (30 Sep 2023 04:57) (18 - 20)  SpO2: 93% (30 Sep 2023 04:57) (91% - 99%)    Parameters below as of 30 Sep 2023 04:57  Patient On (Oxygen Delivery Method): room air      I&O's Detail    29 Sep 2023 07:01  -  30 Sep 2023 07:00  --------------------------------------------------------  IN:    IV PiggyBack: 200 mL    IV PiggyBack: 100 mL    Lactated Ringers: 1500 mL  Total IN: 1800 mL    OUT:    Oral Fluid: 0 mL    Voided (mL): 650 mL  Total OUT: 650 mL    Total NET: 1150 mL        Physical Exam:  General: NAD  Neurological: AO X4  Respiratory: nonlabored respirations  Cardiac: pulse present  Abdominal: soft, mildly diffusely tender with maximum tenderness in LLQ, mildly distended, no rebound, no guarding. Full-length midline laparotomy scar, port scars, Pfannenstiel scar       LABS:  cret                        11.5   8.71  )-----------( 225      ( 30 Sep 2023 07:10 )             37.5     09-30    136  |  99  |  12  ----------------------------<  73  4.8   |  21<L>  |  0.97    Ca    8.7      30 Sep 2023 07:08  Phos  3.5     09-30  Mg     2.6     09-30    TPro  7.2  /  Alb  3.9  /  TBili  0.6  /  DBili  x   /  AST  42<H>  /  ALT  30  /  AlkPhos  61  09-29    PT/INR - ( 29 Sep 2023 14:42 )   PT: 10.5 sec;   INR: 0.95 ratio         PTT - ( 29 Sep 2023 14:42 )  PTT:27.0 sec

## 2023-09-30 NOTE — PROGRESS NOTE ADULT - ASSESSMENT
62F with hx of low-transverse  w/ bilateral tubal ligation, laparoscopic sleeve gastrectomy by [facility] ([place]), open conversion of sleeve to kyle-en-Y gastric bypass by [facility] (), and an abdominoplasty, who presents with left sided abdominal pain. She reports the pain started Thursday, accompanied by distention. Passing gas and stool. Of note the patient recently traveled to [facility] in 2023 and ate a large fish. CTAP significant for foreign body penetration, possibly a fishbone, of the descending colon with associated microperforation. Admitted for IV abx and possible scope/endoscopic retrieval.    Plan  - NPO  - IVF  - cipro flagyl  - c/w home meds  - pain control  - consult GI (need to consult)  [facility]  [id number]   62F with hx of low-transverse  w/ bilateral tubal ligation, laparoscopic sleeve gastrectomy by [facility] ([place]), open conversion of sleeve to kyle-en-Y gastric bypass by [facility] (), and an abdominoplasty, who presents with left sided abdominal pain. She reports the pain started Thursday, accompanied by distention. Passing gas and stool. Of note the patient recently traveled to [facility] in 2023 and ate a large fish. CTAP significant for foreign body penetration, possibly a fishbone, of the descending colon with associated microperforation. Admitted for IV abx.    Plan  - Serial abdominal exams  - NPO  - IVF  - cipro flagyl  - monitor bowel function  - c/w home meds  - pain control  - lovenox    [facility]  [id number]   62F with hx of low-transverse  w/ bilateral tubal ligation, laparoscopic sleeve gastrectomy by [facility] ([place]), open conversion of sleeve to kyle-en-Y gastric bypass by [facility] (), and an abdominoplasty, who presents with left sided abdominal pain. She reports the pain started Thursday, accompanied by distention. Passing gas and stool. Of note the patient recently traveled to [facility] in 2023 and ate a large fish. CTAP significant for foreign body penetration, possibly a fishbone, of the descending colon with associated microperforation. Admitted for IV abx.    Plan  - Serial abdominal exams  -  Moviprep today, /2 tomorrow for possible scope on Monday   - NPO w/ sips + chips  - IVF  - cipro flagyl  - monitor bowel function  - c/w home meds  - pain control  - lovenox    [facility]  [id number]

## 2023-09-30 NOTE — PROGRESS NOTE ADULT - ATTENDING COMMENTS
Surgery Attending    Remains afebrile  Mild abd discomfort    Continue NPO exc sips  Begin bowel prep for colonoscopy Monday

## 2023-10-01 LAB
ANION GAP SERPL CALC-SCNC: 8 MMOL/L — SIGNIFICANT CHANGE UP (ref 5–17)
BUN SERPL-MCNC: 8 MG/DL — SIGNIFICANT CHANGE UP (ref 7–23)
CALCIUM SERPL-MCNC: 8.9 MG/DL — SIGNIFICANT CHANGE UP (ref 8.4–10.5)
CHLORIDE SERPL-SCNC: 104 MMOL/L — SIGNIFICANT CHANGE UP (ref 96–108)
CO2 SERPL-SCNC: 26 MMOL/L — SIGNIFICANT CHANGE UP (ref 22–31)
CREAT SERPL-MCNC: 0.95 MG/DL — SIGNIFICANT CHANGE UP (ref 0.5–1.3)
EGFR: 68 ML/MIN/1.73M2 — SIGNIFICANT CHANGE UP
GLUCOSE SERPL-MCNC: 99 MG/DL — SIGNIFICANT CHANGE UP (ref 70–99)
HCT VFR BLD CALC: 36.3 % — SIGNIFICANT CHANGE UP (ref 34.5–45)
HGB BLD-MCNC: 11.1 G/DL — LOW (ref 11.5–15.5)
MAGNESIUM SERPL-MCNC: 2.3 MG/DL — SIGNIFICANT CHANGE UP (ref 1.6–2.6)
MCHC RBC-ENTMCNC: 29.1 PG — SIGNIFICANT CHANGE UP (ref 27–34)
MCHC RBC-ENTMCNC: 30.6 GM/DL — LOW (ref 32–36)
MCV RBC AUTO: 95 FL — SIGNIFICANT CHANGE UP (ref 80–100)
NRBC # BLD: 0 /100 WBCS — SIGNIFICANT CHANGE UP (ref 0–0)
PHOSPHATE SERPL-MCNC: 3.2 MG/DL — SIGNIFICANT CHANGE UP (ref 2.5–4.5)
PLATELET # BLD AUTO: 230 K/UL — SIGNIFICANT CHANGE UP (ref 150–400)
POTASSIUM SERPL-MCNC: 4.1 MMOL/L — SIGNIFICANT CHANGE UP (ref 3.5–5.3)
POTASSIUM SERPL-SCNC: 4.1 MMOL/L — SIGNIFICANT CHANGE UP (ref 3.5–5.3)
RBC # BLD: 3.82 M/UL — SIGNIFICANT CHANGE UP (ref 3.8–5.2)
RBC # FLD: 13 % — SIGNIFICANT CHANGE UP (ref 10.3–14.5)
SODIUM SERPL-SCNC: 138 MMOL/L — SIGNIFICANT CHANGE UP (ref 135–145)
WBC # BLD: 6.26 K/UL — SIGNIFICANT CHANGE UP (ref 3.8–10.5)
WBC # FLD AUTO: 6.26 K/UL — SIGNIFICANT CHANGE UP (ref 3.8–10.5)

## 2023-10-01 PROCEDURE — ZZZZZ: CPT

## 2023-10-01 RX ORDER — AMLODIPINE BESYLATE 2.5 MG/1
10 TABLET ORAL DAILY
Refills: 0 | Status: DISCONTINUED | OUTPATIENT
Start: 2023-10-01 | End: 2023-10-06

## 2023-10-01 RX ADMIN — Medication 12.5 MILLIGRAM(S): at 08:16

## 2023-10-01 RX ADMIN — Medication 20 MILLIGRAM(S): at 05:41

## 2023-10-01 RX ADMIN — Medication 100 MILLIGRAM(S): at 20:03

## 2023-10-01 RX ADMIN — Medication 200 MILLIGRAM(S): at 17:02

## 2023-10-01 RX ADMIN — DEXTROSE MONOHYDRATE, SODIUM CHLORIDE, AND POTASSIUM CHLORIDE 75 MILLILITER(S): 50; .745; 4.5 INJECTION, SOLUTION INTRAVENOUS at 20:03

## 2023-10-01 RX ADMIN — DEXTROSE MONOHYDRATE, SODIUM CHLORIDE, AND POTASSIUM CHLORIDE 75 MILLILITER(S): 50; .745; 4.5 INJECTION, SOLUTION INTRAVENOUS at 13:38

## 2023-10-01 RX ADMIN — PANTOPRAZOLE SODIUM 40 MILLIGRAM(S): 20 TABLET, DELAYED RELEASE ORAL at 05:41

## 2023-10-01 RX ADMIN — AMLODIPINE BESYLATE 10 MILLIGRAM(S): 2.5 TABLET ORAL at 08:18

## 2023-10-01 RX ADMIN — Medication 5 MILLIGRAM(S): at 21:34

## 2023-10-01 RX ADMIN — LOSARTAN POTASSIUM 100 MILLIGRAM(S): 100 TABLET, FILM COATED ORAL at 08:16

## 2023-10-01 RX ADMIN — DEXTROSE MONOHYDRATE, SODIUM CHLORIDE, AND POTASSIUM CHLORIDE 75 MILLILITER(S): 50; .745; 4.5 INJECTION, SOLUTION INTRAVENOUS at 08:16

## 2023-10-01 RX ADMIN — Medication 100 MILLIGRAM(S): at 13:38

## 2023-10-01 RX ADMIN — Medication 1000 MILLILITER(S): at 09:50

## 2023-10-01 RX ADMIN — Medication 200 MILLIGRAM(S): at 05:42

## 2023-10-01 RX ADMIN — ENOXAPARIN SODIUM 40 MILLIGRAM(S): 100 INJECTION SUBCUTANEOUS at 05:42

## 2023-10-01 RX ADMIN — Medication 100 MILLIGRAM(S): at 03:27

## 2023-10-01 RX ADMIN — Medication 10 MILLIGRAM(S): at 13:40

## 2023-10-01 RX ADMIN — Medication 20 MILLIGRAM(S): at 17:02

## 2023-10-01 NOTE — PRE PROCEDURE NOTE - CHANGES SINCE LAST HISTORY AND PHYSICAL EXAM
CARLA - Please call Senia/mom at [phone] in regards to form that was faxed yesterday for Simi.  Mom is asking for the original form to please be placed at  for .  Please leave her a message when this form has been brought to front reception.   No

## 2023-10-01 NOTE — PROGRESS NOTE ADULT - SUBJECTIVE AND OBJECTIVE BOX
Overnight: No acute events.    SUBJECTIVE: Patient seen and examined on AM rounds. Patient denies any complaints. Took first half of bowel prep yesterday. + Flatus. + loose BMs. Voiding appropriately. Ambulating well. Denies fevers, chills, SOB, CP.    Vital Signs Last 24 Hrs  T(C): 36.7 (01 Oct 2023 00:09), Max: 37.1 (30 Sep 2023 21:42)  T(F): 98.1 (01 Oct 2023 00:09), Max: 98.7 (30 Sep 2023 21:42)  HR: 91 (01 Oct 2023 00:09) (81 - 92)  BP: 100/64 (01 Oct 2023 00:09) (95/63 - 112/69)  BP(mean): --  RR: 18 (01 Oct 2023 00:09) (18 - 18)  SpO2: 96% (01 Oct 2023 00:09) (93% - 98%)    Parameters below as of 01 Oct 2023 00:09  Patient On (Oxygen Delivery Method): room air    I&O's Detail    29 Sep 2023 07:01  -  30 Sep 2023 07:00  --------------------------------------------------------  IN:    IV PiggyBack: 200 mL    IV PiggyBack: 100 mL    Lactated Ringers: 1500 mL  Total IN: 1800 mL    OUT:    Oral Fluid: 0 mL    Voided (mL): 650 mL  Total OUT: 650 mL    Total NET: 1150 mL      30 Sep 2023 07:01  -  01 Oct 2023 04:30  --------------------------------------------------------  IN:    dextrose 5% + sodium chloride 0.45% w/ Additives: 900 mL    IV PiggyBack: 200 mL    IV PiggyBack: 100 mL  Total IN: 1200 mL    OUT:    Voided (mL): 500 mL  Total OUT: 500 mL    Total NET: 700 mL      Physical Exam:  General: NAD  Neurological: AO X4  Respiratory: nonlabored respirations  Cardiac: pulse present  Abdominal: soft, mildly diffusely tender with maximum tenderness in LLQ, mildly distended, no rebound, no guarding. Full-length midline laparotomy scar, port scars, Pfannenstiel scar       LABS:  cret                        11.5   8.71  )-----------( 225      ( 30 Sep 2023 07:10 )             37.5     09-30    136  |  99  |  12  ----------------------------<  73  4.8   |  21<L>  |  0.97    Ca    8.7      30 Sep 2023 07:08  Phos  3.5     09-30  Mg     2.6     09-30    TPro  7.2  /  Alb  3.9  /  TBili  0.6  /  DBili  x   /  AST  42<H>  /  ALT  30  /  AlkPhos  61  09-29    PT/INR - ( 29 Sep 2023 14:42 )   PT: 10.5 sec;   INR: 0.95 ratio         PTT - ( 29 Sep 2023 14:42 )  PTT:27.0 sec Overnight: No acute events.    SUBJECTIVE: Patient seen and examined on AM rounds. Patient states pain and bloating are much better compared to yesterday. Took first half of bowel prep yesterday. + Flatus. + loose BMs, clearing up. Voiding appropriately. Ambulating well. Denies fevers, chills, SOB, CP.    Vital Signs Last 24 Hrs  T(C): 36.7 (01 Oct 2023 00:09), Max: 37.1 (30 Sep 2023 21:42)  T(F): 98.1 (01 Oct 2023 00:09), Max: 98.7 (30 Sep 2023 21:42)  HR: 91 (01 Oct 2023 00:09) (81 - 92)  BP: 100/64 (01 Oct 2023 00:09) (95/63 - 112/69)  BP(mean): --  RR: 18 (01 Oct 2023 00:09) (18 - 18)  SpO2: 96% (01 Oct 2023 00:09) (93% - 98%)    Parameters below as of 01 Oct 2023 00:09  Patient On (Oxygen Delivery Method): room air    I&O's Detail    29 Sep 2023 07:01  -  30 Sep 2023 07:00  --------------------------------------------------------  IN:    IV PiggyBack: 200 mL    IV PiggyBack: 100 mL    Lactated Ringers: 1500 mL  Total IN: 1800 mL    OUT:    Oral Fluid: 0 mL    Voided (mL): 650 mL  Total OUT: 650 mL    Total NET: 1150 mL      30 Sep 2023 07:01  -  01 Oct 2023 04:30  --------------------------------------------------------  IN:    dextrose 5% + sodium chloride 0.45% w/ Additives: 900 mL    IV PiggyBack: 200 mL    IV PiggyBack: 100 mL  Total IN: 1200 mL    OUT:    Voided (mL): 500 mL  Total OUT: 500 mL    Total NET: 700 mL      Physical Exam:  General: NAD  Neurological: AO X4  Respiratory: nonlabored respirations  Cardiac: pulse present  Abdominal: soft, mildly diffusely tender with maximum tenderness in LLQ, mildly distended, no rebound, no guarding. Full-length midline laparotomy scar, port scars, Pfannenstiel scar       LABS:  cret                        11.5   8.71  )-----------( 225      ( 30 Sep 2023 07:10 )             37.5     09-30    136  |  99  |  12  ----------------------------<  73  4.8   |  21<L>  |  0.97    Ca    8.7      30 Sep 2023 07:08  Phos  3.5     09-30  Mg     2.6     09-30    TPro  7.2  /  Alb  3.9  /  TBili  0.6  /  DBili  x   /  AST  42<H>  /  ALT  30  /  AlkPhos  61  09-29    PT/INR - ( 29 Sep 2023 14:42 )   PT: 10.5 sec;   INR: 0.95 ratio         PTT - ( 29 Sep 2023 14:42 )  PTT:27.0 sec

## 2023-10-01 NOTE — PRE PROCEDURE NOTE - PRE PROCEDURE EVALUATION
Interventional Radiology Pre-Procedure Note    This is a 62y Female w/ PMH of sleeve gastrectomy converted to Khadijah-en-Y in 2019, presenting w/ abdominal pain after eating a large fish. CT w/ concern for possible colonic microperforation secondary to fishbone in the distal descending/proximal sigmoid colon, scheduled for colonoscopy with possible foreign body extraction on Monday 10/2.    Procedure: Endoscopy, possible foreign body extraction.    Diagnosis/Indication: Patient is a 62y old  Female who presents with a chief complaint of Perforated colon (01 Oct 2023 04:28)      PAST MEDICAL & SURGICAL HISTORY:  Morbidly Obese      HTN (hypertension)      GERD (gastroesophageal reflux disease)       History  age 39      Histoty of Tubal Ligation      Bariatric surgery status  s/p gastric sleeve           Female    Allergies: penicillin (Hives)      LABS:  CBC Full  -  ( 01 Oct 2023 06:37 )  WBC Count : 6.26 K/uL  RBC Count : 3.82 M/uL  Hemoglobin : 11.1 g/dL  Hematocrit : 36.3 %  Platelet Count - Automated : 230 K/uL  Mean Cell Volume : 95.0 fl  Mean Cell Hemoglobin : 29.1 pg  Mean Cell Hemoglobin Concentration : 30.6 gm/dL  Auto Neutrophil # : x  Auto Lymphocyte # : x  Auto Monocyte # : x  Auto Eosinophil # : x  Auto Basophil # : x  Auto Neutrophil % : x  Auto Lymphocyte % : x  Auto Monocyte % : x  Auto Eosinophil % : x  Auto Basophil % : x    10-01    138  |  104  |  8   ----------------------------<  99  4.1   |  26  |  0.95    Ca    8.9      01 Oct 2023 06:35  Phos  3.2     10-01  Mg     2.3     10-01      PT/INR - ( 29 Sep 2023 14:42 )   PT: 10.5 sec;   INR: 0.95 ratio         PTT - ( 29 Sep 2023 14:42 )  PTT:27.0 sec    Patient present at bedside to sign consent. Surgery Pre-Procedure Note    This is a 62y Female w/ PMH of sleeve gastrectomy converted to Khadijah-en-Y in 2019, presenting w/ abdominal pain after eating a large fish. CT w/ concern for possible colonic microperforation secondary to fishbone in the distal descending/proximal sigmoid colon, scheduled for colonoscopy with possible foreign body extraction on Monday 10/2.    Procedure: Endoscopy, possible foreign body extraction.    Diagnosis/Indication: Patient is a 62y old  Female who presents with a chief complaint of Perforated colon (01 Oct 2023 04:28)      PAST MEDICAL & SURGICAL HISTORY:  Morbidly Obese      HTN (hypertension)      GERD (gastroesophageal reflux disease)       History  age 39      Histoty of Tubal Ligation      Bariatric surgery status  s/p gastric sleeve           Female    Allergies: penicillin (Hives)      LABS:  CBC Full  -  ( 01 Oct 2023 06:37 )  WBC Count : 6.26 K/uL  RBC Count : 3.82 M/uL  Hemoglobin : 11.1 g/dL  Hematocrit : 36.3 %  Platelet Count - Automated : 230 K/uL  Mean Cell Volume : 95.0 fl  Mean Cell Hemoglobin : 29.1 pg  Mean Cell Hemoglobin Concentration : 30.6 gm/dL  Auto Neutrophil # : x  Auto Lymphocyte # : x  Auto Monocyte # : x  Auto Eosinophil # : x  Auto Basophil # : x  Auto Neutrophil % : x  Auto Lymphocyte % : x  Auto Monocyte % : x  Auto Eosinophil % : x  Auto Basophil % : x    10-01    138  |  104  |  8   ----------------------------<  99  4.1   |  26  |  0.95    Ca    8.9      01 Oct 2023 06:35  Phos  3.2     10-01  Mg     2.3     10-01      PT/INR - ( 29 Sep 2023 14:42 )   PT: 10.5 sec;   INR: 0.95 ratio         PTT - ( 29 Sep 2023 14:42 )  PTT:27.0 sec    Patient present at bedside to sign consent. Surgery Pre-Procedure Note    This is a 62y Female w/ PMH of sleeve gastrectomy converted to Khadijah-en-Y in 2019, presenting w/ abdominal pain after eating a large fish. CT w/ concern for possible colonic microperforation secondary to fishbone in the distal descending/proximal sigmoid colon, scheduled for colonoscopy with possible foreign body extraction on Monday 10/2.    Procedure: Endoscopy, possible foreign body extraction.    Diagnosis/Indication: Patient is a 62y old  Female who presents with a chief complaint of Perforated colon (01 Oct 2023 04:28)      PAST MEDICAL & SURGICAL HISTORY:  Morbidly Obese      HTN (hypertension)      GERD (gastroesophageal reflux disease)       History  age 39      Histoty of Tubal Ligation      Bariatric surgery status  s/p gastric sleeve           Female    Allergies: penicillin (Hives)      LABS:  CBC Full  -  ( 01 Oct 2023 06:37 )  WBC Count : 6.26 K/uL  RBC Count : 3.82 M/uL  Hemoglobin : 11.1 g/dL  Hematocrit : 36.3 %  Platelet Count - Automated : 230 K/uL  Mean Cell Volume : 95.0 fl  Mean Cell Hemoglobin : 29.1 pg  Mean Cell Hemoglobin Concentration : 30.6 gm/dL  Auto Neutrophil # : x  Auto Lymphocyte # : x  Auto Monocyte # : x  Auto Eosinophil # : x  Auto Basophil # : x  Auto Neutrophil % : x  Auto Lymphocyte % : x  Auto Monocyte % : x  Auto Eosinophil % : x  Auto Basophil % : x    10-01    138  |  104  |  8   ----------------------------<  99  4.1   |  26  |  0.95    Ca    8.9      01 Oct 2023 06:35  Phos  3.2     10-01  Mg     2.3     10-01      PT/INR - ( 29 Sep 2023 14:42 )   PT: 10.5 sec;   INR: 0.95 ratio         PTT - ( 29 Sep 2023 14:42 )  PTT:27.0 sec    Patient able to sign consent.

## 2023-10-01 NOTE — PROGRESS NOTE ADULT - ASSESSMENT
62F with hx of low-transverse  w/ bilateral tubal ligation, laparoscopic sleeve gastrectomy by [facility] ([place]), open conversion of sleeve to kyle-en-Y gastric bypass by [facility] (), and an abdominoplasty, who presents with left sided abdominal pain. She reports the pain started Thursday, accompanied by distention. Passing gas and stool. Of note the patient recently traveled to [facility] in 2023 and ate a large fish. CTAP significant for foreign body penetration, possibly a fishbone, of the descending colon with associated microperforation. Admitted for IV abx.    Plan  - Serial abdominal exams overnight stable  - /2 Moviprep yesterday, 1/2 todayfor possible scope on Monday   - CLD  - IVF  - cipro flagyl  - monitor bowel function  - c/w home meds  - pain control  - lovenox    [facility]  [id number]   62F with hx of low-transverse  w/ bilateral tubal ligation, laparoscopic sleeve gastrectomy by [facility] ([place]), open conversion of sleeve to kyle-en-Y gastric bypass by [facility] (), and an abdominoplasty, who presents with left sided abdominal pain. She reports the pain started Thursday, accompanied by distention. Passing gas and stool. Of note the patient recently traveled to [facility] in 2023 and ate a large fish. CTAP significant for foreign body penetration, possibly a fishbone, of the descending colon with associated microperforation. Admitted for IV abx.    Plan  - Serial abdominal exams overnight stable  - / Moviprep yesterday, 12 today for scope on Monday w/ Dr Dia  - CLD  - IVF  - cipro flagyl  - monitor bowel function  - c/w home meds  - pain control  - lovenox    [facility]  [id number]

## 2023-10-02 ENCOUNTER — RESULT REVIEW (OUTPATIENT)
Age: 63
End: 2023-10-02

## 2023-10-02 LAB
ANION GAP SERPL CALC-SCNC: 11 MMOL/L — SIGNIFICANT CHANGE UP (ref 5–17)
BUN SERPL-MCNC: 6 MG/DL — LOW (ref 7–23)
CALCIUM SERPL-MCNC: 8.7 MG/DL — SIGNIFICANT CHANGE UP (ref 8.4–10.5)
CHLORIDE SERPL-SCNC: 104 MMOL/L — SIGNIFICANT CHANGE UP (ref 96–108)
CO2 SERPL-SCNC: 24 MMOL/L — SIGNIFICANT CHANGE UP (ref 22–31)
CREAT SERPL-MCNC: 1.03 MG/DL — SIGNIFICANT CHANGE UP (ref 0.5–1.3)
EGFR: 62 ML/MIN/1.73M2 — SIGNIFICANT CHANGE UP
GLUCOSE SERPL-MCNC: 99 MG/DL — SIGNIFICANT CHANGE UP (ref 70–99)
HCT VFR BLD CALC: 35.6 % — SIGNIFICANT CHANGE UP (ref 34.5–45)
HGB BLD-MCNC: 11 G/DL — LOW (ref 11.5–15.5)
MAGNESIUM SERPL-MCNC: 2.1 MG/DL — SIGNIFICANT CHANGE UP (ref 1.6–2.6)
MCHC RBC-ENTMCNC: 28.8 PG — SIGNIFICANT CHANGE UP (ref 27–34)
MCHC RBC-ENTMCNC: 30.9 GM/DL — LOW (ref 32–36)
MCV RBC AUTO: 93.2 FL — SIGNIFICANT CHANGE UP (ref 80–100)
NRBC # BLD: 0 /100 WBCS — SIGNIFICANT CHANGE UP (ref 0–0)
PHOSPHATE SERPL-MCNC: 3.5 MG/DL — SIGNIFICANT CHANGE UP (ref 2.5–4.5)
PLATELET # BLD AUTO: 262 K/UL — SIGNIFICANT CHANGE UP (ref 150–400)
POTASSIUM SERPL-MCNC: 4.1 MMOL/L — SIGNIFICANT CHANGE UP (ref 3.5–5.3)
POTASSIUM SERPL-SCNC: 4.1 MMOL/L — SIGNIFICANT CHANGE UP (ref 3.5–5.3)
RBC # BLD: 3.82 M/UL — SIGNIFICANT CHANGE UP (ref 3.8–5.2)
RBC # FLD: 12.9 % — SIGNIFICANT CHANGE UP (ref 10.3–14.5)
SODIUM SERPL-SCNC: 139 MMOL/L — SIGNIFICANT CHANGE UP (ref 135–145)
WBC # BLD: 5.17 K/UL — SIGNIFICANT CHANGE UP (ref 3.8–10.5)
WBC # FLD AUTO: 5.17 K/UL — SIGNIFICANT CHANGE UP (ref 3.8–10.5)

## 2023-10-02 PROCEDURE — 74177 CT ABD & PELVIS W/CONTRAST: CPT | Mod: 26

## 2023-10-02 PROCEDURE — 88305 TISSUE EXAM BY PATHOLOGIST: CPT | Mod: 26

## 2023-10-02 RX ORDER — SODIUM CHLORIDE 9 MG/ML
500 INJECTION INTRAMUSCULAR; INTRAVENOUS; SUBCUTANEOUS
Refills: 0 | Status: DISCONTINUED | OUTPATIENT
Start: 2023-10-02 | End: 2023-10-03

## 2023-10-02 RX ADMIN — DEXTROSE MONOHYDRATE, SODIUM CHLORIDE, AND POTASSIUM CHLORIDE 75 MILLILITER(S): 50; .745; 4.5 INJECTION, SOLUTION INTRAVENOUS at 05:40

## 2023-10-02 RX ADMIN — Medication 100 MILLIGRAM(S): at 11:23

## 2023-10-02 RX ADMIN — Medication 20 MILLIGRAM(S): at 17:22

## 2023-10-02 RX ADMIN — DEXTROSE MONOHYDRATE, SODIUM CHLORIDE, AND POTASSIUM CHLORIDE 75 MILLILITER(S): 50; .745; 4.5 INJECTION, SOLUTION INTRAVENOUS at 11:24

## 2023-10-02 RX ADMIN — Medication 20 MILLIGRAM(S): at 05:40

## 2023-10-02 RX ADMIN — ENOXAPARIN SODIUM 40 MILLIGRAM(S): 100 INJECTION SUBCUTANEOUS at 05:40

## 2023-10-02 RX ADMIN — Medication 200 MILLIGRAM(S): at 05:40

## 2023-10-02 RX ADMIN — Medication 200 MILLIGRAM(S): at 17:22

## 2023-10-02 RX ADMIN — LOSARTAN POTASSIUM 100 MILLIGRAM(S): 100 TABLET, FILM COATED ORAL at 05:42

## 2023-10-02 RX ADMIN — Medication 100 MILLIGRAM(S): at 20:10

## 2023-10-02 RX ADMIN — Medication 100 MILLIGRAM(S): at 03:36

## 2023-10-02 RX ADMIN — Medication 12.5 MILLIGRAM(S): at 05:41

## 2023-10-02 RX ADMIN — PANTOPRAZOLE SODIUM 40 MILLIGRAM(S): 20 TABLET, DELAYED RELEASE ORAL at 05:42

## 2023-10-02 NOTE — CONSULT NOTE ADULT - SUBJECTIVE AND OBJECTIVE BOX
62F with hx of low-transverse  w/ bilateral tubal ligation, laparoscopic sleeve gastrectomy conversion of sleeve to kyle-en-Y gastric bypass  presents with left sided abdominal pain.  CTAP significant for foreign body penetration, possibly a fishbone, of the descending colon with associated microperforation.     GI unable to remove endoscopically.   Consulted by surgery for percutaneous options.    No percutaneous options for retrieval of foreign body. No drainable/associated fluid collections to provide access to space   Intraperitoneal location not suitable for fluoroscopic guided dissection of foreign body  Continue management per surgery

## 2023-10-02 NOTE — PRE PROCEDURE NOTE - PRE PROCEDURE EVALUATION
Attending Physician:  Dr Dia                           Procedure: colonoscopy     Indication for Procedure: FB in ileum   ________________________________________________________  PAST MEDICAL & SURGICAL HISTORY:  Morbidly Obese      HTN (hypertension)      GERD (gastroesophageal reflux disease)       History  age 39      Histoty of Tubal Ligation      Bariatric surgery status  s/p gastric sleeve        ALLERGIES:  penicillin (Hives)    HOME MEDICATIONS:  amLODIPine 10 mg oral tablet: 1 tab(s) orally once a day  busPIRone 10 mg oral tablet: 2 tab(s) orally 2 times a day  chlorthalidone 25 mg oral tablet: 0.5 tab(s) orally once a day  losartan 100 mg oral tablet: 1 tab(s) orally once a day  melatonin 5 mg oral tablet: 1 tab(s) orally once a day (at bedtime) as needed for  insomnia  mirtazapine 15 mg oral tablet: 1 tab(s) orally once a day (at bedtime) as needed for AMS  pantoprazole 40 mg oral delayed release tablet: 1 tab(s) orally once a day  Wegovy (1 mg dose) subcutaneous solution: 1 milligram(s) subcutaneously once a week    AICD/PPM: [ ] yes   [x ] no    PERTINENT LAB DATA:                        11.0   5.17  )-----------( 262      ( 02 Oct 2023 06:10 )             35.6     10-02    139  |  104  |  6<L>  ----------------------------<  99  4.1   |  24  |  1.03    Ca    8.7      02 Oct 2023 06:10  Phos  3.5     10-02  Mg     2.1     10-                  PHYSICAL EXAMINATION:    Height (cm): 167.6  Weight (kg): 105.7  BMI (kg/m2): 37.6  BSA (m2): 2.13T(C): 36.8  HR: 65  BP: 113/63  RR: 12  SpO2: 98%    Constitutional: NAD    Neck:  No JVD  Respiratory: no resp distress   Cardiovascular: rrr  Extremities: No peripheral edema  Neurological: A/O x 3, no focal deficits        COMMENTS:    The patient is a suitable candidate for the planned procedure unless box checked [ ]  No, explain:

## 2023-10-02 NOTE — PROGRESS NOTE ADULT - ATTENDING COMMENTS
reports minimal pain, minimally tender, for colonoscopy today with planned FB extraction reports minimal pain, minimally tender, for colonoscopy today with planned FB extraction.  Indications risks benefits alternatives reviewed including but not limited to bleeding, worsening infection and inability to remove.  Significant other at bedside and all questions answered.

## 2023-10-02 NOTE — PROGRESS NOTE ADULT - SUBJECTIVE AND OBJECTIVE BOX
Overnight: No acute events.    SUBJECTIVE: Patient seen and examined on AM rounds. Patient states pain and bloating improving. Tolerating bowel prep. On CLD for scope today. + Flatus. + loose BMs, clearing up. Voiding appropriately. Ambulating well. Denies fevers, chills, SOB, CP.    Vital Signs Last 24 Hrs  T(C): 36.7 (02 Oct 2023 00:36), Max: 37.1 (01 Oct 2023 12:32)  T(F): 98.1 (02 Oct 2023 00:36), Max: 98.7 (01 Oct 2023 12:32)  HR: 89 (02 Oct 2023 00:36) (81 - 90)  BP: 105/72 (02 Oct 2023 00:36) (102/68 - 137/74)  BP(mean): --  RR: 18 (02 Oct 2023 00:36) (18 - 18)  SpO2: 97% (02 Oct 2023 00:36) (95% - 98%)    Parameters below as of 02 Oct 2023 00:36  Patient On (Oxygen Delivery Method): room air        I&O's Detail    30 Sep 2023 07:01  -  01 Oct 2023 07:00  --------------------------------------------------------  IN:    dextrose 5% + sodium chloride 0.45% w/ Additives: 1800 mL    IV PiggyBack: 200 mL    IV PiggyBack: 100 mL    IV PiggyBack: 200 mL  Total IN: 2300 mL    OUT:    Voided (mL): 1100 mL  Total OUT: 1100 mL    Total NET: 1200 mL      01 Oct 2023 07:01  -  02 Oct 2023 01:36  --------------------------------------------------------  IN:  Total IN: 0 mL    OUT:    Voided (mL): 300 mL  Total OUT: 300 mL    Total NET: -300 mL        Physical Exam:  General: NAD  Neurological: AO X4  Respiratory: nonlabored respirations  Cardiac: pulse present  Abdominal: soft, mildly diffusely tender with maximum tenderness in LLQ, mildly distended, no rebound, no guarding. Full-length midline laparotomy scar, port scars, Pfannenstiel scar     LABS:                        11.1   6.26  )-----------( 230      ( 01 Oct 2023 06:37 )             36.3     10-01    138  |  104  |  8   ----------------------------<  99  4.1   |  26  |  0.95    Ca    8.9      01 Oct 2023 06:35  Phos  3.2     10-01  Mg     2.3     10-01        Urinalysis Basic - ( 01 Oct 2023 06:35 )    Color: x / Appearance: x / SG: x / pH: x  Gluc: 99 mg/dL / Ketone: x  / Bili: x / Urobili: x   Blood: x / Protein: x / Nitrite: x   Leuk Esterase: x / RBC: x / WBC x   Sq Epi: x / Non Sq Epi: x / Bacteria: x        RADIOLOGY & ADDITIONAL STUDIES:   Overnight: No acute events.    SUBJECTIVE: Patient seen and examined on AM rounds. Patient states pain and bloating improving. Tolerating bowel prep. NPO  for scope today. + Flatus. + loose BMs, clearing up. Voiding appropriately. Ambulating well. Denies fevers, chills, SOB, CP.    Vital Signs Last 24 Hrs  T(C): 36.8 (02 Oct 2023 05:23), Max: 37.1 (01 Oct 2023 12:32)  T(F): 98.3 (02 Oct 2023 05:23), Max: 98.7 (01 Oct 2023 12:32)  HR: 82 (02 Oct 2023 05:23) (81 - 90)  BP: 107/67 (02 Oct 2023 05:23) (105/72 - 137/74)  BP(mean): --  RR: 18 (02 Oct 2023 05:23) (18 - 18)  SpO2: 94% (02 Oct 2023 05:23) (94% - 98%)    Parameters below as of 02 Oct 2023 05:23  Patient On (Oxygen Delivery Method): room air    I&O's Detail    30 Sep 2023 07:01  -  01 Oct 2023 07:00  --------------------------------------------------------  IN:    dextrose 5% + sodium chloride 0.45% w/ Additives: 1800 mL    IV PiggyBack: 200 mL    IV PiggyBack: 100 mL    IV PiggyBack: 200 mL  Total IN: 2300 mL    OUT:    Voided (mL): 1100 mL  Total OUT: 1100 mL    Total NET: 1200 mL      01 Oct 2023 07:01  -  02 Oct 2023 06:27  --------------------------------------------------------  IN:  Total IN: 0 mL    OUT:    Voided (mL): 900 mL  Total OUT: 900 mL    Total NET: -900 mL      Physical Exam:  General: NAD  Neurological: AO X4  Respiratory: nonlabored respirations  Cardiac: pulse present  Abdominal: soft, mildly diffusely tender with maximum tenderness in LLQ, mildly distended, no rebound, no guarding. Full-length midline laparotomy scar, port scars, Pfannenstiel scar       LABS:  cret                        11.0   5.17  )-----------( 262      ( 02 Oct 2023 06:10 )             35.6     10-01    138  |  104  |  8   ----------------------------<  99  4.1   |  26  |  0.95    Ca    8.9      01 Oct 2023 06:35  Phos  3.2     10-01  Mg     2.3     10-01       Overnight: No acute events.    SUBJECTIVE: Patient seen and examined on AM rounds. Patient states pain and bloating improving. Tolerating bowel prep. NPO for scope today. + Flatus. + clear BMs, clearing up. Voiding appropriately. Ambulating well. Denies fevers, chills, SOB, CP.    Vital Signs Last 24 Hrs  T(C): 36.8 (02 Oct 2023 05:23), Max: 37.1 (01 Oct 2023 12:32)  T(F): 98.3 (02 Oct 2023 05:23), Max: 98.7 (01 Oct 2023 12:32)  HR: 82 (02 Oct 2023 05:23) (81 - 90)  BP: 107/67 (02 Oct 2023 05:23) (105/72 - 137/74)  BP(mean): --  RR: 18 (02 Oct 2023 05:23) (18 - 18)  SpO2: 94% (02 Oct 2023 05:23) (94% - 98%)    Parameters below as of 02 Oct 2023 05:23  Patient On (Oxygen Delivery Method): room air    I&O's Detail    30 Sep 2023 07:01  -  01 Oct 2023 07:00  --------------------------------------------------------  IN:    dextrose 5% + sodium chloride 0.45% w/ Additives: 1800 mL    IV PiggyBack: 200 mL    IV PiggyBack: 100 mL    IV PiggyBack: 200 mL  Total IN: 2300 mL    OUT:    Voided (mL): 1100 mL  Total OUT: 1100 mL    Total NET: 1200 mL      01 Oct 2023 07:01  -  02 Oct 2023 06:58  --------------------------------------------------------  IN:    dextrose 5% + sodium chloride 0.45% w/ Additives: 900 mL    IV PiggyBack: 200 mL  Total IN: 1100 mL    OUT:    Voided (mL): 900 mL  Total OUT: 900 mL    Total NET: 200 mL      Physical Exam:  General: NAD  Neurological: AO X4  Respiratory: nonlabored respirations  Cardiac: pulse present  Abdominal: soft, nontender, mildly distended, no rebound, no guarding. Full-length midline laparotomy scar, port scars, Pfannenstiel scar         LABS:  cret                        11.0   5.17  )-----------( 262      ( 02 Oct 2023 06:10 )             35.6     10-02    139  |  104  |  6<L>  ----------------------------<  99  4.1   |  24  |  1.03    Ca    8.7      02 Oct 2023 06:10  Phos  3.5     10-02  Mg     2.1     10-02

## 2023-10-02 NOTE — CHART NOTE - NSCHARTNOTEFT_GEN_A_CORE
Post Operative Note  Patient: MELODIE ROBLEDO 62y (1960) Female   MRN: [id number]  Location: [facility] 2MON 219 D1  Visit: 09-29-23 Inpatient  Date: 10-02-23 @ 23:49    Procedure: S/P colonoscopy for attempted extraction of foreign body in descending colon    Subjective: Pt feeling well, pain controlled. Endorsing some frustration regarding results of colonoscopy. +OOB, states she would like to resume a diet, denies nausea/vomiting.       Objective:  Vitals: T(F): 98.5 (10-02-23 @ 20:42), Max: 98.5 (10-02-23 @ 20:42)  HR: 83 (10-02-23 @ 20:42)  BP: 150/92 (10-02-23 @ 20:42) (105/72 - 150/92)  RR: 18 (10-02-23 @ 20:42)  SpO2: 100% (10-02-23 @ 20:42)  Vent Settings:     In:   10-01-23 @ 07:01  -  10-02-23 @ 07:00  --------------------------------------------------------  IN: 1100 mL    10-02-23 @ 07:01  -  10-02-23 @ 23:49  --------------------------------------------------------  IN: 1050 mL      IV Fluids: dextrose 5% + sodium chloride 0.45% with potassium chloride 20 mEq/L 1000 milliLiter(s) (75 mL/Hr) IV Continuous <Continuous>  sodium chloride 0.9%. 500 milliLiter(s) (30 mL/Hr) IV Continuous <Continuous>      Out:   10-01-23 @ 07:01  -  10-02-23 @ 07:00  --------------------------------------------------------  OUT: 900 mL    10-02-23 @ 07:01  -  10-02-23 @ 23:49  --------------------------------------------------------  OUT: 600 mL      EBL:     Voided Urine:   10-01-23 @ 07:01  -  10-02-23 @ 07:00  --------------------------------------------------------  OUT: 900 mL    10-02-23 @ 07:01  -  10-02-23 @ 23:49  --------------------------------------------------------  OUT: 600 mL        Physical Examination:  General: NAD  Neurological: AO X4  Respiratory: nonlabored respirations  Cardiac: pulse present  Abdominal: soft, nontender, mildly distended, no rebound, no guarding. Full-length midline laparotomy scar, port scars, Pfannenstiel scar     Imaging:  No post-op imaging studies    Assessment:  62yFemale patient S/P colonoscopy for attempted extraction of foreign body from descending colon.     Plan:  - IV Abx: cipro/flagyl  - F/u repeat CT A/P, foreign body unchanged from prior CT  - No IR role for now  - Pain control PRN  - Diet: CLD  - Activity: OOB  - DVT ppx: Lovenox    Date/Time: 10-02-23 @ 23:49

## 2023-10-03 LAB
APTT BLD: 27 SEC — SIGNIFICANT CHANGE UP (ref 24.5–35.6)
BLD GP AB SCN SERPL QL: NEGATIVE — SIGNIFICANT CHANGE UP
INR BLD: 1 RATIO — SIGNIFICANT CHANGE UP (ref 0.85–1.18)
PROTHROM AB SERPL-ACNC: 11 SEC — SIGNIFICANT CHANGE UP (ref 9.5–13)
RH IG SCN BLD-IMP: POSITIVE — SIGNIFICANT CHANGE UP

## 2023-10-03 PROCEDURE — 74176 CT ABD & PELVIS W/O CONTRAST: CPT | Mod: 26,GC

## 2023-10-03 PROCEDURE — 74018 RADEX ABDOMEN 1 VIEW: CPT | Mod: 26

## 2023-10-03 RX ORDER — ENOXAPARIN SODIUM 100 MG/ML
40 INJECTION SUBCUTANEOUS EVERY 24 HOURS
Refills: 0 | Status: DISCONTINUED | OUTPATIENT
Start: 2023-10-03 | End: 2023-10-06

## 2023-10-03 RX ADMIN — Medication 200 MILLIGRAM(S): at 09:31

## 2023-10-03 RX ADMIN — Medication 100 MILLIGRAM(S): at 13:53

## 2023-10-03 RX ADMIN — Medication 100 MILLIGRAM(S): at 22:36

## 2023-10-03 RX ADMIN — Medication 12.5 MILLIGRAM(S): at 07:04

## 2023-10-03 RX ADMIN — Medication 100 MILLIGRAM(S): at 07:00

## 2023-10-03 RX ADMIN — ENOXAPARIN SODIUM 40 MILLIGRAM(S): 100 INJECTION SUBCUTANEOUS at 07:03

## 2023-10-03 RX ADMIN — PANTOPRAZOLE SODIUM 40 MILLIGRAM(S): 20 TABLET, DELAYED RELEASE ORAL at 07:07

## 2023-10-03 RX ADMIN — Medication 20 MILLIGRAM(S): at 07:04

## 2023-10-03 RX ADMIN — Medication 200 MILLIGRAM(S): at 21:17

## 2023-10-03 RX ADMIN — LOSARTAN POTASSIUM 100 MILLIGRAM(S): 100 TABLET, FILM COATED ORAL at 07:08

## 2023-10-03 RX ADMIN — Medication 20 MILLIGRAM(S): at 17:27

## 2023-10-03 RX ADMIN — DEXTROSE MONOHYDRATE, SODIUM CHLORIDE, AND POTASSIUM CHLORIDE 75 MILLILITER(S): 50; .745; 4.5 INJECTION, SOLUTION INTRAVENOUS at 21:17

## 2023-10-03 RX ADMIN — DEXTROSE MONOHYDRATE, SODIUM CHLORIDE, AND POTASSIUM CHLORIDE 75 MILLILITER(S): 50; .745; 4.5 INJECTION, SOLUTION INTRAVENOUS at 09:31

## 2023-10-03 NOTE — PROGRESS NOTE ADULT - ASSESSMENT
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 She reports the pain started Thursday, accompanied by distention. Passing gas and stool. Of note the patient recently traveled to [facility] in 2023 and ate a large fish. CTAP significant for foreign body penetration, possibly a fishbone, of the descending colon with associated microperforation. Admitted for IV abx. S/p colonoscopy on Monday 10/2 for unsuccessful attempt at extraction of foreign body    Plan  - IV Abx: cipro/flagyl  - F/u repeat CT A/P, foreign body unchanged from prior CT  - No IR role for now  - Pain control PRN  - Diet: CLD, advance as tolerated  - Activity: OOB  - DVT ppx: Lovenox    [facility]  [id number]

## 2023-10-03 NOTE — CHART NOTE - NSCHARTNOTEFT_GEN_A_CORE
IR Consult: Foreign body retrieval   Assessment/Plan: 62F with hx of low-transverse  w/ bilateral tubal ligation, laparoscopic sleeve gastrectomy conversion of sleeve to kyle-en-Y gastric bypass  presents with left sided abdominal pain.  CTAP significant for foreign body penetration, possibly a fishbone, of the descending colon with associated microperforation.     - Case reviewed between Dr. Halaibeh and Dr. Dia this morning.   - Plan to attempt foreign body retrieval today 10/3  - please place IR procedure order under Dr. Halaibeh  - STAT labs in AM (cbc,coags, bmp, T&S)  - Keep pt NPO   - d/w primary team

## 2023-10-03 NOTE — CHART NOTE - NSCHARTNOTEFT_GEN_A_CORE
patient brought down for peritoneal foreign body retrieval, consented.     fluoroscopy and CT show fishbone entirely within lumen of descending colon, no intervention was performed.    continue global management per surgery team.

## 2023-10-03 NOTE — PROGRESS NOTE ADULT - SUBJECTIVE AND OBJECTIVE BOX
SUBJECTIVE: Patient seen and examined on AM rounds. Patient without complaints. Tolerating CLD without nausea or vomiting.  +flatus, +BM. Voiding appropriately. Denies fevers, chills, SOB, CP.     Vital Signs Last 24 Hrs  T(C): 36.9 (02 Oct 2023 20:42), Max: 36.9 (02 Oct 2023 20:42)  T(F): 98.5 (02 Oct 2023 20:42), Max: 98.5 (02 Oct 2023 20:42)  HR: 83 (02 Oct 2023 20:42) (65 - 89)  BP: 150/92 (02 Oct 2023 20:42) (105/72 - 150/92)  BP(mean): --  RR: 18 (02 Oct 2023 20:42) (12 - 20)  SpO2: 100% (02 Oct 2023 20:42) (94% - 100%)    Parameters below as of 02 Oct 2023 20:42  Patient On (Oxygen Delivery Method): room air        I&O's Detail    01 Oct 2023 07:01  -  02 Oct 2023 07:00  --------------------------------------------------------  IN:    dextrose 5% + sodium chloride 0.45% w/ Additives: 900 mL    IV PiggyBack: 200 mL  Total IN: 1100 mL    OUT:    Voided (mL): 900 mL  Total OUT: 900 mL    Total NET: 200 mL      02 Oct 2023 07:01  -  03 Oct 2023 00:16  --------------------------------------------------------  IN:    dextrose 5% + sodium chloride 0.45% w/ Additives: 750 mL    IV PiggyBack: 100 mL    IV PiggyBack: 200 mL  Total IN: 1050 mL    OUT:    Voided (mL): 600 mL  Total OUT: 600 mL    Total NET: 450 mL          Physical Exam:  General: NAD  Neurological: AO X4  Respiratory: nonlabored respirations  Cardiac: pulse present  Abdominal: soft, nontender, mildly distended, no rebound, no guarding. Full-length midline laparotomy scar, port scars, Pfannenstiel scar     LABS:                        11.0   5.17  )-----------( 262      ( 02 Oct 2023 06:10 )             35.6     10-02    139  |  104  |  6<L>  ----------------------------<  99  4.1   |  24  |  1.03    Ca    8.7      02 Oct 2023 06:10  Phos  3.5     10-02  Mg     2.1     10-02        Urinalysis Basic - ( 02 Oct 2023 06:10 )    Color: x / Appearance: x / SG: x / pH: x  Gluc: 99 mg/dL / Ketone: x  / Bili: x / Urobili: x   Blood: x / Protein: x / Nitrite: x   Leuk Esterase: x / RBC: x / WBC x   Sq Epi: x / Non Sq Epi: x / Bacteria: x        RADIOLOGY & ADDITIONAL STUDIES:

## 2023-10-04 DIAGNOSIS — K63.5 POLYP OF COLON: ICD-10-CM

## 2023-10-04 LAB
ANION GAP SERPL CALC-SCNC: 7 MMOL/L — SIGNIFICANT CHANGE UP (ref 5–17)
BUN SERPL-MCNC: <4 MG/DL — LOW (ref 7–23)
CALCIUM SERPL-MCNC: 8.7 MG/DL — SIGNIFICANT CHANGE UP (ref 8.4–10.5)
CHLORIDE SERPL-SCNC: 107 MMOL/L — SIGNIFICANT CHANGE UP (ref 96–108)
CO2 SERPL-SCNC: 25 MMOL/L — SIGNIFICANT CHANGE UP (ref 22–31)
CREAT SERPL-MCNC: 0.95 MG/DL — SIGNIFICANT CHANGE UP (ref 0.5–1.3)
EGFR: 68 ML/MIN/1.73M2 — SIGNIFICANT CHANGE UP
GLUCOSE SERPL-MCNC: 98 MG/DL — SIGNIFICANT CHANGE UP (ref 70–99)
HCT VFR BLD CALC: 36.8 % — SIGNIFICANT CHANGE UP (ref 34.5–45)
HGB BLD-MCNC: 11.6 G/DL — SIGNIFICANT CHANGE UP (ref 11.5–15.5)
MAGNESIUM SERPL-MCNC: 1.8 MG/DL — SIGNIFICANT CHANGE UP (ref 1.6–2.6)
MCHC RBC-ENTMCNC: 29.3 PG — SIGNIFICANT CHANGE UP (ref 27–34)
MCHC RBC-ENTMCNC: 31.5 GM/DL — LOW (ref 32–36)
MCV RBC AUTO: 92.9 FL — SIGNIFICANT CHANGE UP (ref 80–100)
NRBC # BLD: 0 /100 WBCS — SIGNIFICANT CHANGE UP (ref 0–0)
PHOSPHATE SERPL-MCNC: 3.8 MG/DL — SIGNIFICANT CHANGE UP (ref 2.5–4.5)
PLATELET # BLD AUTO: 299 K/UL — SIGNIFICANT CHANGE UP (ref 150–400)
POTASSIUM SERPL-MCNC: 3.7 MMOL/L — SIGNIFICANT CHANGE UP (ref 3.5–5.3)
POTASSIUM SERPL-SCNC: 3.7 MMOL/L — SIGNIFICANT CHANGE UP (ref 3.5–5.3)
RBC # BLD: 3.96 M/UL — SIGNIFICANT CHANGE UP (ref 3.8–5.2)
RBC # FLD: 12.6 % — SIGNIFICANT CHANGE UP (ref 10.3–14.5)
SODIUM SERPL-SCNC: 139 MMOL/L — SIGNIFICANT CHANGE UP (ref 135–145)
WBC # BLD: 4.88 K/UL — SIGNIFICANT CHANGE UP (ref 3.8–10.5)
WBC # FLD AUTO: 4.88 K/UL — SIGNIFICANT CHANGE UP (ref 3.8–10.5)

## 2023-10-04 PROCEDURE — ZZZZZ: CPT

## 2023-10-04 RX ORDER — SOD SULF/SODIUM/NAHCO3/KCL/PEG
4000 SOLUTION, RECONSTITUTED, ORAL ORAL ONCE
Refills: 0 | Status: COMPLETED | OUTPATIENT
Start: 2023-10-04 | End: 2023-10-04

## 2023-10-04 RX ADMIN — Medication 12.5 MILLIGRAM(S): at 05:46

## 2023-10-04 RX ADMIN — AMLODIPINE BESYLATE 10 MILLIGRAM(S): 2.5 TABLET ORAL at 05:46

## 2023-10-04 RX ADMIN — Medication 20 MILLIGRAM(S): at 17:54

## 2023-10-04 RX ADMIN — Medication 200 MILLIGRAM(S): at 09:08

## 2023-10-04 RX ADMIN — ENOXAPARIN SODIUM 40 MILLIGRAM(S): 100 INJECTION SUBCUTANEOUS at 05:45

## 2023-10-04 RX ADMIN — Medication 200 MILLIGRAM(S): at 20:59

## 2023-10-04 RX ADMIN — Medication 4000 MILLILITER(S): at 12:09

## 2023-10-04 RX ADMIN — Medication 5 MILLIGRAM(S): at 21:22

## 2023-10-04 RX ADMIN — DEXTROSE MONOHYDRATE, SODIUM CHLORIDE, AND POTASSIUM CHLORIDE 75 MILLILITER(S): 50; .745; 4.5 INJECTION, SOLUTION INTRAVENOUS at 12:10

## 2023-10-04 RX ADMIN — Medication 100 MILLIGRAM(S): at 21:56

## 2023-10-04 RX ADMIN — PANTOPRAZOLE SODIUM 40 MILLIGRAM(S): 20 TABLET, DELAYED RELEASE ORAL at 05:45

## 2023-10-04 RX ADMIN — Medication 20 MILLIGRAM(S): at 05:45

## 2023-10-04 RX ADMIN — LOSARTAN POTASSIUM 100 MILLIGRAM(S): 100 TABLET, FILM COATED ORAL at 05:46

## 2023-10-04 RX ADMIN — Medication 100 MILLIGRAM(S): at 14:53

## 2023-10-04 RX ADMIN — Medication 100 MILLIGRAM(S): at 05:47

## 2023-10-04 NOTE — PROGRESS NOTE ADULT - ASSESSMENT
62F with hx of low-transverse  w/ bilateral tubal ligation, laparoscopic sleeve gastrectomy by [facility] ([place]), open conversion of sleeve to kyle-en-Y gastric bypass by [facility] (), and an abdominoplasty, who presents with left sided abdominal pain. She reports the pain started Thursday, accompanied by distention. Passing gas and stool. Of note the patient recently traveled to [facility] in 2023 and ate a large fish. CTAP significant for foreign body penetration, possibly a fishbone, of the descending colon with associated microperforation. Admitted for IV abx. S/p colonoscopy on Monday 10/2 for unsuccessful attempt at extraction of foreign body. IR plan to remove foreign body and repeat CT scan on 10/3 shows foreign body within lumen of descending colon.     Plan  - CLD, plan to start colonoscopy prep today  - IVF  - repeat colonoscopy Thursday  - IV Abx: cipro/flagyl  - Pain control PRN  - c/w home meds  - Activity: OOB  - DVT ppx: Lovenox    [facility]  [id number] 62F with hx of low-transverse  w/ bilateral tubal ligation, laparoscopic sleeve gastrectomy by [facility] ([place]), open conversion of sleeve to kyle-en-Y gastric bypass by [facility] (), and an abdominoplasty, who presents with left sided abdominal pain. She reports the pain started Thursday, accompanied by distention. Passing gas and stool. Of note the patient recently traveled to [facility] in 2023 and ate a large fish. CTAP significant for foreign body penetration, possibly a fishbone, of the descending colon with associated microperforation. Admitted for IV abx. S/p colonoscopy on Monday 10/2 for unsuccessful attempt at extraction of foreign body. IR plan to remove foreign body and repeat CT scan on 10/3 shows foreign body within lumen of descending colon.     Plan  - CLD, start bowel prep today  - IVF  - repeat colonoscopy Thursday  - IV Abx: cipro/flagyl  - Pain control PRN  - c/w home meds  - Activity: OOB  - DVT ppx: Lovenox    [facility]  [id number]

## 2023-10-04 NOTE — PROGRESS NOTE ADULT - ATTENDING COMMENTS
I have seen and examined the patient. I agree with the above surgery resident's note.  minimal l-sided abd pain  abd benign  for repeat colo tomorrow to try fb removal- r/b/c explained  re-prep today

## 2023-10-04 NOTE — PRE PROCEDURE NOTE - PRE PROCEDURE EVALUATION
Surgery Pre-Procedure Note    Procedure: Colonoscopy, possible foreign body removal     HPI: 63yo Female with foreign body perforation, possibly a fishbone, of descending colon with associated microperforation.     Allergies:      penicillin (Hives)      Medications: (Abx/Cardiac/Anticoagulation/Blood Products)             LABS:  cret                        11.6   4.88  )-----------( 299      ( 04 Oct 2023 07:01 )             36.8     10-04    139  |  107  |  <4<L>  ----------------------------<  98  3.7   |  25  |  0.95    Ca    8.7      04 Oct 2023 06:58  Phos  3.8     10-04  Mg     1.8     10-04      PT/INR - ( 03 Oct 2023 07:12 )   PT: 11.0 sec;   INR: 1.00 ratio         PTT - ( 03 Oct 2023 07:12 )  PTT:27.0 sec    Exam:  T(F): 97.8, Max: 98.3 (10-04-23)  HR: 76 (72 - 88)  BP: 111/65 (111/65 - 148/98)  RR: 18  SpO2: 100%    General: AO x 3, NAD, Well- Groomed, Well- Nourished.  Lungs: CTA Bilaterally.  Cardiovascular: Regular, S1, S2.  Abdomen:  Soft, ND, mild tenderness    Imaging:      Pertinent Imaging Reviewed    Plan:   - 63yo Female presents for colonoscopy, possible foreign body removal.   - Risks/Benefits/Alternatives explained with the patient and/or healthcare proxy and witnessed informed consent obtained.

## 2023-10-04 NOTE — PROGRESS NOTE ADULT - SUBJECTIVE AND OBJECTIVE BOX
Overnight: No acute events.    SUBJECTIVE:  Patient seen and examined on AM rounds. Patient without complaints. Tolerating CLD without nausea or vomiting.  +flatus, +BM. Voiding appropriately. Denies fevers, chills, SOB, CP.     Vital Signs Last 24 Hrs  T(C): 36.6 (03 Oct 2023 21:21), Max: 36.9 (03 Oct 2023 09:00)  T(F): 97.8 (03 Oct 2023 21:21), Max: 98.4 (03 Oct 2023 09:00)  HR: 78 (03 Oct 2023 21:21) (72 - 81)  BP: 135/91 (03 Oct 2023 21:21) (106/71 - 148/98)  BP(mean): 88 (03 Oct 2023 16:25) (88 - 88)  RR: 18 (03 Oct 2023 21:21) (16 - 18)  SpO2: 100% (03 Oct 2023 21:21) (96% - 100%)    Parameters below as of 03 Oct 2023 21:21  Patient On (Oxygen Delivery Method): room air        I&O's Detail    02 Oct 2023 07:01  -  03 Oct 2023 07:00  --------------------------------------------------------  IN:    dextrose 5% + sodium chloride 0.45% w/ Additives: 1650 mL    IV PiggyBack: 300 mL    IV PiggyBack: 400 mL  Total IN: 2350 mL    OUT:    Voided (mL): 1000 mL  Total OUT: 1000 mL    Total NET: 1350 mL      03 Oct 2023 07:01  -  04 Oct 2023 00:41  --------------------------------------------------------  IN:  Total IN: 0 mL    OUT:    Oral Fluid: 0 mL  Total OUT: 0 mL    Total NET: 0 mL      Physical Exam:  General: NAD  Neurological: AO X4  Respiratory: nonlabored respirations  Cardiac: RRR  Abdominal: soft, nontender, mildly distended, no rebound, no guarding. Full-length midline laparotomy scar, port scars, Pfannenstiel scar     LABS:                        11.0   5.17  )-----------( 262      ( 02 Oct 2023 06:10 )             35.6     10-02    139  |  104  |  6<L>  ----------------------------<  99  4.1   |  24  |  1.03    Ca    8.7      02 Oct 2023 06:10  Phos  3.5     10-02  Mg     2.1     10-02      PT/INR - ( 03 Oct 2023 07:12 )   PT: 11.0 sec;   INR: 1.00 ratio         PTT - ( 03 Oct 2023 07:12 )  PTT:27.0 sec  Urinalysis Basic - ( 02 Oct 2023 06:10 )    Color: x / Appearance: x / SG: x / pH: x  Gluc: 99 mg/dL / Ketone: x  / Bili: x / Urobili: x   Blood: x / Protein: x / Nitrite: x   Leuk Esterase: x / RBC: x / WBC x   Sq Epi: x / Non Sq Epi: x / Bacteria: x        RADIOLOGY & ADDITIONAL STUDIES:   Overnight: No acute events.    SUBJECTIVE:  Patient seen and examined on AM rounds. Patient without complaints. Tolerating CLD without nausea or vomiting.  +flatus, +BM. Voiding appropriately. Denies fevers, chills, SOB, CP.     Vital Signs Last 24 Hrs  T(C): 36.8 (04 Oct 2023 04:32), Max: 36.9 (03 Oct 2023 09:00)  T(F): 98.3 (04 Oct 2023 04:32), Max: 98.4 (03 Oct 2023 09:00)  HR: 86 (04 Oct 2023 04:32) (72 - 86)  BP: 123/79 (04 Oct 2023 04:32) (113/76 - 148/98)  BP(mean): 88 (03 Oct 2023 16:25) (88 - 88)  RR: 18 (04 Oct 2023 04:32) (16 - 18)  SpO2: 96% (04 Oct 2023 04:32) (96% - 100%)    Parameters below as of 04 Oct 2023 04:32  Patient On (Oxygen Delivery Method): room air    I&O's Detail    02 Oct 2023 07:01  -  03 Oct 2023 07:00  --------------------------------------------------------  IN:    dextrose 5% + sodium chloride 0.45% w/ Additives: 1650 mL    IV PiggyBack: 300 mL    IV PiggyBack: 400 mL  Total IN: 2350 mL    OUT:    Voided (mL): 1000 mL  Total OUT: 1000 mL    Total NET: 1350 mL      03 Oct 2023 07:01  -  04 Oct 2023 06:45  --------------------------------------------------------  IN:    IV PiggyBack: 200 mL    IV PiggyBack: 100 mL  Total IN: 300 mL    OUT:    Oral Fluid: 0 mL    Voided (mL): 350 mL  Total OUT: 350 mL    Total NET: -50 mL        Physical Exam:  General: NAD  Neurological: AO X4  Respiratory: nonlabored respirations  Cardiac: RRR  Abdominal: soft, nontender, mildly distended, no rebound, no guarding. Full-length midline laparotomy scar, port scars, Pfannenstiel scar     LABS:                        11.0   5.17  )-----------( 262      ( 02 Oct 2023 06:10 )             35.6     10-02    139  |  104  |  6<L>  ----------------------------<  99  4.1   |  24  |  1.03    Ca    8.7      02 Oct 2023 06:10  Phos  3.5     10-02  Mg     2.1     10-02      PT/INR - ( 03 Oct 2023 07:12 )   PT: 11.0 sec;   INR: 1.00 ratio         PTT - ( 03 Oct 2023 07:12 )  PTT:27.0 sec  Urinalysis Basic - ( 02 Oct 2023 06:10 )    Color: x / Appearance: x / SG: x / pH: x  Gluc: 99 mg/dL / Ketone: x  / Bili: x / Urobili: x   Blood: x / Protein: x / Nitrite: x   Leuk Esterase: x / RBC: x / WBC x   Sq Epi: x / Non Sq Epi: x / Bacteria: x        RADIOLOGY & ADDITIONAL STUDIES:

## 2023-10-05 ENCOUNTER — APPOINTMENT (OUTPATIENT)
Dept: SURGERY | Facility: HOSPITAL | Age: 63
End: 2023-10-05
Payer: COMMERCIAL

## 2023-10-05 LAB
ANION GAP SERPL CALC-SCNC: 14 MMOL/L — SIGNIFICANT CHANGE UP (ref 5–17)
BUN SERPL-MCNC: <4 MG/DL — LOW (ref 7–23)
CALCIUM SERPL-MCNC: 9.1 MG/DL — SIGNIFICANT CHANGE UP (ref 8.4–10.5)
CHLORIDE SERPL-SCNC: 103 MMOL/L — SIGNIFICANT CHANGE UP (ref 96–108)
CO2 SERPL-SCNC: 24 MMOL/L — SIGNIFICANT CHANGE UP (ref 22–31)
CREAT SERPL-MCNC: 1 MG/DL — SIGNIFICANT CHANGE UP (ref 0.5–1.3)
EGFR: 64 ML/MIN/1.73M2 — SIGNIFICANT CHANGE UP
GLUCOSE SERPL-MCNC: 95 MG/DL — SIGNIFICANT CHANGE UP (ref 70–99)
HCT VFR BLD CALC: 35.4 % — SIGNIFICANT CHANGE UP (ref 34.5–45)
HGB BLD-MCNC: 11 G/DL — LOW (ref 11.5–15.5)
MAGNESIUM SERPL-MCNC: 1.8 MG/DL — SIGNIFICANT CHANGE UP (ref 1.6–2.6)
MCHC RBC-ENTMCNC: 28.6 PG — SIGNIFICANT CHANGE UP (ref 27–34)
MCHC RBC-ENTMCNC: 31.1 GM/DL — LOW (ref 32–36)
MCV RBC AUTO: 92.2 FL — SIGNIFICANT CHANGE UP (ref 80–100)
NRBC # BLD: 0 /100 WBCS — SIGNIFICANT CHANGE UP (ref 0–0)
PHOSPHATE SERPL-MCNC: 3.1 MG/DL — SIGNIFICANT CHANGE UP (ref 2.5–4.5)
PLATELET # BLD AUTO: 335 K/UL — SIGNIFICANT CHANGE UP (ref 150–400)
POTASSIUM SERPL-MCNC: 4.1 MMOL/L — SIGNIFICANT CHANGE UP (ref 3.5–5.3)
POTASSIUM SERPL-SCNC: 4.1 MMOL/L — SIGNIFICANT CHANGE UP (ref 3.5–5.3)
RBC # BLD: 3.84 M/UL — SIGNIFICANT CHANGE UP (ref 3.8–5.2)
RBC # FLD: 12.7 % — SIGNIFICANT CHANGE UP (ref 10.3–14.5)
SODIUM SERPL-SCNC: 141 MMOL/L — SIGNIFICANT CHANGE UP (ref 135–145)
SURGICAL PATHOLOGY STUDY: SIGNIFICANT CHANGE UP
WBC # BLD: 5.03 K/UL — SIGNIFICANT CHANGE UP (ref 3.8–10.5)
WBC # FLD AUTO: 5.03 K/UL — SIGNIFICANT CHANGE UP (ref 3.8–10.5)

## 2023-10-05 PROCEDURE — 45379 COLONOSCOPY W/FB REMOVAL: CPT

## 2023-10-05 DEVICE — NET RETRV ROT ROTH 2.5MMX230CM: Type: IMPLANTABLE DEVICE | Status: FUNCTIONAL

## 2023-10-05 RX ORDER — DEXTROSE MONOHYDRATE, SODIUM CHLORIDE, AND POTASSIUM CHLORIDE 50; .745; 4.5 G/1000ML; G/1000ML; G/1000ML
1000 INJECTION, SOLUTION INTRAVENOUS
Refills: 0 | Status: DISCONTINUED | OUTPATIENT
Start: 2023-10-05 | End: 2023-10-05

## 2023-10-05 RX ORDER — MAGNESIUM SULFATE 500 MG/ML
2 VIAL (ML) INJECTION ONCE
Refills: 0 | Status: COMPLETED | OUTPATIENT
Start: 2023-10-05 | End: 2023-10-05

## 2023-10-05 RX ADMIN — Medication 100 MILLIGRAM(S): at 06:15

## 2023-10-05 RX ADMIN — PANTOPRAZOLE SODIUM 40 MILLIGRAM(S): 20 TABLET, DELAYED RELEASE ORAL at 06:18

## 2023-10-05 RX ADMIN — LOSARTAN POTASSIUM 100 MILLIGRAM(S): 100 TABLET, FILM COATED ORAL at 06:33

## 2023-10-05 RX ADMIN — Medication 12.5 MILLIGRAM(S): at 06:16

## 2023-10-05 RX ADMIN — AMLODIPINE BESYLATE 10 MILLIGRAM(S): 2.5 TABLET ORAL at 06:33

## 2023-10-05 RX ADMIN — Medication 25 GRAM(S): at 08:21

## 2023-10-05 RX ADMIN — DEXTROSE MONOHYDRATE, SODIUM CHLORIDE, AND POTASSIUM CHLORIDE 50 MILLILITER(S): 50; .745; 4.5 INJECTION, SOLUTION INTRAVENOUS at 18:37

## 2023-10-05 RX ADMIN — Medication 20 MILLIGRAM(S): at 17:53

## 2023-10-05 RX ADMIN — Medication 100 MILLIGRAM(S): at 22:20

## 2023-10-05 RX ADMIN — Medication 200 MILLIGRAM(S): at 08:21

## 2023-10-05 RX ADMIN — Medication 200 MILLIGRAM(S): at 20:06

## 2023-10-05 RX ADMIN — ENOXAPARIN SODIUM 40 MILLIGRAM(S): 100 INJECTION SUBCUTANEOUS at 06:15

## 2023-10-05 RX ADMIN — Medication 20 MILLIGRAM(S): at 06:15

## 2023-10-05 RX ADMIN — DEXTROSE MONOHYDRATE, SODIUM CHLORIDE, AND POTASSIUM CHLORIDE 120 MILLILITER(S): 50; .745; 4.5 INJECTION, SOLUTION INTRAVENOUS at 08:18

## 2023-10-05 RX ADMIN — Medication 100 MILLIGRAM(S): at 17:49

## 2023-10-05 NOTE — PRE-ANESTHESIA EVALUATION ADULT - NSANTHNECKRD_ENT_A_CORE
[facility] ENCOUNTER  URGENT CARE OFFICE VISIT    Chief Complaint   Patient presents with   • Cough     Mom reports on Thursday night - started with barky cough, scratchy throat, hx of croup  - last 1 1/2 years ago.        SUBJECTIVE:  Mariano Roe is a 7 year old female who presented requesting evaluation for worsening URI symptoms for 2 days including barking cough, nasal congestion, rhinorrhea. No fever. No vomiting or diarrhea.  Appetite has been less than usual.  No chills, sore throat, hoarseness of voice, difficulty breathing.  No recent exposure to COVID-19.     REVIEW OF SYSTEMS:    Constitutional: No malaise, lethargy or weight loss.  Skin: No rash  ENT: No ear pain, or sore throat  Cardiovascular: No chest pain  Respiratory: No shortness of breath, or wheezing.  Gastrointestinal: No abdominal pain, nausea, vomiting, or diarrhea    PAST HISTORIES:    ALLERGIES:  No Known Allergies    MEDICATIONS:  Current Outpatient Medications   Medication Sig Dispense Refill   • ibuprofen (CHILDRENS ADVIL) 100 MG/5ML suspension TAKE 20 ML BY MOUTH EVERY 6 HOURS AS NEEDED FOR FEVER AND PAIN FOR UP TO 14 DAYS     • cetirizine (ZyrTEC) 10 MG tablet Take 1 tablet by mouth daily as needed for Allergies. Take 1 tablet by mouth daily as needed  for allergies  0   • fluticasone (FLONASE) 50 MCG/ACT nasal spray Spray 1 spray in each nostril daily. Spray 2 sprays in each nostril daily 16 g 0   • guaifenesin 100 MG/5ML Take 5 mLs by mouth every 6 hours as needed (Cough/mucous).     • Acetaminophen 325 MG/10.15ML Solution Take 320 mg by mouth.     • Multiple Vitamins-Minerals (MULTI-VITAMIN GUMMIES PO) Take 1 tablet by mouth daily.       Current Facility-Administered Medications   Medication Dose Route Frequency Provider Last Rate Last Admin   • dexamethasone 1 MG/ML solution 7 mg  7 mg Oral Once Jose Andrea MD            No past medical history on file.  No past surgical history on file.     Social History     Tobacco 
Use   Smoking Status Not on file   Smokeless Tobacco Not on file     Social History     Substance and Sexual Activity   Alcohol Use None     No family history on file.    OBJECTIVE:  PHYSICAL EXAM:    Visit Vitals  BP (!) 126/64   Pulse 94   Temp 98.2 °F (36.8 °C) (Tympanic)   Resp (!) 16   Wt (!) 51.4 kg (113 lb 3.3 oz)   SpO2 100%      CONSTITUTIONAL: Well-hydrated, well-nourished female who appears to be in no acute distress.  HEENT: External ears without deformities. TMs are clear bilaterally. Nose without deformities. There is moist, hyperemic nasal mucosa.  Clear nasal drainage. Throat: mild hyperemia, moist mucous membranes.   NECK: No lymphadenopathy or thyroid enlargement. .  LUNGS: Clear to auscultation bilaterally. No wheezes, rales or rhonchi noted.   CARDIOVASCULAR: Regular rate and rhythm with normal S1 and S2. There are no murmurs auscultated.   ABDOMEN: Soft and non-tender. No abnormal mass. No liver or spleen enlargement. Bowel sounds normal.   SKIN: Warm, dry, intact without rash or lesion.  NEUROLOGIC: Alert and oriented    PSYCHIATRIC: Normal mood and affect.      ASSESSMENT:    1. Viral croup      PLAN:    Orders Placed This Encounter   • dexamethasone 1 MG/ML solution 7 mg      PATIENT INSTRUCTIONS:      1) Symptomatic treatment with fluids, humidification, acetaminophen.  2) Alternate Tylenol with Motrin every four hours as needed for pain and fever.  3) Recheck if symptoms persist, worsen, or new symptoms develop.    FOLLOW-UP:      The patient was advised to follow up with primary physician in 2 - 3 days. Recheck with the urgent care clinic sooner if symptoms get worse or if new symptoms appear.    The mother indicated understanding of the diagnosis and agreed with the plan of care.    Jose Andrea MD       
> 16 inches

## 2023-10-05 NOTE — PROGRESS NOTE ADULT - ASSESSMENT
62F with hx of low-transverse  w/ bilateral tubal ligation, laparoscopic sleeve gastrectomy by [facility] ([place]), open conversion of sleeve to kyle-en-Y gastric bypass by [facility] (), and an abdominoplasty, who presents with left sided abdominal pain. She reports the pain started Thursday, accompanied by distention. Passing gas and stool. Of note the patient recently traveled to [facility] in 2023 and ate a large fish. CTAP significant for foreign body penetration, possibly a fishbone, of the descending colon with associated microperforation. Admitted for IV abx. S/p colonoscopy on Monday 10/2 for unsuccessful attempt at extraction of foreign body. IR plan to remove foreign body and repeat CT scan on 10/3 shows foreign body within lumen of descending colon.     Plan  - NPO since midnight  - IVF  - repeat colonoscopy today  - IV Abx: cipro/flagyl  - Pain control PRN  - c/w home meds  - Activity: OOB  - DVT ppx: Lovenox    [facility]  [id number]

## 2023-10-05 NOTE — PRE-ANESTHESIA EVALUATION ADULT - NSANTHOSAYNRD_GEN_A_CORE
No. BRAULIO screening performed.  STOP BANG Legend: 0-2 = LOW Risk; 3-4 = INTERMEDIATE Risk; 5-8 = HIGH Risk

## 2023-10-05 NOTE — PROGRESS NOTE ADULT - ATTENDING COMMENTS
I have seen and examined the patient. I agree with the above surgery resident's note.  repeat colo today to ry and locate/remove FB  r/b/c explained

## 2023-10-05 NOTE — PRE PROCEDURE NOTE - PRE PROCEDURE EVALUATION
Attending Physician: Dr. Lopez                     Procedure: Colonoscopy     Indication for Procedure: FB removal    ________________________________________________________  PAST MEDICAL & SURGICAL HISTORY:  Morbidly Obese      HTN (hypertension)      GERD (gastroesophageal reflux disease)       History  age 39      Histoty of Tubal Ligation      Bariatric surgery status  s/p gastric sleeve        ALLERGIES:  penicillin (Hives)  latex (Rash)    HOME MEDICATIONS:  amLODIPine 10 mg oral tablet: 1 tab(s) orally once a day  busPIRone 10 mg oral tablet: 2 tab(s) orally 2 times a day  chlorthalidone 25 mg oral tablet: 0.5 tab(s) orally once a day  losartan 100 mg oral tablet: 1 tab(s) orally once a day  melatonin 5 mg oral tablet: 1 tab(s) orally once a day (at bedtime) as needed for  insomnia  mirtazapine 15 mg oral tablet: 1 tab(s) orally once a day (at bedtime) as needed for AMS  pantoprazole 40 mg oral delayed release tablet: 1 tab(s) orally once a day  Wegovy (1 mg dose) subcutaneous solution: 1 milligram(s) subcutaneously once a week    AICD/PPM: [ ] yes   [x] no    PERTINENT LAB DATA:                        11.0   5.03  )-----------( 335      ( 05 Oct 2023 07:13 )             35.4     10-05    141  |  103  |  <4<L>  ----------------------------<  95  4.1   |  24  |  1.00    Ca    9.1      05 Oct 2023 07:13  Phos  3.1     10-05  Mg     1.8     10-05                  PHYSICAL EXAMINATION:    T(C): 36.7  HR: 71  BP: 134/80  RR: 12  SpO2: 100%    Constitutional: NAD    Neck:  No JVD  Respiratory: CTAB/L  Cardiovascular: S1 and S2  Gastrointestinal: BS+, soft, NT/ND  Extremities: No peripheral edema  Neurological: A/O x 3, no focal deficits    ASA Classification: per Anesthesia    COMMENTS:    The patient is a suitable candidate for the planned procedure unless box checked [ ]  No, explain:

## 2023-10-05 NOTE — CHART NOTE - NSCHARTNOTEFT_GEN_A_CORE
Post Operative Check    Patient is post op from a colonoscopy and is recovering well.     Vitals    T(C): 36.3 (10-05-23 @ 17:45), Max: 37.1 (10-04-23 @ 20:37)  HR: 76 (10-05-23 @ 17:45) (58 - 99)  BP: 126/82 (10-05-23 @ 17:45) (104/60 - 134/80)  RR: 18 (10-05-23 @ 17:45) (12 - 18)  SpO2: 100% (10-05-23 @ 17:45) (93% - 100%)      10-04 @ 07:01  -  10-05 @ 07:00  --------------------------------------------------------  IN:    dextrose 5% + sodium chloride 0.45% w/ Additives: 720 mL    dextrose 5% + sodium chloride 0.45% w/ Additives: 1350 mL    IV PiggyBack: 100 mL    IV PiggyBack: 400 mL    IV PiggyBack: 200 mL    Oral Fluid: 740 mL  Total IN: 3510 mL    OUT:  Total OUT: 0 mL    Total NET: 3510 mL      10-05 @ 07:01  -  10-05 @ 19:57  --------------------------------------------------------  IN:  Total IN: 0 mL    OUT:    Oral Fluid: 0 mL  Total OUT: 0 mL    Total NET: 0 mL          Labs                        11.0   5.03  )-----------( 335      ( 05 Oct 2023 07:13 )             35.4       CBC Full  -  ( 05 Oct 2023 07:13 )  WBC Count : 5.03 K/uL  Hemoglobin : 11.0 g/dL  Hematocrit : 35.4 %  Platelet Count - Automated : 335 K/uL  Mean Cell Volume : 92.2 fl  Mean Cell Hemoglobin : 28.6 pg  Mean Cell Hemoglobin Concentration : 31.1 gm/dL  Auto Neutrophil # : x  Auto Lymphocyte # : x  Auto Monocyte # : x  Auto Eosinophil # : x  Auto Basophil # : x  Auto Neutrophil % : x  Auto Lymphocyte % : x  Auto Monocyte % : x  Auto Eosinophil % : x  Auto Basophil % : x      Physical Exam  General:   Abdomen:      Patient is a 62y old Female s/p Post Operative Check    Patient is post op from a colonoscopy and is recovering well. Denies any nausea, vomiting, abdominal pain. Tolerated reg diet. Drinking water. IV locked. Voiding. BM since colonoscopy (diarrhea)    Vitals    T(C): 36.3 (10-05-23 @ 17:45), Max: 37.1 (10-04-23 @ 20:37)  HR: 76 (10-05-23 @ 17:45) (58 - 99)  BP: 126/82 (10-05-23 @ 17:45) (104/60 - 134/80)  RR: 18 (10-05-23 @ 17:45) (12 - 18)  SpO2: 100% (10-05-23 @ 17:45) (93% - 100%)      10-04 @ 07:01  -  10-05 @ 07:00  --------------------------------------------------------  IN:    dextrose 5% + sodium chloride 0.45% w/ Additives: 720 mL    dextrose 5% + sodium chloride 0.45% w/ Additives: 1350 mL    IV PiggyBack: 100 mL    IV PiggyBack: 400 mL    IV PiggyBack: 200 mL    Oral Fluid: 740 mL  Total IN: 3510 mL    OUT:  Total OUT: 0 mL    Total NET: 3510 mL      10-05 @ 07:01  -  10-05 @ 19:57  --------------------------------------------------------  IN:  Total IN: 0 mL    OUT:    Oral Fluid: 0 mL  Total OUT: 0 mL    Total NET: 0 mL          Labs                        11.0   5.03  )-----------( 335      ( 05 Oct 2023 07:13 )             35.4       CBC Full  -  ( 05 Oct 2023 07:13 )  WBC Count : 5.03 K/uL  Hemoglobin : 11.0 g/dL  Hematocrit : 35.4 %  Platelet Count - Automated : 335 K/uL  Mean Cell Volume : 92.2 fl  Mean Cell Hemoglobin : 28.6 pg  Mean Cell Hemoglobin Concentration : 31.1 gm/dL  Auto Neutrophil # : x  Auto Lymphocyte # : x  Auto Monocyte # : x  Auto Eosinophil # : x  Auto Basophil # : x  Auto Neutrophil % : x  Auto Lymphocyte % : x  Auto Monocyte % : x  Auto Eosinophil % : x  Auto Basophil % : x      Physical Exam  General:   Abdomen:      Patient is a 62y old Female s/p Post Operative Check    Patient is post op from a colonoscopy and is recovering well. Denies any nausea, vomiting, abdominal pain. Tolerated reg diet. Drinking water. IV locked. Voiding. BM since colonoscopy (diarrhea)    Vitals    T(C): 36.3 (10-05-23 @ 17:45), Max: 37.1 (10-04-23 @ 20:37)  HR: 76 (10-05-23 @ 17:45) (58 - 99)  BP: 126/82 (10-05-23 @ 17:45) (104/60 - 134/80)  RR: 18 (10-05-23 @ 17:45) (12 - 18)  SpO2: 100% (10-05-23 @ 17:45) (93% - 100%)      10-04 @ 07:01  -  10-05 @ 07:00  --------------------------------------------------------  IN:    dextrose 5% + sodium chloride 0.45% w/ Additives: 720 mL    dextrose 5% + sodium chloride 0.45% w/ Additives: 1350 mL    IV PiggyBack: 100 mL    IV PiggyBack: 400 mL    IV PiggyBack: 200 mL    Oral Fluid: 740 mL  Total IN: 3510 mL    OUT:  Total OUT: 0 mL    Total NET: 3510 mL      10-05 @ 07:01  -  10-05 @ 19:57  --------------------------------------------------------  IN:  Total IN: 0 mL    OUT:    Oral Fluid: 0 mL  Total OUT: 0 mL    Total NET: 0 mL          Labs                        11.0   5.03  )-----------( 335      ( 05 Oct 2023 07:13 )             35.4       CBC Full  -  ( 05 Oct 2023 07:13 )  WBC Count : 5.03 K/uL  Hemoglobin : 11.0 g/dL  Hematocrit : 35.4 %  Platelet Count - Automated : 335 K/uL  Mean Cell Volume : 92.2 fl  Mean Cell Hemoglobin : 28.6 pg  Mean Cell Hemoglobin Concentration : 31.1 gm/dL  Auto Neutrophil # : x  Auto Lymphocyte # : x  Auto Monocyte # : x  Auto Eosinophil # : x  Auto Basophil # : x  Auto Neutrophil % : x  Auto Lymphocyte % : x  Auto Monocyte % : x  Auto Eosinophil % : x  Auto Basophil % : x      Physical Exam  General: NAD  Neurological: AO X4  Respiratory: nonlabored respirations  Cardiac: RRR  Abdominal: soft, nontender, nondistended, no rebound, no guarding. Full-length midline laparotomy scar, port scars, Pfannenstiel scar     Patient is a 62y old Female s/p colonoscopy.    Plan  - reg diet (phase 3 sidney)  - c/w home meds  - c/w IV abx while inpatient  - Lovenox  - d/c in AM on abx    [facility]  [id number]

## 2023-10-05 NOTE — PRE-ANESTHESIA EVALUATION ADULT - NSPREOPDXFT_GEN_ALL_CORE
Colon Perforation by [facility]
irwin in desc colon
THAD in colon
foreign body penetration of the descending colon

## 2023-10-05 NOTE — PROGRESS NOTE ADULT - SUBJECTIVE AND OBJECTIVE BOX
Overnight: Patient completing prep for colonoscopy. NPO at midnight.    SUBJECTIVE: Patient seen and examined on AM rounds. Patient without complaints. Denies nausea or vomiting.  +flatus, +BM (clear). Voiding appropriately. Denies fevers, chills, SOB, CP.       Vital Signs Last 24 Hrs  T(C): 37.1 (04 Oct 2023 20:37), Max: 37.1 (04 Oct 2023 20:37)  T(F): 98.7 (04 Oct 2023 20:37), Max: 98.7 (04 Oct 2023 20:37)  HR: 76 (04 Oct 2023 20:37) (76 - 88)  BP: 124/78 (04 Oct 2023 20:37) (111/65 - 135/93)  BP(mean): --  RR: 18 (04 Oct 2023 20:37) (18 - 18)  SpO2: 100% (04 Oct 2023 20:37) (96% - 100%)    Parameters below as of 04 Oct 2023 20:37  Patient On (Oxygen Delivery Method): room air        I&O's Detail    03 Oct 2023 07:01  -  04 Oct 2023 07:00  --------------------------------------------------------  IN:    dextrose 5% + sodium chloride 0.45% w/ Additives: 900 mL    IV PiggyBack: 200 mL    IV PiggyBack: 100 mL  Total IN: 1200 mL    OUT:    Oral Fluid: 0 mL    Voided (mL): 750 mL  Total OUT: 750 mL    Total NET: 450 mL      04 Oct 2023 07:01  -  05 Oct 2023 00:50  --------------------------------------------------------  IN:    dextrose 5% + sodium chloride 0.45% w/ Additives: 900 mL    IV PiggyBack: 100 mL    IV PiggyBack: 200 mL    Oral Fluid: 740 mL  Total IN: 1940 mL    OUT:  Total OUT: 0 mL    Total NET: 1940 mL      Physical Exam:  General: NAD  Neurological: AO X4  Respiratory: nonlabored respirations  Cardiac: RRR  Abdominal: soft, nontender, mildly distended, no rebound, no guarding. Full-length midline laparotomy scar, port scars, Pfannenstiel scar       LABS:                        11.6   4.88  )-----------( 299      ( 04 Oct 2023 07:01 )             36.8     10-04    139  |  107  |  <4<L>  ----------------------------<  98  3.7   |  25  |  0.95    Ca    8.7      04 Oct 2023 06:58  Phos  3.8     10-04  Mg     1.8     10-04      PT/INR - ( 03 Oct 2023 07:12 )   PT: 11.0 sec;   INR: 1.00 ratio         PTT - ( 03 Oct 2023 07:12 )  PTT:27.0 sec  Urinalysis Basic - ( 04 Oct 2023 06:58 )    Color: x / Appearance: x / SG: x / pH: x  Gluc: 98 mg/dL / Ketone: x  / Bili: x / Urobili: x   Blood: x / Protein: x / Nitrite: x   Leuk Esterase: x / RBC: x / WBC x   Sq Epi: x / Non Sq Epi: x / Bacteria: x        RADIOLOGY & ADDITIONAL STUDIES:   Overnight: Patient completing prep for colonoscopy. NPO at midnight.    SUBJECTIVE: Patient seen and examined on AM rounds. Patient without complaints, pain/bloating continuing to improve.  Denies nausea or vomiting.  +flatus, +BM (clear). Voiding appropriately. Denies fevers, chills, SOB, CP.       Vital Signs Last 24 Hrs  T(C): 37.1 (04 Oct 2023 20:37), Max: 37.1 (04 Oct 2023 20:37)  T(F): 98.7 (04 Oct 2023 20:37), Max: 98.7 (04 Oct 2023 20:37)  HR: 76 (04 Oct 2023 20:37) (76 - 88)  BP: 124/78 (04 Oct 2023 20:37) (111/65 - 135/93)  BP(mean): --  RR: 18 (04 Oct 2023 20:37) (18 - 18)  SpO2: 100% (04 Oct 2023 20:37) (96% - 100%)    Parameters below as of 04 Oct 2023 20:37  Patient On (Oxygen Delivery Method): room air        I&O's Detail    03 Oct 2023 07:01  -  04 Oct 2023 07:00  --------------------------------------------------------  IN:    dextrose 5% + sodium chloride 0.45% w/ Additives: 900 mL    IV PiggyBack: 200 mL    IV PiggyBack: 100 mL  Total IN: 1200 mL    OUT:    Oral Fluid: 0 mL    Voided (mL): 750 mL  Total OUT: 750 mL    Total NET: 450 mL      04 Oct 2023 07:01  -  05 Oct 2023 00:50  --------------------------------------------------------  IN:    dextrose 5% + sodium chloride 0.45% w/ Additives: 900 mL    IV PiggyBack: 100 mL    IV PiggyBack: 200 mL    Oral Fluid: 740 mL  Total IN: 1940 mL    OUT:  Total OUT: 0 mL    Total NET: 1940 mL      Physical Exam:  General: NAD  Neurological: AO X4  Respiratory: nonlabored respirations  Cardiac: RRR  Abdominal: soft, nontender, mildly distended, no rebound, no guarding. Full-length midline laparotomy scar, port scars, Pfannenstiel scar       LABS:                        11.6   4.88  )-----------( 299      ( 04 Oct 2023 07:01 )             36.8     10-04    139  |  107  |  <4<L>  ----------------------------<  98  3.7   |  25  |  0.95    Ca    8.7      04 Oct 2023 06:58  Phos  3.8     10-04  Mg     1.8     10-04      PT/INR - ( 03 Oct 2023 07:12 )   PT: 11.0 sec;   INR: 1.00 ratio         PTT - ( 03 Oct 2023 07:12 )  PTT:27.0 sec  Urinalysis Basic - ( 04 Oct 2023 06:58 )    Color: x / Appearance: x / SG: x / pH: x  Gluc: 98 mg/dL / Ketone: x  / Bili: x / Urobili: x   Blood: x / Protein: x / Nitrite: x   Leuk Esterase: x / RBC: x / WBC x   Sq Epi: x / Non Sq Epi: x / Bacteria: x        RADIOLOGY & ADDITIONAL STUDIES:

## 2023-10-06 ENCOUNTER — TRANSCRIPTION ENCOUNTER (OUTPATIENT)
Age: 63
End: 2023-10-06

## 2023-10-06 VITALS
TEMPERATURE: 98 F | OXYGEN SATURATION: 96 % | RESPIRATION RATE: 18 BRPM | SYSTOLIC BLOOD PRESSURE: 127 MMHG | DIASTOLIC BLOOD PRESSURE: 71 MMHG | HEART RATE: 82 BPM

## 2023-10-06 LAB
ANION GAP SERPL CALC-SCNC: 9 MMOL/L — SIGNIFICANT CHANGE UP (ref 5–17)
BUN SERPL-MCNC: 8 MG/DL — SIGNIFICANT CHANGE UP (ref 7–23)
CALCIUM SERPL-MCNC: 9 MG/DL — SIGNIFICANT CHANGE UP (ref 8.4–10.5)
CHLORIDE SERPL-SCNC: 105 MMOL/L — SIGNIFICANT CHANGE UP (ref 96–108)
CO2 SERPL-SCNC: 24 MMOL/L — SIGNIFICANT CHANGE UP (ref 22–31)
CREAT SERPL-MCNC: 1.12 MG/DL — SIGNIFICANT CHANGE UP (ref 0.5–1.3)
EGFR: 56 ML/MIN/1.73M2 — LOW
GLUCOSE SERPL-MCNC: 149 MG/DL — HIGH (ref 70–99)
HCT VFR BLD CALC: 39.9 % — SIGNIFICANT CHANGE UP (ref 34.5–45)
HGB BLD-MCNC: 12.3 G/DL — SIGNIFICANT CHANGE UP (ref 11.5–15.5)
MAGNESIUM SERPL-MCNC: 2.1 MG/DL — SIGNIFICANT CHANGE UP (ref 1.6–2.6)
MCHC RBC-ENTMCNC: 28.8 PG — SIGNIFICANT CHANGE UP (ref 27–34)
MCHC RBC-ENTMCNC: 30.8 GM/DL — LOW (ref 32–36)
MCV RBC AUTO: 93.4 FL — SIGNIFICANT CHANGE UP (ref 80–100)
NRBC # BLD: 0 /100 WBCS — SIGNIFICANT CHANGE UP (ref 0–0)
PHOSPHATE SERPL-MCNC: 2.5 MG/DL — SIGNIFICANT CHANGE UP (ref 2.5–4.5)
PLATELET # BLD AUTO: 369 K/UL — SIGNIFICANT CHANGE UP (ref 150–400)
POTASSIUM SERPL-MCNC: 3.7 MMOL/L — SIGNIFICANT CHANGE UP (ref 3.5–5.3)
POTASSIUM SERPL-SCNC: 3.7 MMOL/L — SIGNIFICANT CHANGE UP (ref 3.5–5.3)
RBC # BLD: 4.27 M/UL — SIGNIFICANT CHANGE UP (ref 3.8–5.2)
RBC # FLD: 12.9 % — SIGNIFICANT CHANGE UP (ref 10.3–14.5)
SODIUM SERPL-SCNC: 138 MMOL/L — SIGNIFICANT CHANGE UP (ref 135–145)
WBC # BLD: 6.8 K/UL — SIGNIFICANT CHANGE UP (ref 3.8–10.5)
WBC # FLD AUTO: 6.8 K/UL — SIGNIFICANT CHANGE UP (ref 3.8–10.5)

## 2023-10-06 PROCEDURE — 74176 CT ABD & PELVIS W/O CONTRAST: CPT

## 2023-10-06 PROCEDURE — 84295 ASSAY OF SERUM SODIUM: CPT

## 2023-10-06 PROCEDURE — 82330 ASSAY OF CALCIUM: CPT

## 2023-10-06 PROCEDURE — 83690 ASSAY OF LIPASE: CPT

## 2023-10-06 PROCEDURE — 85610 PROTHROMBIN TIME: CPT

## 2023-10-06 PROCEDURE — 88305 TISSUE EXAM BY PATHOLOGIST: CPT

## 2023-10-06 PROCEDURE — 36415 COLL VENOUS BLD VENIPUNCTURE: CPT

## 2023-10-06 PROCEDURE — 74177 CT ABD & PELVIS W/CONTRAST: CPT

## 2023-10-06 PROCEDURE — 83735 ASSAY OF MAGNESIUM: CPT

## 2023-10-06 PROCEDURE — 83605 ASSAY OF LACTIC ACID: CPT

## 2023-10-06 PROCEDURE — 84132 ASSAY OF SERUM POTASSIUM: CPT

## 2023-10-06 PROCEDURE — 86901 BLOOD TYPING SEROLOGIC RH(D): CPT

## 2023-10-06 PROCEDURE — 85027 COMPLETE CBC AUTOMATED: CPT

## 2023-10-06 PROCEDURE — 86900 BLOOD TYPING SEROLOGIC ABO: CPT

## 2023-10-06 PROCEDURE — 86850 RBC ANTIBODY SCREEN: CPT

## 2023-10-06 PROCEDURE — 86803 HEPATITIS C AB TEST: CPT

## 2023-10-06 PROCEDURE — 96375 TX/PRO/DX INJ NEW DRUG ADDON: CPT

## 2023-10-06 PROCEDURE — 80048 BASIC METABOLIC PNL TOTAL CA: CPT

## 2023-10-06 PROCEDURE — 74018 RADEX ABDOMEN 1 VIEW: CPT

## 2023-10-06 PROCEDURE — 85014 HEMATOCRIT: CPT

## 2023-10-06 PROCEDURE — 85018 HEMOGLOBIN: CPT

## 2023-10-06 PROCEDURE — 84100 ASSAY OF PHOSPHORUS: CPT

## 2023-10-06 PROCEDURE — 82803 BLOOD GASES ANY COMBINATION: CPT

## 2023-10-06 PROCEDURE — 80053 COMPREHEN METABOLIC PANEL: CPT

## 2023-10-06 PROCEDURE — 82947 ASSAY GLUCOSE BLOOD QUANT: CPT

## 2023-10-06 PROCEDURE — 85025 COMPLETE CBC W/AUTO DIFF WBC: CPT

## 2023-10-06 PROCEDURE — 81001 URINALYSIS AUTO W/SCOPE: CPT

## 2023-10-06 PROCEDURE — ZZZZZ: CPT

## 2023-10-06 PROCEDURE — 96374 THER/PROPH/DIAG INJ IV PUSH: CPT

## 2023-10-06 PROCEDURE — 82435 ASSAY OF BLOOD CHLORIDE: CPT

## 2023-10-06 PROCEDURE — 85730 THROMBOPLASTIN TIME PARTIAL: CPT

## 2023-10-06 PROCEDURE — 99285 EMERGENCY DEPT VISIT HI MDM: CPT | Mod: 25

## 2023-10-06 RX ADMIN — PANTOPRAZOLE SODIUM 40 MILLIGRAM(S): 20 TABLET, DELAYED RELEASE ORAL at 06:04

## 2023-10-06 RX ADMIN — Medication 12.5 MILLIGRAM(S): at 06:07

## 2023-10-06 RX ADMIN — LOSARTAN POTASSIUM 100 MILLIGRAM(S): 100 TABLET, FILM COATED ORAL at 06:04

## 2023-10-06 RX ADMIN — AMLODIPINE BESYLATE 10 MILLIGRAM(S): 2.5 TABLET ORAL at 06:04

## 2023-10-06 RX ADMIN — Medication 20 MILLIGRAM(S): at 06:03

## 2023-10-06 RX ADMIN — Medication 200 MILLIGRAM(S): at 10:45

## 2023-10-06 RX ADMIN — Medication 100 MILLIGRAM(S): at 06:07

## 2023-10-06 RX ADMIN — ENOXAPARIN SODIUM 40 MILLIGRAM(S): 100 INJECTION SUBCUTANEOUS at 06:04

## 2023-10-06 NOTE — PROGRESS NOTE ADULT - REASON FOR ADMISSION
Perforated colon

## 2023-10-06 NOTE — PROGRESS NOTE ADULT - ASSESSMENT
62F with hx of low-transverse  w/ bilateral tubal ligation, laparoscopic sleeve gastrectomy by [facility] ([place]), open conversion of sleeve to kyle-en-Y gastric bypass by [facility] (), and an abdominoplasty, who presents with left sided abdominal pain. She reports the pain started Thursday, accompanied by distention. Passing gas and stool. Of note the patient recently traveled to [facility] in 2023 and ate a large fish. CTAP significant for foreign body penetration, possibly a fishbone, of the descending colon with associated microperforation. Admitted for IV abx. S/p colonoscopy on Monday 10/2 for unsuccessful attempt at extraction of foreign body. IR plan to remove foreign body and repeat CT scan on 10/3 shows foreign body within lumen of descending colon. Repeat colonoscopy without foreign body retrieval.    Plan  - reg diet, phase 3 sidney  - IV Abx: cipro/flagyl  - Pain control PRN  - c/w home meds  - Activity: OOB  - DVT ppx: Lovenox  - dispo: home today    [facility]  [id number]     62F with hx of low-transverse  w/ bilateral tubal ligation, laparoscopic sleeve gastrectomy by [facility] ([place]), open conversion of sleeve to kyle-en-Y gastric bypass by [facility] (), and an abdominoplasty, who presents with left sided abdominal pain. She reports the pain started Thursday, accompanied by distention. Passing gas and stool. Of note the patient recently traveled to [facility] in 2023 and ate a large fish. CTAP significant for foreign body penetration, possibly a fishbone, of the descending colon with associated microperforation. Admitted for IV abx. S/p colonoscopy on Monday 10/2 for unsuccessful attempt at extraction of foreign body. IR plan to remove foreign body and repeat CT scan on 10/3 shows foreign body within lumen of descending colon. Repeat colonoscopy without foreign body retrieval.    Plan  - reg diet, phase 3 sidney  - IV Abx: cipro/flagyl  - Pain control PRN  - c/w home meds  - Activity: OOB  - DVT ppx: Lovenox  - dispo: home today w/ abx, f/u in 1 week after interval CT    [facility]  [id number]

## 2023-10-06 NOTE — DISCHARGE NOTE PROVIDER - HOSPITAL COURSE
62F with hx of low-transverse  w/ bilateral tubal ligation, laparoscopic sleeve gastrectomy by [facility] ([place]), open conversion of sleeve to kyle-en-Y gastric bypass by [facility] (), and an abdominoplasty, who presents with left sided abdominal pain. She reports the pain started yesterday, accompanied by distention. Passing gas and stool. Of note the patient recently traveled to [facility] in 2023 and ate a large fish.     In ED, afebrile WBC 9 and lactate 1.1  soft / mild diffuse tenderness / mildly distended / tympanitic. Patient started on antibiotics and made NPO.    On 10/2 patient underwent S/P colonoscopy for attempted extraction of foreign body in descending colon. No foreign body was seen on CT.     IR consulted as well for removal patient brought down for peritoneal foreign body retrieval, consented.  fluoroscopy and CT show fishbone entirely within lumen of descending colon, no intervention was performed.    10/6 re-attempt at colonoscopy was done. no fish- bone seen. Diet was advanced as tolerated. Patient sent home on oral antibiotics. to see Dr. Dia in the office in 1-2 weeks.

## 2023-10-06 NOTE — DISCHARGE NOTE PROVIDER - NSDCCPCAREPLAN_GEN_ALL_CORE_FT
PRINCIPAL DISCHARGE DIAGNOSIS  Diagnosis: Abdominal pain  Assessment and Plan of Treatment: 2/2 foreign body   s/p 2 attempts at colonoscopy to retrieve  NOTIFY YOUR SURGEON IF: You have any bleeding any fever (over 100.4 F) or chills, persistent nausea/vomiting with inability to tolerate food or liquids, persistent diarrhea, or if your pain is not controlled.  FOLLOW-UP:  1. Please call to make a follow-up appointment within one week of discharge   2. Please follow up with your primary care physician in one week regarding your hospitalization.

## 2023-10-06 NOTE — DISCHARGE NOTE PROVIDER - NSDCFUSCHEDAPPT_GEN_ALL_CORE_FT
Safia, Alexia  [facility]  [facility] [address]  Scheduled Appointment: 10/18/2023

## 2023-10-06 NOTE — PROGRESS NOTE ADULT - SUBJECTIVE AND OBJECTIVE BOX
Overnight: Post procedure check completed. Tolerating reg diet.    SUBJECTIVE: Patient seen and examined on AM rounds. Patient denies any complaints. Tolerating reg diet without nausea or vomiting. + Flatus. + BM. Voiding appropriately. Ambulating well. Denies fevers, chills, SOB, CP.      Vital Signs Last 24 Hrs  T(C): 36.4 (06 Oct 2023 00:53), Max: 36.8 (05 Oct 2023 13:00)  T(F): 97.5 (06 Oct 2023 00:53), Max: 98.2 (05 Oct 2023 13:00)  HR: 80 (06 Oct 2023 00:53) (58 - 99)  BP: 96/64 (06 Oct 2023 00:53) (96/64 - 134/80)  BP(mean): 88 (05 Oct 2023 16:31) (88 - 88)  RR: 18 (06 Oct 2023 00:53) (12 - 18)  SpO2: 96% (06 Oct 2023 00:53) (93% - 100%)    Parameters below as of 06 Oct 2023 00:53  Patient On (Oxygen Delivery Method): room air        I&O's Detail    04 Oct 2023 07:01  -  05 Oct 2023 07:00  --------------------------------------------------------  IN:    dextrose 5% + sodium chloride 0.45% w/ Additives: 720 mL    dextrose 5% + sodium chloride 0.45% w/ Additives: 1350 mL    IV PiggyBack: 100 mL    IV PiggyBack: 400 mL    IV PiggyBack: 200 mL    Oral Fluid: 740 mL  Total IN: 3510 mL    OUT:  Total OUT: 0 mL    Total NET: 3510 mL      05 Oct 2023 07:01  -  06 Oct 2023 01:40  --------------------------------------------------------  IN:    IV PiggyBack: 200 mL    Oral Fluid: 120 mL  Total IN: 320 mL    OUT:  Total OUT: 0 mL    Total NET: 320 mL          Physical Exam:  General: NAD  Neurological: AO X4  Respiratory: nonlabored respirations  Cardiac: RRR  Abdominal: soft, nontender, mildly distended, no rebound, no guarding. Full-length midline laparotomy scar, port scars, Pfannenstiel scar     LABS:                        11.0   5.03  )-----------( 335      ( 05 Oct 2023 07:13 )             35.4     10-05    141  |  103  |  <4<L>  ----------------------------<  95  4.1   |  24  |  1.00    Ca    9.1      05 Oct 2023 07:13  Phos  3.1     10-05  Mg     1.8     10-05        Urinalysis Basic - ( 05 Oct 2023 07:13 )    Color: x / Appearance: x / SG: x / pH: x  Gluc: 95 mg/dL / Ketone: x  / Bili: x / Urobili: x   Blood: x / Protein: x / Nitrite: x   Leuk Esterase: x / RBC: x / WBC x   Sq Epi: x / Non Sq Epi: x / Bacteria: x        RADIOLOGY & ADDITIONAL STUDIES:   Overnight: Post procedure check completed. Tolerating reg diet.    SUBJECTIVE: Patient seen and examined on AM rounds. Patient w/ mild abd pain. Tolerating reg diet without nausea or vomiting. + Flatus. + BM. Voiding appropriately. Ambulating well. Denies fevers, chills, SOB, CP.      Vital Signs Last 24 Hrs  T(C): 36.4 (06 Oct 2023 00:53), Max: 36.8 (05 Oct 2023 13:00)  T(F): 97.5 (06 Oct 2023 00:53), Max: 98.2 (05 Oct 2023 13:00)  HR: 80 (06 Oct 2023 00:53) (58 - 99)  BP: 96/64 (06 Oct 2023 00:53) (96/64 - 134/80)  BP(mean): 88 (05 Oct 2023 16:31) (88 - 88)  RR: 18 (06 Oct 2023 00:53) (12 - 18)  SpO2: 96% (06 Oct 2023 00:53) (93% - 100%)    Parameters below as of 06 Oct 2023 00:53  Patient On (Oxygen Delivery Method): room air        I&O's Detail    04 Oct 2023 07:01  -  05 Oct 2023 07:00  --------------------------------------------------------  IN:    dextrose 5% + sodium chloride 0.45% w/ Additives: 720 mL    dextrose 5% + sodium chloride 0.45% w/ Additives: 1350 mL    IV PiggyBack: 100 mL    IV PiggyBack: 400 mL    IV PiggyBack: 200 mL    Oral Fluid: 740 mL  Total IN: 3510 mL    OUT:  Total OUT: 0 mL    Total NET: 3510 mL      05 Oct 2023 07:01  -  06 Oct 2023 01:40  --------------------------------------------------------  IN:    IV PiggyBack: 200 mL    Oral Fluid: 120 mL  Total IN: 320 mL    OUT:  Total OUT: 0 mL    Total NET: 320 mL          Physical Exam:  General: NAD  Neurological: AO X4  Respiratory: nonlabored respirations  Cardiac: RRR  Abdominal: soft, mildly TTP in LLQ, no rebound, no guarding. Full-length midline laparotomy scar, port scars, Pfannenstiel scar     LABS:                        11.0   5.03  )-----------( 335      ( 05 Oct 2023 07:13 )             35.4     10-05    141  |  103  |  <4<L>  ----------------------------<  95  4.1   |  24  |  1.00    Ca    9.1      05 Oct 2023 07:13  Phos  3.1     10-05  Mg     1.8     10-05        Urinalysis Basic - ( 05 Oct 2023 07:13 )    Color: x / Appearance: x / SG: x / pH: x  Gluc: 95 mg/dL / Ketone: x  / Bili: x / Urobili: x   Blood: x / Protein: x / Nitrite: x   Leuk Esterase: x / RBC: x / WBC x   Sq Epi: x / Non Sq Epi: x / Bacteria: x        RADIOLOGY & ADDITIONAL STUDIES:

## 2023-10-06 NOTE — DISCHARGE NOTE NURSING/CASE MANAGEMENT/SOCIAL WORK - NSDCPEFALRISK_GEN_ALL_CORE
For information on Fall & Injury Prevention, visit: [email or web address] OR  [email or web address] OR  https://www.cdc.gov/steadi/patient.html

## 2023-10-06 NOTE — DISCHARGE NOTE NURSING/CASE MANAGEMENT/SOCIAL WORK - PATIENT PORTAL LINK FT
You can access the FollowMyHealth Patient Portal offered by [facility] by registering at the following website: [email or web address]. By joining [facility]’s FollowMyHealth portal, you will also be able to view your health information using other applications (apps) compatible with our system.

## 2023-10-06 NOTE — DISCHARGE NOTE PROVIDER - NSDCMRMEDTOKEN_GEN_ALL_CORE_FT
amLODIPine 10 mg oral tablet: 1 tab(s) orally once a day  amoxicillin-clavulanate 875 mg-125 mg oral tablet: 875 milligram(s) orally 2 times a day  busPIRone 10 mg oral tablet: 2 tab(s) orally 2 times a day  chlorthalidone 25 mg oral tablet: 0.5 tab(s) orally once a day  losartan 100 mg oral tablet: 1 tab(s) orally once a day  melatonin 5 mg oral tablet: 1 tab(s) orally once a day (at bedtime) as needed for  insomnia  mirtazapine 15 mg oral tablet: 1 tab(s) orally once a day (at bedtime) as needed for AMS  pantoprazole 40 mg oral delayed release tablet: 1 tab(s) orally once a day  Wegovy (1 mg dose) subcutaneous solution: 1 milligram(s) subcutaneously once a week

## 2023-10-06 NOTE — DISCHARGE NOTE PROVIDER - CARE PROVIDER_API CALL
Chris Dia  Colon/Rectal Surgery  [address] [place] [address], [address]  [place] [address]  Phone: [phone]  Fax: [phone]  Follow Up Time: 2 weeks

## 2023-10-11 NOTE — PRE-ANESTHESIA EVALUATION ADULT - WEIGHT IN LBS
STRESS  Pt notified of no acute findings. Provider will discuss results at f/u. Pt reminded of appt date and time.  ----- Message from Blank Daniels MA sent at 10/10/2023  4:53 PM EDT -----    ----- Message -----  From: Garth Reilly APRN  Sent: 10/10/2023   3:57 PM EDT  To: Blank Daniels MA    Patient was found to have a normal stress test with no evidence of ischemia.  Keep follow-up.    
233

## 2023-10-18 ENCOUNTER — APPOINTMENT (OUTPATIENT)
Dept: INTERNAL MEDICINE | Facility: CLINIC | Age: 63
End: 2023-10-18
Payer: COMMERCIAL

## 2023-10-18 VITALS
SYSTOLIC BLOOD PRESSURE: 120 MMHG | WEIGHT: 224 LBS | BODY MASS INDEX: 36 KG/M2 | DIASTOLIC BLOOD PRESSURE: 80 MMHG | HEIGHT: 66 IN

## 2023-10-18 DIAGNOSIS — T18.4XXA FOREIGN BODY IN COLON, INITIAL ENCOUNTER: ICD-10-CM

## 2023-10-18 DIAGNOSIS — S73.199A OTHER SPRAIN OF UNSPECIFIED HIP, INITIAL ENCOUNTER: ICD-10-CM

## 2023-10-18 DIAGNOSIS — Z20.822 CONTACT WITH AND (SUSPECTED) EXPOSURE TO COVID-19: ICD-10-CM

## 2023-10-18 PROCEDURE — 99495 TRANSJ CARE MGMT MOD F2F 14D: CPT

## 2023-10-19 ENCOUNTER — APPOINTMENT (OUTPATIENT)
Dept: SURGERY | Facility: CLINIC | Age: 63
End: 2023-10-19
Payer: COMMERCIAL

## 2023-10-19 VITALS
WEIGHT: 225 LBS | SYSTOLIC BLOOD PRESSURE: 106 MMHG | OXYGEN SATURATION: 96 % | BODY MASS INDEX: 36.16 KG/M2 | HEART RATE: 90 BPM | HEIGHT: 66 IN | DIASTOLIC BLOOD PRESSURE: 72 MMHG | RESPIRATION RATE: 17 BRPM | TEMPERATURE: 97.5 F

## 2023-10-19 DIAGNOSIS — Z78.9 OTHER SPECIFIED HEALTH STATUS: ICD-10-CM

## 2023-10-19 DIAGNOSIS — T18.4XXS: ICD-10-CM

## 2023-10-19 PROBLEM — Z20.822 EXPOSURE TO COVID-19 VIRUS: Status: RESOLVED | Noted: 2022-04-06 | Resolved: 2023-10-19

## 2023-10-19 PROBLEM — T18.4XXA FOREIGN BODY IN COLON: Status: ACTIVE | Noted: 2023-10-19

## 2023-10-19 PROBLEM — S73.199A: Status: RESOLVED | Noted: 2023-08-16 | Resolved: 2023-10-19

## 2023-10-19 PROCEDURE — 99213 OFFICE O/P EST LOW 20 MIN: CPT

## 2023-10-26 ENCOUNTER — RESULT REVIEW (OUTPATIENT)
Age: 63
End: 2023-10-26

## 2023-10-27 ENCOUNTER — OUTPATIENT (OUTPATIENT)
Dept: OUTPATIENT SERVICES | Facility: HOSPITAL | Age: 63
LOS: 1 days | End: 2023-10-27
Payer: COMMERCIAL

## 2023-10-27 ENCOUNTER — APPOINTMENT (OUTPATIENT)
Dept: CT IMAGING | Facility: IMAGING CENTER | Age: 63
End: 2023-10-27
Payer: COMMERCIAL

## 2023-10-27 DIAGNOSIS — Z98.84 BARIATRIC SURGERY STATUS: Chronic | ICD-10-CM

## 2023-10-27 DIAGNOSIS — T18.4XXS: ICD-10-CM

## 2023-10-27 PROCEDURE — 74176 CT ABD & PELVIS W/O CONTRAST: CPT

## 2023-10-27 PROCEDURE — 74176 CT ABD & PELVIS W/O CONTRAST: CPT | Mod: 26

## 2023-11-30 ENCOUNTER — APPOINTMENT (OUTPATIENT)
Dept: ULTRASOUND IMAGING | Facility: CLINIC | Age: 63
End: 2023-11-30
Payer: COMMERCIAL

## 2023-11-30 PROCEDURE — 76856 US EXAM PELVIC COMPLETE: CPT | Mod: 59

## 2023-11-30 PROCEDURE — 76830 TRANSVAGINAL US NON-OB: CPT

## 2023-12-02 ENCOUNTER — TRANSCRIPTION ENCOUNTER (OUTPATIENT)
Age: 63
End: 2023-12-02

## 2023-12-12 ENCOUNTER — APPOINTMENT (OUTPATIENT)
Dept: OBGYN | Facility: CLINIC | Age: 63
End: 2023-12-12
Payer: COMMERCIAL

## 2023-12-12 VITALS
SYSTOLIC BLOOD PRESSURE: 135 MMHG | WEIGHT: 221 LBS | HEART RATE: 97 BPM | HEIGHT: 66 IN | DIASTOLIC BLOOD PRESSURE: 87 MMHG | BODY MASS INDEX: 35.52 KG/M2

## 2023-12-12 DIAGNOSIS — N95.9 UNSPECIFIED MENOPAUSAL AND PERIMENOPAUSAL DISORDER: ICD-10-CM

## 2023-12-12 PROCEDURE — 58100 BIOPSY OF UTERUS LINING: CPT

## 2023-12-12 PROCEDURE — 99213 OFFICE O/P EST LOW 20 MIN: CPT | Mod: 25

## 2023-12-14 LAB — HPV HIGH+LOW RISK DNA PNL CVX: NOT DETECTED

## 2023-12-17 LAB — CYTOLOGY CVX/VAG DOC THIN PREP: ABNORMAL

## 2023-12-18 LAB — CORE LAB BIOPSY: NORMAL

## 2023-12-30 ENCOUNTER — RX RENEWAL (OUTPATIENT)
Age: 63
End: 2023-12-30

## 2024-01-03 ENCOUNTER — NON-APPOINTMENT (OUTPATIENT)
Age: 64
End: 2024-01-03

## 2024-01-31 ENCOUNTER — NON-APPOINTMENT (OUTPATIENT)
Age: 64
End: 2024-01-31

## 2024-01-31 ENCOUNTER — APPOINTMENT (OUTPATIENT)
Dept: INTERNAL MEDICINE | Facility: CLINIC | Age: 64
End: 2024-01-31
Payer: COMMERCIAL

## 2024-01-31 ENCOUNTER — OUTPATIENT (OUTPATIENT)
Dept: OUTPATIENT SERVICES | Facility: HOSPITAL | Age: 64
LOS: 1 days | End: 2024-01-31

## 2024-01-31 VITALS — BODY MASS INDEX: 35.02 KG/M2 | WEIGHT: 217 LBS | DIASTOLIC BLOOD PRESSURE: 80 MMHG | SYSTOLIC BLOOD PRESSURE: 120 MMHG

## 2024-01-31 VITALS
DIASTOLIC BLOOD PRESSURE: 74 MMHG | RESPIRATION RATE: 16 BRPM | HEART RATE: 68 BPM | TEMPERATURE: 98 F | SYSTOLIC BLOOD PRESSURE: 106 MMHG | HEIGHT: 67 IN | OXYGEN SATURATION: 97 % | WEIGHT: 218.04 LBS

## 2024-01-31 DIAGNOSIS — F41.9 ANXIETY DISORDER, UNSPECIFIED: ICD-10-CM

## 2024-01-31 DIAGNOSIS — N84.0 POLYP OF CORPUS UTERI: ICD-10-CM

## 2024-01-31 DIAGNOSIS — I10 ESSENTIAL (PRIMARY) HYPERTENSION: ICD-10-CM

## 2024-01-31 DIAGNOSIS — N95.0 POSTMENOPAUSAL BLEEDING: ICD-10-CM

## 2024-01-31 DIAGNOSIS — Z98.84 BARIATRIC SURGERY STATUS: Chronic | ICD-10-CM

## 2024-01-31 DIAGNOSIS — Z98.890 OTHER SPECIFIED POSTPROCEDURAL STATES: Chronic | ICD-10-CM

## 2024-01-31 DIAGNOSIS — Z98.891 HISTORY OF UTERINE SCAR FROM PREVIOUS SURGERY: Chronic | ICD-10-CM

## 2024-01-31 DIAGNOSIS — Z01.818 ENCOUNTER FOR OTHER PREPROCEDURAL EXAMINATION: ICD-10-CM

## 2024-01-31 DIAGNOSIS — G47.33 OBSTRUCTIVE SLEEP APNEA (ADULT) (PEDIATRIC): ICD-10-CM

## 2024-01-31 DIAGNOSIS — E66.01 MORBID (SEVERE) OBESITY DUE TO EXCESS CALORIES: ICD-10-CM

## 2024-01-31 LAB
ANION GAP SERPL CALC-SCNC: 11 MMOL/L — SIGNIFICANT CHANGE UP (ref 7–14)
BUN SERPL-MCNC: 21 MG/DL — SIGNIFICANT CHANGE UP (ref 7–23)
CALCIUM SERPL-MCNC: 9.2 MG/DL — SIGNIFICANT CHANGE UP (ref 8.4–10.5)
CHLORIDE SERPL-SCNC: 103 MMOL/L — SIGNIFICANT CHANGE UP (ref 98–107)
CO2 SERPL-SCNC: 26 MMOL/L — SIGNIFICANT CHANGE UP (ref 22–31)
CREAT SERPL-MCNC: 1.05 MG/DL — SIGNIFICANT CHANGE UP (ref 0.5–1.3)
EGFR: 60 ML/MIN/1.73M2 — SIGNIFICANT CHANGE UP
GLUCOSE SERPL-MCNC: 71 MG/DL — SIGNIFICANT CHANGE UP (ref 70–99)
HCT VFR BLD CALC: 39.2 % — SIGNIFICANT CHANGE UP (ref 34.5–45)
HGB BLD-MCNC: 12.3 G/DL — SIGNIFICANT CHANGE UP (ref 11.5–15.5)
MCHC RBC-ENTMCNC: 28.9 PG — SIGNIFICANT CHANGE UP (ref 27–34)
MCHC RBC-ENTMCNC: 31.4 GM/DL — LOW (ref 32–36)
MCV RBC AUTO: 92.2 FL — SIGNIFICANT CHANGE UP (ref 80–100)
NRBC # BLD: 0 /100 WBCS — SIGNIFICANT CHANGE UP (ref 0–0)
NRBC # FLD: 0 K/UL — SIGNIFICANT CHANGE UP (ref 0–0)
PLATELET # BLD AUTO: 293 K/UL — SIGNIFICANT CHANGE UP (ref 150–400)
POTASSIUM SERPL-MCNC: 4.1 MMOL/L — SIGNIFICANT CHANGE UP (ref 3.5–5.3)
POTASSIUM SERPL-SCNC: 4.1 MMOL/L — SIGNIFICANT CHANGE UP (ref 3.5–5.3)
RBC # BLD: 4.25 M/UL — SIGNIFICANT CHANGE UP (ref 3.8–5.2)
RBC # FLD: 12.8 % — SIGNIFICANT CHANGE UP (ref 10.3–14.5)
SODIUM SERPL-SCNC: 140 MMOL/L — SIGNIFICANT CHANGE UP (ref 135–145)
WBC # BLD: 7.47 K/UL — SIGNIFICANT CHANGE UP (ref 3.8–10.5)
WBC # FLD AUTO: 7.47 K/UL — SIGNIFICANT CHANGE UP (ref 3.8–10.5)

## 2024-01-31 PROCEDURE — G2211 COMPLEX E/M VISIT ADD ON: CPT

## 2024-01-31 PROCEDURE — 99213 OFFICE O/P EST LOW 20 MIN: CPT

## 2024-01-31 PROCEDURE — 93000 ELECTROCARDIOGRAM COMPLETE: CPT | Mod: 59

## 2024-01-31 RX ORDER — CHLORTHALIDONE 50 MG
0.5 TABLET ORAL
Refills: 0 | DISCHARGE

## 2024-01-31 RX ORDER — SODIUM CHLORIDE 9 MG/ML
1000 INJECTION, SOLUTION INTRAVENOUS
Refills: 0 | Status: DISCONTINUED | OUTPATIENT
Start: 2024-02-06 | End: 2024-02-20

## 2024-01-31 RX ORDER — SEMAGLUTIDE 0.68 MG/ML
1 INJECTION, SOLUTION SUBCUTANEOUS
Refills: 0 | DISCHARGE

## 2024-01-31 RX ORDER — SODIUM CHLORIDE 9 MG/ML
3 INJECTION INTRAMUSCULAR; INTRAVENOUS; SUBCUTANEOUS EVERY 8 HOURS
Refills: 0 | Status: DISCONTINUED | OUTPATIENT
Start: 2024-02-06 | End: 2024-02-20

## 2024-01-31 RX ORDER — LANOLIN ALCOHOL/MO/W.PET/CERES
1 CREAM (GRAM) TOPICAL
Refills: 0 | DISCHARGE

## 2024-01-31 RX ORDER — LOSARTAN POTASSIUM 100 MG/1
1 TABLET, FILM COATED ORAL
Refills: 0 | DISCHARGE

## 2024-01-31 RX ORDER — PANTOPRAZOLE SODIUM 20 MG/1
1 TABLET, DELAYED RELEASE ORAL
Refills: 0 | DISCHARGE

## 2024-01-31 RX ORDER — MIRTAZAPINE 45 MG/1
1 TABLET, ORALLY DISINTEGRATING ORAL
Refills: 0 | DISCHARGE

## 2024-01-31 NOTE — H&P PST ADULT - NSICDXFAMILYHX_GEN_ALL_CORE_FT
FAMILY HISTORY:  Father  Still living? Unknown  FH: diabetes mellitus, Age at diagnosis: Age Unknown  FH: lung cancer, Age at diagnosis: Age Unknown    Mother  Still living? Unknown  FH: uterine cancer, Age at diagnosis: Age Unknown    Sibling  Still living? Unknown  FH: diabetes mellitus, Age at diagnosis: Age Unknown

## 2024-01-31 NOTE — H&P PST ADULT - NSICDXPASTSURGICALHX_GEN_ALL_CORE_FT
PAST SURGICAL HISTORY:   History age 39    Bariatric surgery status s/p gastric sleeve    Histoty of Tubal Ligation     S/P  section     S/P colonoscopy     S/P D&C (status post dilation and curettage)

## 2024-01-31 NOTE — H&P PST ADULT - RESPIRATORY
clear to auscultation bilaterally/no wheezes/breath sounds equal/respirations non-labored clear to auscultation bilaterally/no wheezes/breath sounds equal/good air movement/respirations non-labored

## 2024-01-31 NOTE — H&P PST ADULT - ASSESSMENT
62 y/o female presents to PST preop for dilation curettage hysteroscopy symphion. Pt presented to the GYN reporting PMB x1 week. She reports endometrial polyp was noted on TUVS.

## 2024-01-31 NOTE — H&P PST ADULT - NS ATTEND AMEND GEN_ALL_CORE FT
pt with PMPB and polyp on sono for pelvic eua--aware learnersd may be present,D/C hysteroscopy,resection endometrial lesion,removal/repair ,diseased /dam,aged tissue. Rev R/B/A,side effects,compls incldg but not limited to infection,bleeding ,ut perf. Ample time for questions provided to this pt

## 2024-01-31 NOTE — H&P PST ADULT - HISTORY OF PRESENT ILLNESS
64 y/o female presents to PST preop for dilation curettage hysteroscopy symphion. Pt presented to the GYN reporting PMB x1 week. She reports endometrial polyp was noted on TUVS.

## 2024-01-31 NOTE — H&P PST ADULT - PROBLEM/PLAN-3
rubbing alcohol or hydrogen peroxide.  Continue this regimen until the area is pink and healed. Depending on the size and location of your cryosurgery site, healing may take 2 to 4 weeks.  The area may continue to be pink for several weeks, and over the next few months may become darker or lighter than the surrounding skin. This may be a permanent change. DISPLAY PLAN FREE TEXT

## 2024-01-31 NOTE — H&P PST ADULT - FUNCTIONAL STATUS
Activity: walks 3-4miles 3x/week, performs moderate to heavy housework, climbs stairs   Symptoms: None  Mets: 8.97 using DASI calculator

## 2024-01-31 NOTE — H&P PST ADULT - NSICDXPASTMEDICALHX_GEN_ALL_CORE_FT
PAST MEDICAL HISTORY:  Anxiety     Colon perforation     GERD (gastroesophageal reflux disease)     HTN (hypertension)     Morbidly Obese     BRAULIO (obstructive sleep apnea)     Polyp of corpus uteri     Postmenopausal bleeding

## 2024-01-31 NOTE — H&P PST ADULT - PROBLEM SELECTOR PLAN 1
preop for dilation curettage hysteroscopy symphion on 2/6/24  preop instructions given, pt verbalized understanding  pt will take prescribed pantoprazole AM of surgery for GI prophylaxis  cbc, bmp pending

## 2024-02-01 NOTE — ASSESSMENT
[Patient Optimized for Surgery] : Patient optimized for surgery [FreeTextEntry7] : hold chlorthalidone on day of surgery. hold wegovy 1 week prior to surgery. avoids nsaids and mvi 1 week prior to surgery

## 2024-02-01 NOTE — HISTORY OF PRESENT ILLNESS
[Self] : no previous adverse anesthesia reaction [(Patient denies any chest pain, claudication, dyspnea on exertion, orthopnea, palpitations or syncope)] : Patient denies any chest pain, claudication, dyspnea on exertion, orthopnea, palpitations or syncope [FreeTextEntry1] : d & c [FreeTextEntry2] : 2/6/24 [FreeTextEntry3] : [place]

## 2024-02-05 ENCOUNTER — TRANSCRIPTION ENCOUNTER (OUTPATIENT)
Age: 64
End: 2024-02-05

## 2024-02-06 ENCOUNTER — TRANSCRIPTION ENCOUNTER (OUTPATIENT)
Age: 64
End: 2024-02-06

## 2024-02-06 ENCOUNTER — OUTPATIENT (OUTPATIENT)
Dept: OUTPATIENT SERVICES | Facility: HOSPITAL | Age: 64
LOS: 1 days | Discharge: HOME | End: 2024-02-06
Payer: COMMERCIAL

## 2024-02-06 ENCOUNTER — APPOINTMENT (OUTPATIENT)
Dept: OBGYN | Facility: HOSPITAL | Age: 64
End: 2024-02-06

## 2024-02-06 VITALS
DIASTOLIC BLOOD PRESSURE: 71 MMHG | RESPIRATION RATE: 15 BRPM | OXYGEN SATURATION: 98 % | SYSTOLIC BLOOD PRESSURE: 114 MMHG | HEART RATE: 68 BPM | TEMPERATURE: 98 F

## 2024-02-06 VITALS
DIASTOLIC BLOOD PRESSURE: 79 MMHG | WEIGHT: 218.04 LBS | SYSTOLIC BLOOD PRESSURE: 129 MMHG | TEMPERATURE: 98 F | HEART RATE: 78 BPM | HEIGHT: 67 IN | OXYGEN SATURATION: 100 % | RESPIRATION RATE: 16 BRPM

## 2024-02-06 DIAGNOSIS — Z98.890 OTHER SPECIFIED POSTPROCEDURAL STATES: Chronic | ICD-10-CM

## 2024-02-06 DIAGNOSIS — N84.0 POLYP OF CORPUS UTERI: ICD-10-CM

## 2024-02-06 DIAGNOSIS — Z98.891 HISTORY OF UTERINE SCAR FROM PREVIOUS SURGERY: Chronic | ICD-10-CM

## 2024-02-06 DIAGNOSIS — Z98.84 BARIATRIC SURGERY STATUS: Chronic | ICD-10-CM

## 2024-02-06 PROCEDURE — 58555 HYSTEROSCOPY DX SEP PROC: CPT | Mod: GC,53

## 2024-02-06 RX ORDER — SODIUM CHLORIDE 9 MG/ML
1000 INJECTION, SOLUTION INTRAVENOUS
Refills: 0 | Status: DISCONTINUED | OUTPATIENT
Start: 2024-02-06 | End: 2024-02-20

## 2024-02-06 NOTE — BRIEF OPERATIVE NOTE - OPERATION/FINDINGS
EUA: external genitalia wnl, stenotic cervix, mobile uterus.  Hysteroscopy attempted, however aborted due to possible creation of false passage  FD: 600

## 2024-02-06 NOTE — ASU DISCHARGE PLAN (ADULT/PEDIATRIC) - CARE PROVIDER_API CALL
Jessica Vides  Obstetrics and Gynecology  [address] [place] [address], [address]  [place] [address]  Phone: [phone]  Fax: [phone]  Follow Up Time: 1 month

## 2024-02-06 NOTE — ASU DISCHARGE PLAN (ADULT/PEDIATRIC) - MEDICATION INSTRUCTIONS
You can take up to 600mg Ibuprofen every 6 hours and/or Tylenol 975mg every 6 hours. You can take up to 600mg Ibuprofen every 6 hours and/or Tylenol 975mg every 6 hours.You received tylenol and an IV form of ibruprofen in the operating room at 9:15 am, you may take again as needed for discomfort after 3:15 pm.

## 2024-02-08 PROBLEM — N95.0 POSTMENOPAUSAL BLEEDING: Chronic | Status: ACTIVE | Noted: 2024-01-31

## 2024-02-08 PROBLEM — N84.0 POLYP OF CORPUS UTERI: Chronic | Status: ACTIVE | Noted: 2024-01-31

## 2024-02-08 PROBLEM — F41.9 ANXIETY DISORDER, UNSPECIFIED: Chronic | Status: ACTIVE | Noted: 2024-01-31

## 2024-02-08 PROBLEM — G47.33 OBSTRUCTIVE SLEEP APNEA (ADULT) (PEDIATRIC): Chronic | Status: ACTIVE | Noted: 2024-01-31

## 2024-02-08 PROBLEM — K63.1 PERFORATION OF INTESTINE (NONTRAUMATIC): Chronic | Status: ACTIVE | Noted: 2024-01-31

## 2024-02-13 ENCOUNTER — NON-APPOINTMENT (OUTPATIENT)
Age: 64
End: 2024-02-13

## 2024-02-15 ENCOUNTER — NON-APPOINTMENT (OUTPATIENT)
Age: 64
End: 2024-02-15

## 2024-02-26 ENCOUNTER — APPOINTMENT (OUTPATIENT)
Dept: GYNECOLOGIC ONCOLOGY | Facility: CLINIC | Age: 64
End: 2024-02-26
Payer: COMMERCIAL

## 2024-02-26 VITALS
DIASTOLIC BLOOD PRESSURE: 78 MMHG | BODY MASS INDEX: 35.03 KG/M2 | WEIGHT: 218 LBS | SYSTOLIC BLOOD PRESSURE: 117 MMHG | HEART RATE: 86 BPM | HEIGHT: 66 IN

## 2024-02-26 PROCEDURE — 99205 OFFICE O/P NEW HI 60 MIN: CPT

## 2024-02-26 NOTE — DISCUSSION/SUMMARY
[FreeTextEntry1] : 64 yo with thickened endometrium and PMB  I discussed my recommendations with Ms. Chadwick in detail  The workup of thickened endometrium and PMB was discussed.  D&C was not successful due to cervical stenosis.  As a result, options include definitive hysterectomy to evaluate for endometrial pathology vs continued surveillance.  Risks/benefits of each option discussed in detail.  Postoperative recovery and expectations for minimally invasive hysterectomy was discussed.  Risks including bleeding, infection, injury to bowel/bladder, conversion to laparotomy was discussed.  She would like to proceed with surgery ASAP.  Will schedule for surgery in 3/2024.  Will obtain operative reports from Dr. Winslow.  She reports no history of hernia repair.

## 2024-02-26 NOTE — OB HISTORY
[Total Preg ___] : : [unfilled] [Abortions ___] : [unfilled] (abortions) [Living ___] : [unfilled] (living) [Vaginal ___] : [unfilled] vaginal delivery(s) [ ___] : [unfilled]  section delivery(s) [AB Spont ___] : [unfilled] miscarriage(s) [unknown] : the patient is unsure of the date of her LMP [Menarche Age ____] : age at menarche was [unfilled]

## 2024-02-26 NOTE — HISTORY OF PRESENT ILLNESS
[FreeTextEntry1] : Referred by Dr. Vides PCP: Dr. Baig  Ms. Chadwick is a 63 year old P6 postmenopausal female, referred by Dr. Vides, for PMB in the 2023.  Workup included pelvic sonogram which revealed a questionable endometrial polyp.  She underwent attempted D&C in 2024 which was not successful due to cervical stenosis.  2023- TVUS-   Uterus: 9.9 x 5.4 x 5.6 cm. There is a fibroid measuring 4.7 x 4.6 x 4.0 cm.   Endometrium: 4 mm. There is trace fluid in the endometrial lining. There is a questionable polyp measuring 9 x 5 x 8 mm.   Right ovary: 1.8 x 1.2 x 2.2 cm. Within normal limits.   Left ovary: 2.0 x 1.2 x 1.9 cm. Within normal limits.  2024- Pelvic exam under anesthesia, attempted dilation and curettage, hysteroscopy, only hysteroscopic evaluation was to be performed at the endocervical canal.  PMHx- HTN PSHx- LSC gastric sleeve, abdominoplasty, , ectopic Family hx of cancer- mother with ovarian cancer, father with lung cancer  She denies abdominal/pelvic pain, dyspnea or chest pain, nausea/vomiting, changes in bowel habits or urination, lower extremity edema or pain.  She denies all other associated signs and symptoms.  She is here to discuss further management.     HM Pap- 2023- negative, HRHPV (-) Mammo- - negative Colonoscopy- - negative

## 2024-02-26 NOTE — PHYSICAL EXAM
[Normal] : Anus and perineum: Normal sphincter tone, no masses, no prolapse. [de-identified] : laparoscopic scar, vertical scar

## 2024-02-26 NOTE — PAST MEDICAL HISTORY
[Menarche Age ____] : age at menarche was [unfilled] [Total Preg ___] : G[unfilled] [Full Term ___] : Full Term: [unfilled] [Live Births ___] : P[unfilled]  [Abortions ___] : Abortions:[unfilled] [Living ___] : Living: [unfilled]

## 2024-03-22 ENCOUNTER — OUTPATIENT (OUTPATIENT)
Dept: OUTPATIENT SERVICES | Facility: HOSPITAL | Age: 64
LOS: 1 days | End: 2024-03-22

## 2024-03-22 VITALS
RESPIRATION RATE: 16 BRPM | SYSTOLIC BLOOD PRESSURE: 121 MMHG | DIASTOLIC BLOOD PRESSURE: 78 MMHG | WEIGHT: 218.04 LBS | OXYGEN SATURATION: 99 % | TEMPERATURE: 98 F | HEART RATE: 87 BPM | HEIGHT: 66 IN

## 2024-03-22 DIAGNOSIS — G47.33 OBSTRUCTIVE SLEEP APNEA (ADULT) (PEDIATRIC): ICD-10-CM

## 2024-03-22 DIAGNOSIS — N84.0 POLYP OF CORPUS UTERI: ICD-10-CM

## 2024-03-22 DIAGNOSIS — I10 ESSENTIAL (PRIMARY) HYPERTENSION: ICD-10-CM

## 2024-03-22 DIAGNOSIS — Z98.890 OTHER SPECIFIED POSTPROCEDURAL STATES: Chronic | ICD-10-CM

## 2024-03-22 DIAGNOSIS — Z98.891 HISTORY OF UTERINE SCAR FROM PREVIOUS SURGERY: Chronic | ICD-10-CM

## 2024-03-22 DIAGNOSIS — Z90.3 ACQUIRED ABSENCE OF STOMACH [PART OF]: Chronic | ICD-10-CM

## 2024-03-22 DIAGNOSIS — Z98.84 BARIATRIC SURGERY STATUS: Chronic | ICD-10-CM

## 2024-03-22 DIAGNOSIS — Z87.59 PERSONAL HISTORY OF OTHER COMPLICATIONS OF PREGNANCY, CHILDBIRTH AND THE PUERPERIUM: Chronic | ICD-10-CM

## 2024-03-22 LAB
A1C WITH ESTIMATED AVERAGE GLUCOSE RESULT: 4.6 % — SIGNIFICANT CHANGE UP (ref 4–5.6)
BLD GP AB SCN SERPL QL: NEGATIVE — SIGNIFICANT CHANGE UP
ESTIMATED AVERAGE GLUCOSE: 85 — SIGNIFICANT CHANGE UP
RH IG SCN BLD-IMP: POSITIVE — SIGNIFICANT CHANGE UP
RH IG SCN BLD-IMP: POSITIVE — SIGNIFICANT CHANGE UP

## 2024-03-22 RX ORDER — IBUPROFEN 200 MG
1 TABLET ORAL
Qty: 0 | Refills: 0 | DISCHARGE

## 2024-03-22 RX ORDER — AMLODIPINE BESYLATE 2.5 MG/1
1 TABLET ORAL
Refills: 0 | DISCHARGE

## 2024-03-22 RX ORDER — ACETAMINOPHEN 500 MG
3 TABLET ORAL
Qty: 0 | Refills: 0 | DISCHARGE

## 2024-03-22 RX ORDER — CHLORHEXIDINE GLUCONATE 213 G/1000ML
1 SOLUTION TOPICAL DAILY
Refills: 0 | Status: DISCONTINUED | OUTPATIENT
Start: 2024-03-28 | End: 2024-04-11

## 2024-03-22 RX ORDER — SODIUM CHLORIDE 9 MG/ML
3 INJECTION INTRAMUSCULAR; INTRAVENOUS; SUBCUTANEOUS EVERY 8 HOURS
Refills: 0 | Status: DISCONTINUED | OUTPATIENT
Start: 2024-03-28 | End: 2024-04-11

## 2024-03-22 NOTE — H&P PST ADULT - TOBACCO USE
Thank you for coming to Urgent Care today. It was a pleasure caring for you!     You may feel more soreness and spasm the first few days after the injury. Rest until symptoms begin to improve.  Apply an ice pack over the injured area for 15 to 20 minutes every 3 to 6 hours. You should do this for the first 24 to 48 hours. You can make an ice pack by filling a plastic bag that seals at the top with ice cubes and then wrapping it with a thin towel. After 48 hours, apply heat (warm shower or warm bath) for 15 to 20 minutes several times a day, or alternate ice and heat. Can use a heating pad.  Can take Tylenol or Ibuprofen for fever or pain. Tylenol not to exceed 4,000mg per day.  Can take over the counter Biofreeze or Icy Hot.  Please call central scheduling to arrange an appointment at [phone]. Referral placed for orthopedics.   RICE method. Can use ankle brace or ace wrap for extra support.    ELVIA Raman    
Former smoker

## 2024-03-22 NOTE — H&P PST ADULT - PROBLEM SELECTOR PLAN 3
Patient instructed to take losartan, amlodipine on day of procedure with small sips of water, hold chlorthalidone on DOS verbalized understanding.

## 2024-03-22 NOTE — H&P PST ADULT - HISTORY OF PRESENT ILLNESS
64 y/o female reports endometrial polyp was noted on TUVS, s/p D&C presents to PST preop for evaluation for robotic assisted total laparoscopic hysterectomy , BSO, possible staging, denies pelvic pain or vaginal bleeding

## 2024-03-22 NOTE — H&P PST ADULT - PROBLEM SELECTOR PLAN 1
Patient scheduled for surgery on: 3/28/24  Provided with verbal and written presurgical instructions  Verbalized understanding  with teach back on the following: personal protonix for GI prophylaxis and chlorhexidine wash    Lab specimen drawn at PST today: type abo hga1c  In chart cbc, bmp    Medical evaluation, EKG, Echo and Stress test in chart  Patient held wegovy one week prior to DOS

## 2024-03-22 NOTE — H&P PST ADULT - NSICDXPASTSURGICALHX_GEN_ALL_CORE_FT
PAST SURGICAL HISTORY:   History age 39    H/O abdominoplasty     H/O gastric sleeve     History of hysteroscopy     Histoty of Tubal Ligation     S/P  section     S/P colonoscopy     S/P D&C (status post dilation and curettage)     S/P ectopic pregnancy

## 2024-03-25 ENCOUNTER — NON-APPOINTMENT (OUTPATIENT)
Age: 64
End: 2024-03-25

## 2024-03-27 ENCOUNTER — TRANSCRIPTION ENCOUNTER (OUTPATIENT)
Age: 64
End: 2024-03-27

## 2024-03-27 NOTE — ASU PATIENT PROFILE, ADULT - ARRIVAL TIME
05:00 Cheek Interpolation Flap Text: A decision was made to reconstruct the defect utilizing an interpolation axial flap and a staged reconstruction.  A telfa template was made of the defect.  This telfa template was then used to outline the Cheek Interpolation flap.  The donor area for the pedicle flap was then injected with anesthesia.  The flap was excised through the skin and subcutaneous tissue down to the layer of the underlying musculature.  The interpolation flap was carefully excised within this deep plane to maintain its blood supply.  The edges of the donor site were undermined.   The donor site was closed in a primary fashion.  The pedicle was then rotated into position and sutured.  Once the tube was sutured into place, adequate blood supply was confirmed with blanching and refill.  The pedicle was then wrapped with xeroform gauze and dressed appropriately with a telfa and gauze bandage to ensure continued blood supply and protect the attached pedicle.

## 2024-03-28 ENCOUNTER — RESULT REVIEW (OUTPATIENT)
Age: 64
End: 2024-03-28

## 2024-03-28 ENCOUNTER — APPOINTMENT (OUTPATIENT)
Dept: GYNECOLOGIC ONCOLOGY | Facility: HOSPITAL | Age: 64
End: 2024-03-28

## 2024-03-28 ENCOUNTER — TRANSCRIPTION ENCOUNTER (OUTPATIENT)
Age: 64
End: 2024-03-28

## 2024-03-28 ENCOUNTER — OUTPATIENT (OUTPATIENT)
Dept: OUTPATIENT SERVICES | Facility: HOSPITAL | Age: 64
LOS: 1 days | Discharge: HOME | End: 2024-03-28
Payer: COMMERCIAL

## 2024-03-28 VITALS
WEIGHT: 218.04 LBS | TEMPERATURE: 98 F | HEART RATE: 87 BPM | DIASTOLIC BLOOD PRESSURE: 66 MMHG | RESPIRATION RATE: 16 BRPM | HEIGHT: 66 IN | SYSTOLIC BLOOD PRESSURE: 111 MMHG | OXYGEN SATURATION: 100 %

## 2024-03-28 VITALS
DIASTOLIC BLOOD PRESSURE: 52 MMHG | RESPIRATION RATE: 16 BRPM | OXYGEN SATURATION: 99 % | HEART RATE: 88 BPM | SYSTOLIC BLOOD PRESSURE: 96 MMHG

## 2024-03-28 DIAGNOSIS — Z98.890 OTHER SPECIFIED POSTPROCEDURAL STATES: Chronic | ICD-10-CM

## 2024-03-28 DIAGNOSIS — Z98.891 HISTORY OF UTERINE SCAR FROM PREVIOUS SURGERY: Chronic | ICD-10-CM

## 2024-03-28 DIAGNOSIS — Z87.59 PERSONAL HISTORY OF OTHER COMPLICATIONS OF PREGNANCY, CHILDBIRTH AND THE PUERPERIUM: Chronic | ICD-10-CM

## 2024-03-28 DIAGNOSIS — N84.0 POLYP OF CORPUS UTERI: ICD-10-CM

## 2024-03-28 DIAGNOSIS — Z90.3 ACQUIRED ABSENCE OF STOMACH [PART OF]: Chronic | ICD-10-CM

## 2024-03-28 LAB
GLUCOSE BLDC GLUCOMTR-MCNC: 91 MG/DL — SIGNIFICANT CHANGE UP (ref 70–99)
HCT VFR BLD CALC: 40.2 % — SIGNIFICANT CHANGE UP (ref 34.5–45)
HGB BLD-MCNC: 12.8 G/DL — SIGNIFICANT CHANGE UP (ref 11.5–15.5)
MCHC RBC-ENTMCNC: 29.2 PG — SIGNIFICANT CHANGE UP (ref 27–34)
MCHC RBC-ENTMCNC: 31.8 GM/DL — LOW (ref 32–36)
MCV RBC AUTO: 91.6 FL — SIGNIFICANT CHANGE UP (ref 80–100)
NRBC # BLD: 0 /100 WBCS — SIGNIFICANT CHANGE UP (ref 0–0)
NRBC # FLD: 0 K/UL — SIGNIFICANT CHANGE UP (ref 0–0)
PLATELET # BLD AUTO: 252 K/UL — SIGNIFICANT CHANGE UP (ref 150–400)
RBC # BLD: 4.39 M/UL — SIGNIFICANT CHANGE UP (ref 3.8–5.2)
RBC # FLD: 12.4 % — SIGNIFICANT CHANGE UP (ref 10.3–14.5)
WBC # BLD: 7.21 K/UL — SIGNIFICANT CHANGE UP (ref 3.8–10.5)
WBC # FLD AUTO: 7.21 K/UL — SIGNIFICANT CHANGE UP (ref 3.8–10.5)

## 2024-03-28 PROCEDURE — 58571 TLH W/T/O 250 G OR LESS: CPT | Mod: AS

## 2024-03-28 PROCEDURE — 88307 TISSUE EXAM BY PATHOLOGIST: CPT | Mod: 26

## 2024-03-28 PROCEDURE — 88112 CYTOPATH CELL ENHANCE TECH: CPT | Mod: 26

## 2024-03-28 PROCEDURE — 58571 TLH W/T/O 250 G OR LESS: CPT | Mod: 22

## 2024-03-28 RX ORDER — ACETAMINOPHEN 500 MG
3 TABLET ORAL
Qty: 0 | Refills: 0 | DISCHARGE

## 2024-03-28 RX ORDER — PANTOPRAZOLE SODIUM 20 MG/1
1 TABLET, DELAYED RELEASE ORAL
Refills: 0 | DISCHARGE

## 2024-03-28 RX ORDER — FENTANYL CITRATE 50 UG/ML
50 INJECTION INTRAVENOUS
Refills: 0 | Status: DISCONTINUED | OUTPATIENT
Start: 2024-03-28 | End: 2024-03-28

## 2024-03-28 RX ORDER — CHLORTHALIDONE 50 MG
0.5 TABLET ORAL
Refills: 0 | DISCHARGE

## 2024-03-28 RX ORDER — OXYCODONE HYDROCHLORIDE 5 MG/1
1 TABLET ORAL
Qty: 5 | Refills: 0
Start: 2024-03-28

## 2024-03-28 RX ORDER — IBUPROFEN 200 MG
1 TABLET ORAL
Qty: 0 | Refills: 0 | DISCHARGE

## 2024-03-28 RX ORDER — KETOROLAC TROMETHAMINE 30 MG/ML
15 SYRINGE (ML) INJECTION ONCE
Refills: 0 | Status: DISCONTINUED | OUTPATIENT
Start: 2024-03-28 | End: 2024-03-28

## 2024-03-28 RX ORDER — OXYCODONE HYDROCHLORIDE 5 MG/1
5 TABLET ORAL ONCE
Refills: 0 | Status: DISCONTINUED | OUTPATIENT
Start: 2024-03-28 | End: 2024-03-28

## 2024-03-28 RX ORDER — SODIUM CHLORIDE 9 MG/ML
1000 INJECTION, SOLUTION INTRAVENOUS
Refills: 0 | Status: DISCONTINUED | OUTPATIENT
Start: 2024-03-28 | End: 2024-03-28

## 2024-03-28 RX ORDER — ONDANSETRON 8 MG/1
4 TABLET, FILM COATED ORAL ONCE
Refills: 0 | Status: DISCONTINUED | OUTPATIENT
Start: 2024-03-28 | End: 2024-04-11

## 2024-03-28 RX ORDER — SODIUM CHLORIDE 9 MG/ML
1000 INJECTION, SOLUTION INTRAVENOUS
Refills: 0 | Status: DISCONTINUED | OUTPATIENT
Start: 2024-03-28 | End: 2024-04-11

## 2024-03-28 RX ORDER — SEMAGLUTIDE 0.68 MG/ML
1 INJECTION, SOLUTION SUBCUTANEOUS
Refills: 0 | DISCHARGE

## 2024-03-28 RX ORDER — AMLODIPINE BESYLATE 2.5 MG/1
1 TABLET ORAL
Refills: 0 | DISCHARGE

## 2024-03-28 RX ORDER — LOSARTAN POTASSIUM 100 MG/1
1 TABLET, FILM COATED ORAL
Refills: 0 | DISCHARGE

## 2024-03-28 RX ADMIN — Medication 15 MILLIGRAM(S): at 12:45

## 2024-03-28 RX ADMIN — OXYCODONE HYDROCHLORIDE 5 MILLIGRAM(S): 5 TABLET ORAL at 11:15

## 2024-03-28 RX ADMIN — Medication 15 MILLIGRAM(S): at 12:30

## 2024-03-28 RX ADMIN — OXYCODONE HYDROCHLORIDE 5 MILLIGRAM(S): 5 TABLET ORAL at 10:40

## 2024-03-28 NOTE — BRIEF OPERATIVE NOTE - NSICDXBRIEFPROCEDURE_GEN_ALL_CORE_FT

## 2024-03-28 NOTE — BRIEF OPERATIVE NOTE - OPERATION/FINDINGS
EUA limited 2/2 body habitus w/ small mobile uterus and small cervix  Laparoscopy with grossly normal omentum, bowel, uterus, bilateral fallopian tubes and ovaries. Noted adhesion of omentum to anterior abdominal wall at site of prior surgical scars.  Bilateral ureters noted vermiculating away from surgical sites.  Vaginal cuff closed with 0 V-loc, good hemostasis Good

## 2024-03-28 NOTE — ASU DISCHARGE PLAN (ADULT/PEDIATRIC) - MEDICATION INSTRUCTIONS
A prescription for Oxycodone was sent to your pharmacy. You can take one tab (5mg) of Oxycodone every 6 hours as needed for severe pain.  Do not take more than 4 tabs in one day.  Do not drive while on this medication. You can take up to 600mg Ibuprofen every 6 hours (if your GI doctors approve you to take NSAIDs) and/or Tylenol 975mg every 6 hours.

## 2024-03-28 NOTE — ASU DISCHARGE PLAN (ADULT/PEDIATRIC) - ASU DC SPECIAL INSTRUCTIONSFT
Postoperative Instructions    For pain control, take the followin. Ibuprofen 600mg every 6 hours, take with food if  you are able to tolerate NSAIDs   2. Add 975mg every 6 hours, alternated with ibuprofen  Tylenol and ibuprofen may be obtained over the counter.  3. Oxycodone 5mg every 6 hours as needed for severe pain. A prescription has been sent to your pharmacy.    Return to your regular way of eating.     Resume normal activity as tolerated, but no heavy lifting or strenuous activity for 6 weeks. Complete vaginal rest, no tampons, no douching, no tub bathing, no sexual activities for 6 weeks unless otherwise instructed by your doctor.      No driving while on narcotic pain medication.      Call your doctor with any signs and symptoms of infection such as fever (>100.4 F), chills, nausea or vomiting.  Call your doctor if you're unable to tolerate food or have difficulty urinating.  Call your doctor if you have pain that is not relieved by your prescribed medications. Call your doctor with redness or swelling at the incision site, fluid leakage or wound separation.    Notify your doctor with any other concerns. Follow up with your doctor in 2 weeks for a post-operative appointment.

## 2024-03-28 NOTE — BRIEF OPERATIVE NOTE - COMMENTS
Additional assistant Flor Santana MD PGY1 Additional assistant Flor Santana MD PGY1  Dict [id number] Additional assistant Flor Santana MD PGY1  Dict [id number]    I, Miguel Mckinnon PA-C, served as the first assistant in this operation. I assisted in placing ports, docking, and targeting the da Velma robot, first assisted at the surgical field while the surgeon was performing the operation at the robotic console by providing instrument exchanges, tissue retraction, suction and irrigation, specimen retrieval, passing and removing sutures and sponges, undocking the robotic platform, and closed surgical wounds.

## 2024-03-28 NOTE — ASU DISCHARGE PLAN (ADULT/PEDIATRIC) - CARE PROVIDER_API CALL
Kenna Ontiveros  Gynecologic Oncology  [address] [place] [address]  Phone: [phone]  Fax: [phone]  Follow Up Time: 2 weeks

## 2024-03-28 NOTE — ASU DISCHARGE PLAN (ADULT/PEDIATRIC) - NS MD DC FALL RISK RISK
For information on Fall & Injury Prevention, visit: [email or web address] OR  [email or web address] OR  https://www.cdc.gov/steadi/patient.html

## 2024-03-28 NOTE — ASU DISCHARGE PLAN (ADULT/PEDIATRIC) - NURSING INSTRUCTIONS
No Tylenol or any Tylenol Products until after 3:30pm today as you were given IV Tylenol in the OR. No IBUPFROFEN or any medication containing  IBUPFROFEN Products until after 6:30pm today  as you were given IV IBUPFROFEN in the OR

## 2024-03-28 NOTE — CHART NOTE - NSCHARTNOTEFT_GEN_A_CORE
Gynecological Oncology PA Post Op Note    Patient seen and examined at bedside, recently post-op. Toradol x 1 given for crampy abdominal pain. Patient denies headache, dizziness, nausea, vomiting, chest pain, palpitations and sob. Simmons removed at 12pm (clear/yellow urine in bag). Patient is tolerating clears.       Vital Signs Last 24 Hours  T(C): 36.4 (03-28-24 @ 12:00), Max: 36.4 (03-28-24 @ 06:32)  HR: 76 (03-28-24 @ 13:00) (76 - 87)  BP: 128/76 (03-28-24 @ 12:45) (102/76 - 128/76)  RR: 13 (03-28-24 @ 13:00) (11 - 17)  SpO2: 98% (03-28-24 @ 13:00) (93% - 100%)    I&O's Summary    28 Mar 2024 07:01  -  28 Mar 2024 13:07  --------------------------------------------------------  IN: 675 mL / OUT: 205 mL / NET: 470 mL        Physical Exam:  General: NAD  CV: RRR  Lungs: bilaterally CTA  Abdomen: soft, non-distended, appropriately tender; scope sites C/D/I.   Ext: No pain or swelling, bilat venodynes in place.    Labs:             12.8   7.21  )-----------( 252      ( 03-28 @ 11:50 )             40.2         MEDICATIONS  (STANDING):  chlorhexidine 2% Cloths 1 Application(s) Topical daily  lactated ringers. 1000 milliLiter(s) (125 mL/Hr) IV Continuous <Continuous>  sodium chloride 0.9% lock flush 3 milliLiter(s) IV Push every 8 hours    MEDICATIONS  (PRN):  fentaNYL    Injectable 50 MICROGram(s) IV Push every 10 minutes PRN Severe Pain (7 - 10)  ondansetron Injectable 4 milliGRAM(s) IV Push once PRN Nausea and/or Vomiting    Assessment/Plan:  62 yo female s/p Robot-assisted, Total Laparoscopic Hysterectomy, with bilateral  salpingo-oophorectomy  recovering well in acute post-operative state. See operative note for details.   - LR @125ml  -Regular diet  -Due to void at: 10pm  -Pain management PRN   -Discharge home once ambulating and voiding without difficulty, tolerating more PO diet.   - F/u with Dr. Ontiveros in 2 weeks.  - Discharge instructions reviewed with patient.    Anthony Mckeon PA-C  #[id number]

## 2024-03-29 LAB — NON-GYNECOLOGICAL CYTOLOGY STUDY: SIGNIFICANT CHANGE UP

## 2024-04-01 ENCOUNTER — NON-APPOINTMENT (OUTPATIENT)
Age: 64
End: 2024-04-01

## 2024-04-04 LAB — SURGICAL PATHOLOGY STUDY: SIGNIFICANT CHANGE UP

## 2024-04-05 ENCOUNTER — APPOINTMENT (OUTPATIENT)
Dept: GYNECOLOGIC ONCOLOGY | Facility: CLINIC | Age: 64
End: 2024-04-05

## 2024-04-09 ENCOUNTER — APPOINTMENT (OUTPATIENT)
Dept: OBGYN | Facility: CLINIC | Age: 64
End: 2024-04-09

## 2024-04-11 ENCOUNTER — APPOINTMENT (OUTPATIENT)
Dept: GYNECOLOGIC ONCOLOGY | Facility: CLINIC | Age: 64
End: 2024-04-11
Payer: COMMERCIAL

## 2024-04-11 VITALS
SYSTOLIC BLOOD PRESSURE: 108 MMHG | BODY MASS INDEX: 35.19 KG/M2 | DIASTOLIC BLOOD PRESSURE: 75 MMHG | TEMPERATURE: 97.3 F | WEIGHT: 218 LBS | HEART RATE: 91 BPM

## 2024-04-11 DIAGNOSIS — N95.0 POSTMENOPAUSAL BLEEDING: ICD-10-CM

## 2024-04-11 PROCEDURE — 99024 POSTOP FOLLOW-UP VISIT: CPT

## 2024-04-12 PROBLEM — N95.0 POSTMENOPAUSAL BLEEDING: Status: ACTIVE | Noted: 2023-11-27

## 2024-04-12 NOTE — REASON FOR VISIT
[Post Op] : post op visit [de-identified] : 3/2024  [de-identified] : Robotic-assisted total laparoscopic hysterectomy, bilateral salpingo-oophorectomy, and lysis of adhesions. [de-identified] : Normal bowel function. Normal bladder function. No vaginal bleeding or malodorous discharge. No fevers/chills.  Incisions without concern. Recovering well.

## 2024-04-12 NOTE — DISCUSSION/SUMMARY
[Clean] : was clean [Dry] : was dry [Intact] : was intact [Erythema] : was not erythematous [Ecchymosis] : was not ecchymotic [Seroma] : had no seroma [Sutures Intact] : had sutures  intact [None] : had no drainage [Normal Skin] : normal appearance [Normal Skin Turgor] : normal skin turgor [Firm] : soft [Tender] : nontender [Rebound] : no rebound tenderness [Guarding] : no guarding [Incisional Hernia] : no incisional hernia [Mass] : no palpable mass [Doing Well] : is doing well [Excellent Pain Control] : has excellent pain control [No Sign of Infection] : is showing no signs of infection [0] : 0 [de-identified] : deferred no VB [de-identified] : /GI function WNL [de-identified] : postoperative recuperation discussed [FreeTextEntry1] : Uterus, cervix, bilateral fallopian tubes and ovaries; hysterectomy and salpingo-oophorectomy: - Leiomyoma. - Adenomyosis.  - Endometrial polyp. - Atrophic endometrium. - Benign ovaries and left fallopian tube.

## 2024-04-18 ENCOUNTER — RESULT REVIEW (OUTPATIENT)
Age: 64
End: 2024-04-18

## 2024-04-18 ENCOUNTER — OUTPATIENT (OUTPATIENT)
Dept: OUTPATIENT SERVICES | Facility: HOSPITAL | Age: 64
LOS: 1 days | End: 2024-04-18
Payer: COMMERCIAL

## 2024-04-18 ENCOUNTER — APPOINTMENT (OUTPATIENT)
Dept: ULTRASOUND IMAGING | Facility: IMAGING CENTER | Age: 64
End: 2024-04-18
Payer: COMMERCIAL

## 2024-04-18 DIAGNOSIS — M79.89 OTHER SPECIFIED SOFT TISSUE DISORDERS: ICD-10-CM

## 2024-04-18 DIAGNOSIS — Z87.59 PERSONAL HISTORY OF OTHER COMPLICATIONS OF PREGNANCY, CHILDBIRTH AND THE PUERPERIUM: Chronic | ICD-10-CM

## 2024-04-18 DIAGNOSIS — Z98.890 OTHER SPECIFIED POSTPROCEDURAL STATES: Chronic | ICD-10-CM

## 2024-04-18 DIAGNOSIS — Z90.3 ACQUIRED ABSENCE OF STOMACH [PART OF]: Chronic | ICD-10-CM

## 2024-04-18 PROCEDURE — 93970 EXTREMITY STUDY: CPT | Mod: 26

## 2024-04-18 PROCEDURE — 93970 EXTREMITY STUDY: CPT

## 2024-04-22 ENCOUNTER — APPOINTMENT (OUTPATIENT)
Dept: INTERNAL MEDICINE | Facility: CLINIC | Age: 64
End: 2024-04-22
Payer: COMMERCIAL

## 2024-04-22 VITALS
DIASTOLIC BLOOD PRESSURE: 80 MMHG | OXYGEN SATURATION: 100 % | BODY MASS INDEX: 35.56 KG/M2 | SYSTOLIC BLOOD PRESSURE: 110 MMHG | HEIGHT: 65.5 IN | WEIGHT: 216 LBS

## 2024-04-22 PROCEDURE — 36415 COLL VENOUS BLD VENIPUNCTURE: CPT

## 2024-04-22 PROCEDURE — 99214 OFFICE O/P EST MOD 30 MIN: CPT

## 2024-04-22 PROCEDURE — G2211 COMPLEX E/M VISIT ADD ON: CPT

## 2024-04-22 RX ORDER — SEMAGLUTIDE 1 MG/.5ML
1 INJECTION, SOLUTION SUBCUTANEOUS
Qty: 1 | Refills: 3 | Status: DISCONTINUED | COMMUNITY
Start: 2023-08-16 | End: 2024-04-22

## 2024-04-22 RX ORDER — PANTOPRAZOLE 40 MG/1
40 TABLET, DELAYED RELEASE ORAL
Qty: 90 | Refills: 2 | Status: ACTIVE | COMMUNITY
Start: 2021-01-14 | End: 1900-01-01

## 2024-04-22 NOTE — HISTORY OF PRESENT ILLNESS
[FreeTextEntry1] :  f/u  [de-identified] :  obesity - weight plateaued on the 1mg wegovy    s/p laprascopic total hysterectomy and BLSO  2 weeks postop develiped painless b/l LE edema - had US doppler (-) DVT with gyn  elevated legs at night with some improvement  has not used compression stockings - plan to buy them today

## 2024-04-22 NOTE — PHYSICAL EXAM
[No Acute Distress] : no acute distress [Well Nourished] : well nourished [No Accessory Muscle Use] : no accessory muscle use [Clear to Auscultation] : lungs were clear to auscultation bilaterally [Regular Rhythm] : with a regular rhythm [Normal S1, S2] : normal S1 and S2 [No Murmur] : no murmur heard [Soft] : abdomen soft [Non Tender] : non-tender [Non-distended] : non-distended [No Masses] : no abdominal mass palpated [Normal Bowel Sounds] : normal bowel sounds [Normal Inguinal Nodes] : no inguinal lymphadenopathy [Normal Femoral Nodes] : no femoral lymphadenopathy [Normal Affect] : the affect was normal [Normal Insight/Judgement] : insight and judgment were intact [de-identified] : trace to 1+ symmetric b/l LE edema

## 2024-05-01 LAB
ALBUMIN SERPL ELPH-MCNC: 4.4 G/DL
ALP BLD-CCNC: 76 U/L
ALT SERPL-CCNC: 23 U/L
ANION GAP SERPL CALC-SCNC: 11 MMOL/L
APPEARANCE: ABNORMAL
AST SERPL-CCNC: 22 U/L
BACTERIA UR CULT: NORMAL
BACTERIA: NEGATIVE /HPF
BASOPHILS # BLD AUTO: 0.05 K/UL
BASOPHILS NFR BLD AUTO: 0.9 %
BILIRUB SERPL-MCNC: 0.4 MG/DL
BILIRUBIN URINE: NEGATIVE
BLOOD URINE: NEGATIVE
BUN SERPL-MCNC: 27 MG/DL
CALCIUM SERPL-MCNC: 9.5 MG/DL
CAST: 0 /LPF
CHLORIDE SERPL-SCNC: 103 MMOL/L
CHOLEST SERPL-MCNC: 273 MG/DL
CO2 SERPL-SCNC: 26 MMOL/L
COLOR: YELLOW
CREAT SERPL-MCNC: 1.05 MG/DL
CREAT SPEC-SCNC: 102 MG/DL
CREAT SPEC-SCNC: 102 MG/DL
CREAT/PROT UR: 0.1 RATIO
EGFR: 60 ML/MIN/1.73M2
EOSINOPHIL # BLD AUTO: 0.29 K/UL
EOSINOPHIL NFR BLD AUTO: 5 %
EPITHELIAL CELLS: 0 /HPF
ESTIMATED AVERAGE GLUCOSE: 100 MG/DL
GLUCOSE QUALITATIVE U: NEGATIVE MG/DL
GLUCOSE SERPL-MCNC: 89 MG/DL
HBA1C MFR BLD HPLC: 5.1 %
HCT VFR BLD CALC: 42.7 %
HDLC SERPL-MCNC: 118 MG/DL
HGB BLD-MCNC: 13.4 G/DL
IMM GRANULOCYTES NFR BLD AUTO: 0.3 %
KETONES URINE: NEGATIVE MG/DL
LDLC SERPL CALC-MCNC: 136 MG/DL
LEUKOCYTE ESTERASE URINE: NEGATIVE
LYMPHOCYTES # BLD AUTO: 1.12 K/UL
LYMPHOCYTES NFR BLD AUTO: 19.5 %
MAN DIFF?: NORMAL
MCHC RBC-ENTMCNC: 28.8 PG
MCHC RBC-ENTMCNC: 31.4 GM/DL
MCV RBC AUTO: 91.8 FL
MICROALBUMIN 24H UR DL<=1MG/L-MCNC: <1.2 MG/DL
MICROALBUMIN/CREAT 24H UR-RTO: NORMAL MG/G
MICROSCOPIC-UA: NORMAL
MONOCYTES # BLD AUTO: 0.43 K/UL
MONOCYTES NFR BLD AUTO: 7.5 %
NEUTROPHILS # BLD AUTO: 3.84 K/UL
NEUTROPHILS NFR BLD AUTO: 66.8 %
NITRITE URINE: NEGATIVE
NONHDLC SERPL-MCNC: 156 MG/DL
NT-PROBNP SERPL-MCNC: 240 PG/ML
PH URINE: 5.5
PLATELET # BLD AUTO: 277 K/UL
POTASSIUM SERPL-SCNC: 4 MMOL/L
PROT SERPL-MCNC: 7.2 G/DL
PROT UR-MCNC: 7 MG/DL
PROTEIN URINE: NEGATIVE MG/DL
RBC # BLD: 4.65 M/UL
RBC # FLD: 12.4 %
RED BLOOD CELLS URINE: 0 /HPF
SODIUM SERPL-SCNC: 140 MMOL/L
SPECIFIC GRAVITY URINE: 1.02
T4 FREE SERPL-MCNC: 1.1 NG/DL
TRIGL SERPL-MCNC: 118 MG/DL
TSH SERPL-ACNC: 0.81 UIU/ML
UROBILINOGEN URINE: 0.2 MG/DL
WBC # FLD AUTO: 5.75 K/UL
WHITE BLOOD CELLS URINE: 0 /HPF

## 2024-05-01 RX ORDER — CHLORTHALIDONE 25 MG/1
25 TABLET ORAL
Qty: 90 | Refills: 1 | Status: ACTIVE | COMMUNITY
Start: 2021-01-14

## 2024-05-01 RX ORDER — AMLODIPINE BESYLATE 10 MG/1
10 TABLET ORAL DAILY
Qty: 45 | Refills: 1 | Status: ACTIVE | COMMUNITY
Start: 2023-04-21

## 2024-05-02 ENCOUNTER — APPOINTMENT (OUTPATIENT)
Dept: GYNECOLOGIC ONCOLOGY | Facility: CLINIC | Age: 64
End: 2024-05-02
Payer: COMMERCIAL

## 2024-05-02 VITALS
DIASTOLIC BLOOD PRESSURE: 74 MMHG | SYSTOLIC BLOOD PRESSURE: 114 MMHG | WEIGHT: 216 LBS | HEART RATE: 96 BPM | BODY MASS INDEX: 35.4 KG/M2

## 2024-05-02 DIAGNOSIS — N84.0 POLYP OF CORPUS UTERI: ICD-10-CM

## 2024-05-02 PROCEDURE — 99024 POSTOP FOLLOW-UP VISIT: CPT

## 2024-05-10 PROBLEM — N84.0 ENDOMETRIAL POLYP: Status: ACTIVE | Noted: 2023-12-12

## 2024-05-10 NOTE — DISCUSSION/SUMMARY
[Clean] : was clean [Dry] : was dry [Intact] : was intact [Erythema] : was not erythematous [Ecchymosis] : was not ecchymotic [Seroma] : had no seroma [Sutures Intact] : had sutures  intact [None] : had no drainage [Normal Skin] : normal appearance [Normal Skin Turgor] : normal skin turgor [Firm] : soft [Tender] : nontender [Rebound] : no rebound tenderness [Guarding] : no guarding [Incisional Hernia] : no incisional hernia [Mass] : no palpable mass [Doing Well] : is doing well [Excellent Pain Control] : has excellent pain control [No Sign of Infection] : is showing no signs of infection [0] : 0 [de-identified] : vaginal cuff intact [de-identified] : /GI function WNL [de-identified] : postoperative recuperation discussed [FreeTextEntry1] : Uterus, cervix, bilateral fallopian tubes and ovaries; hysterectomy and salpingo-oophorectomy: - Leiomyoma. - Adenomyosis.  - Endometrial polyp. - Atrophic endometrium. - Benign ovaries and left fallopian tube.

## 2024-05-10 NOTE — REASON FOR VISIT
[Post Op] : post op visit [de-identified] : 3/2024  [de-identified] : Robotic-assisted total laparoscopic hysterectomy, bilateral salpingo-oophorectomy, and lysis of adhesions. [de-identified] : Normal bowel function. Normal bladder function. No vaginal bleeding or malodorous discharge. No fevers/chills.  Incisions without concern. Recovering well.  Bilateral lower extremity edema.  Doppler negative.  Followed up with PCP, and started on a diuretic.

## 2024-06-03 ENCOUNTER — NON-APPOINTMENT (OUTPATIENT)
Age: 64
End: 2024-06-03

## 2024-06-03 ENCOUNTER — APPOINTMENT (OUTPATIENT)
Dept: INTERNAL MEDICINE | Facility: CLINIC | Age: 64
End: 2024-06-03
Payer: COMMERCIAL

## 2024-06-03 VITALS
BODY MASS INDEX: 34.9 KG/M2 | HEIGHT: 65.5 IN | WEIGHT: 212 LBS | DIASTOLIC BLOOD PRESSURE: 80 MMHG | SYSTOLIC BLOOD PRESSURE: 110 MMHG

## 2024-06-03 DIAGNOSIS — T14.8XXA OTHER INJURY OF UNSPECIFIED BODY REGION, INITIAL ENCOUNTER: ICD-10-CM

## 2024-06-03 DIAGNOSIS — I10 ESSENTIAL (PRIMARY) HYPERTENSION: ICD-10-CM

## 2024-06-03 DIAGNOSIS — M79.89 OTHER SPECIFIED SOFT TISSUE DISORDERS: ICD-10-CM

## 2024-06-03 DIAGNOSIS — Z00.00 ENCOUNTER FOR GENERAL ADULT MEDICAL EXAMINATION W/OUT ABNORMAL FINDINGS: ICD-10-CM

## 2024-06-03 DIAGNOSIS — Z87.898 PERSONAL HISTORY OF OTHER SPECIFIED CONDITIONS: ICD-10-CM

## 2024-06-03 DIAGNOSIS — E66.9 OBESITY, UNSPECIFIED: ICD-10-CM

## 2024-06-03 PROCEDURE — 99214 OFFICE O/P EST MOD 30 MIN: CPT | Mod: 25

## 2024-06-03 PROCEDURE — 99396 PREV VISIT EST AGE 40-64: CPT

## 2024-06-03 PROCEDURE — 93000 ELECTROCARDIOGRAM COMPLETE: CPT

## 2024-06-03 RX ORDER — CYCLOBENZAPRINE HYDROCHLORIDE 10 MG/1
10 TABLET, FILM COATED ORAL EVERY 8 HOURS
Qty: 1 | Refills: 0 | Status: DISCONTINUED | COMMUNITY
Start: 2023-08-16 | End: 2024-06-03

## 2024-06-03 NOTE — REVIEW OF SYSTEMS
[Muscle Weakness] : no muscle weakness [Back Pain] : no back pain [Negative] : Neurological [FreeTextEntry9] : +varicose veins a/w leg soreness

## 2024-06-03 NOTE — HISTORY OF PRESENT ILLNESS
[FreeTextEntry1] :  CPE  [de-identified] : Diet: good  Exercises; walking every day   LE swelling - better with still intermittently painful - had negative dopplers of LE - history of varicose veins   of note - self d/c'd amlodipine 3 weeks ago - LE swelling better - states home BP is good   SKIN - also noticed on volar aspect of R. wrist after insertion of IV during surgeyr ~2 months ago , slightly tender area to palpation ?bruise   Obesity - tolerating wegovy 1.7mg weekly - lost addition 4 lbs - took 5 doses

## 2024-06-03 NOTE — PHYSICAL EXAM
[No Acute Distress] : no acute distress [Well Nourished] : well nourished [PERRL] : pupils equal round and reactive to light [Normal Oropharynx] : the oropharynx was normal [Normal TMs] : both tympanic membranes were normal [Normal Nasal Mucosa] : the nasal mucosa was normal [No Lymphadenopathy] : no lymphadenopathy [Supple] : supple [Thyroid Normal, No Nodules] : the thyroid was normal and there were no nodules present [No Accessory Muscle Use] : no accessory muscle use [Clear to Auscultation] : lungs were clear to auscultation bilaterally [Regular Rhythm] : with a regular rhythm [Normal S1, S2] : normal S1 and S2 [No Murmur] : no murmur heard [No Edema] : there was no peripheral edema [Soft] : abdomen soft [Non Tender] : non-tender [Non-distended] : non-distended [No Masses] : no abdominal mass palpated [No HSM] : no HSM [Normal Bowel Sounds] : normal bowel sounds [Normal Supraclavicular Nodes] : no supraclavicular lymphadenopathy [Normal Posterior Cervical Nodes] : no posterior cervical lymphadenopathy [Normal Anterior Cervical Nodes] : no anterior cervical lymphadenopathy [No Spinal Tenderness] : no spinal tenderness [Normal Affect] : the affect was normal [Normal Insight/Judgement] : insight and judgment were intact [de-identified] : deferred to gyn - has upcoming appointment [de-identified] : +b/l varicose veins [de-identified] : volar aspect of R. wrist with 3 x 2cm area of induration likely bruise; normal color and pigmentation of nevi

## 2024-06-03 NOTE — HEALTH RISK ASSESSMENT
[Patient reported mammogram was normal] : Patient reported mammogram was normal [Patient reported PAP Smear was normal] : Patient reported PAP Smear was normal [Patient reported colonoscopy was normal] : Patient reported colonoscopy was normal [MammogramDate] : 7/23 [PapSmearDate] : 12/23 [ColonoscopyDate] : 10/23

## 2024-06-03 NOTE — ASSESSMENT
[FreeTextEntry1] :  62F c HTN, GERD, obesity s/p gastric bypass, PRINCESS, BRAULIO, s/p robotic-assisted laparoscopic total hysterectomy ,BLSO   leiomyoma& adenomyosis w/GURPREET on 3/2024 here for cpe & HTN, LE edema 2/2 varicose veins, amlodipine, obesity treatment    [facility]   ekg NSR - no acute changes from 1/16/20  due for optho exam & UTD with  dental exam   UTD with screening mammogram  due to see GYN  advised f/u with pulm for BRAULIO - not using bipap  UTD with colonoscopy 7/22 -repeat in 2028  rtc in 6 weeks for a f/u

## 2024-06-17 RX ORDER — SEMAGLUTIDE 2.4 MG/.75ML
2.4 INJECTION, SOLUTION SUBCUTANEOUS
Qty: 1 | Refills: 1 | Status: ACTIVE | COMMUNITY
Start: 2024-04-22 | End: 1900-01-01

## 2024-06-26 ENCOUNTER — APPOINTMENT (OUTPATIENT)
Dept: OBGYN | Facility: CLINIC | Age: 64
End: 2024-06-26

## 2024-07-06 ENCOUNTER — RX RENEWAL (OUTPATIENT)
Age: 64
End: 2024-07-06

## 2024-07-22 ENCOUNTER — LABORATORY RESULT (OUTPATIENT)
Age: 64
End: 2024-07-22

## 2024-07-22 ENCOUNTER — APPOINTMENT (OUTPATIENT)
Dept: INTERNAL MEDICINE | Facility: CLINIC | Age: 64
End: 2024-07-22
Payer: COMMERCIAL

## 2024-07-22 VITALS — DIASTOLIC BLOOD PRESSURE: 72 MMHG | SYSTOLIC BLOOD PRESSURE: 126 MMHG

## 2024-07-22 VITALS — SYSTOLIC BLOOD PRESSURE: 126 MMHG | DIASTOLIC BLOOD PRESSURE: 70 MMHG

## 2024-07-22 DIAGNOSIS — M79.89 OTHER SPECIFIED SOFT TISSUE DISORDERS: ICD-10-CM

## 2024-07-22 DIAGNOSIS — T14.8XXA OTHER INJURY OF UNSPECIFIED BODY REGION, INITIAL ENCOUNTER: ICD-10-CM

## 2024-07-22 DIAGNOSIS — Z01.818 ENCOUNTER FOR OTHER PREPROCEDURAL EXAMINATION: ICD-10-CM

## 2024-07-22 DIAGNOSIS — E66.9 OBESITY, UNSPECIFIED: ICD-10-CM

## 2024-07-22 DIAGNOSIS — I10 ESSENTIAL (PRIMARY) HYPERTENSION: ICD-10-CM

## 2024-07-22 DIAGNOSIS — R51.9 HEADACHE, UNSPECIFIED: ICD-10-CM

## 2024-07-22 PROCEDURE — G2211 COMPLEX E/M VISIT ADD ON: CPT | Mod: NC,1L

## 2024-07-22 PROCEDURE — 36415 COLL VENOUS BLD VENIPUNCTURE: CPT

## 2024-07-22 PROCEDURE — 99214 OFFICE O/P EST MOD 30 MIN: CPT

## 2024-07-22 NOTE — HISTORY OF PRESENT ILLNESS
[FreeTextEntry1] :  HA [de-identified] : HA x 2 weeks - sharp, also behind eyes - 4/10 maximum - no relief with tylenol or advil prn  lasts 1/2 hours denies any ENT issues has not had recent opthamology exam  no vision loss   Obesity - on 6th dose of wegovy 2.4mg weekly - tolerating well - continuing to lose weight - staying hyrated  HTN - on ARB & chlorthalidone - off amlodipine due to LE swelling  states saw vascular doctor & tests wnl  plans ot see orthopedist for b/l leg pain

## 2024-07-23 LAB
ALBUMIN SERPL ELPH-MCNC: 4.3 G/DL
ALP BLD-CCNC: 73 U/L
ALT SERPL-CCNC: 15 U/L
ANION GAP SERPL CALC-SCNC: 13 MMOL/L
APPEARANCE: CLEAR
AST SERPL-CCNC: 16 U/L
BASOPHILS # BLD AUTO: 0.03 K/UL
BASOPHILS NFR BLD AUTO: 0.6 %
BILIRUB SERPL-MCNC: 0.3 MG/DL
BILIRUBIN URINE: NEGATIVE
BLOOD URINE: NEGATIVE
BUN SERPL-MCNC: 26 MG/DL
CALCIUM SERPL-MCNC: 9.5 MG/DL
CHLORIDE SERPL-SCNC: 102 MMOL/L
CHOLEST SERPL-MCNC: 250 MG/DL
CO2 SERPL-SCNC: 28 MMOL/L
COLOR: YELLOW
CREAT SERPL-MCNC: 1.58 MG/DL
CREAT SPEC-SCNC: 125 MG/DL
CRP SERPL-MCNC: <3 MG/L
EGFR: 37 ML/MIN/1.73M2
EOSINOPHIL # BLD AUTO: 0.18 K/UL
EOSINOPHIL NFR BLD AUTO: 3.7 %
ERYTHROCYTE [SEDIMENTATION RATE] IN BLOOD BY WESTERGREN METHOD: 8 MM/HR
ESTIMATED AVERAGE GLUCOSE: 91 MG/DL
FERRITIN SERPL-MCNC: 149 NG/ML
FOLATE SERPL-MCNC: 3.5 NG/ML
GLUCOSE QUALITATIVE U: NEGATIVE MG/DL
GLUCOSE SERPL-MCNC: 76 MG/DL
HBA1C MFR BLD HPLC: 4.8 %
HCT VFR BLD CALC: 42.7 %
HDLC SERPL-MCNC: 117 MG/DL
HGB BLD-MCNC: 13.3 G/DL
IMM GRANULOCYTES NFR BLD AUTO: 0 %
IRON SATN MFR SERPL: 25 %
IRON SERPL-MCNC: 96 UG/DL
KETONES URINE: NEGATIVE MG/DL
LDLC SERPL CALC-MCNC: 118 MG/DL
LEUKOCYTE ESTERASE URINE: ABNORMAL
LYMPHOCYTES # BLD AUTO: 1.1 K/UL
LYMPHOCYTES NFR BLD AUTO: 22.9 %
MAN DIFF?: NORMAL
MCHC RBC-ENTMCNC: 29.2 PG
MCHC RBC-ENTMCNC: 31.1 GM/DL
MCV RBC AUTO: 93.8 FL
MICROALBUMIN 24H UR DL<=1MG/L-MCNC: <1.2 MG/DL
MICROALBUMIN/CREAT 24H UR-RTO: NORMAL MG/G
MONOCYTES # BLD AUTO: 0.38 K/UL
MONOCYTES NFR BLD AUTO: 7.9 %
NEUTROPHILS # BLD AUTO: 3.12 K/UL
NEUTROPHILS NFR BLD AUTO: 64.9 %
NITRITE URINE: NEGATIVE
NONHDLC SERPL-MCNC: 133 MG/DL
PH URINE: 6
PLATELET # BLD AUTO: 277 K/UL
POTASSIUM SERPL-SCNC: 4.8 MMOL/L
PROT SERPL-MCNC: 7 G/DL
PROTEIN URINE: NEGATIVE MG/DL
RBC # BLD: 4.55 M/UL
RBC # FLD: 13.5 %
SODIUM SERPL-SCNC: 142 MMOL/L
SPECIFIC GRAVITY URINE: 1.02
TIBC SERPL-MCNC: 388 UG/DL
TRIGL SERPL-MCNC: 91 MG/DL
TSH SERPL-ACNC: 0.73 UIU/ML
UIBC SERPL-MCNC: 292 UG/DL
UROBILINOGEN URINE: 1 MG/DL
VIT B12 SERPL-MCNC: 1056 PG/ML
WBC # FLD AUTO: 4.81 K/UL

## 2024-07-23 NOTE — ED PROVIDER NOTE - DISCHARGE DATE
Problem: Adult Inpatient Plan of Care  Goal: Plan of Care Review  Outcome: Progressing  Goal: Patient-Specific Goal (Individualized)  Outcome: Progressing  Goal: Absence of Hospital-Acquired Illness or Injury  Outcome: Progressing  Goal: Optimal Comfort and Wellbeing  Outcome: Progressing  Goal: Readiness for Transition of Care  Outcome: Progressing      09-Jul-2019

## 2024-07-29 ENCOUNTER — APPOINTMENT (OUTPATIENT)
Dept: INTERNAL MEDICINE | Facility: CLINIC | Age: 64
End: 2024-07-29

## 2024-08-09 PROBLEM — E53.8 FOLIC ACID DEFICIENCY: Status: ACTIVE | Noted: 2024-08-09

## 2024-08-12 ENCOUNTER — RX RENEWAL (OUTPATIENT)
Age: 64
End: 2024-08-12

## 2024-08-13 ENCOUNTER — APPOINTMENT (OUTPATIENT)
Dept: INTERNAL MEDICINE | Facility: CLINIC | Age: 64
End: 2024-08-13
Payer: COMMERCIAL

## 2024-08-13 DIAGNOSIS — I10 ESSENTIAL (PRIMARY) HYPERTENSION: ICD-10-CM

## 2024-08-13 PROCEDURE — 36415 COLL VENOUS BLD VENIPUNCTURE: CPT

## 2024-08-15 ENCOUNTER — APPOINTMENT (OUTPATIENT)
Dept: NEUROLOGY | Facility: CLINIC | Age: 64
End: 2024-08-15

## 2024-08-23 ENCOUNTER — APPOINTMENT (OUTPATIENT)
Dept: ULTRASOUND IMAGING | Facility: CLINIC | Age: 64
End: 2024-08-23
Payer: COMMERCIAL

## 2024-08-23 ENCOUNTER — OUTPATIENT (OUTPATIENT)
Dept: OUTPATIENT SERVICES | Facility: HOSPITAL | Age: 64
LOS: 1 days | End: 2024-08-23
Payer: COMMERCIAL

## 2024-08-23 ENCOUNTER — RX RENEWAL (OUTPATIENT)
Age: 64
End: 2024-08-23

## 2024-08-23 DIAGNOSIS — Z90.3 ACQUIRED ABSENCE OF STOMACH [PART OF]: Chronic | ICD-10-CM

## 2024-08-23 DIAGNOSIS — I10 ESSENTIAL (PRIMARY) HYPERTENSION: ICD-10-CM

## 2024-08-23 DIAGNOSIS — Z98.890 OTHER SPECIFIED POSTPROCEDURAL STATES: Chronic | ICD-10-CM

## 2024-08-23 DIAGNOSIS — Z87.59 PERSONAL HISTORY OF OTHER COMPLICATIONS OF PREGNANCY, CHILDBIRTH AND THE PUERPERIUM: Chronic | ICD-10-CM

## 2024-08-23 DIAGNOSIS — Z98.891 HISTORY OF UTERINE SCAR FROM PREVIOUS SURGERY: Chronic | ICD-10-CM

## 2024-08-23 PROCEDURE — 93975 VASCULAR STUDY: CPT | Mod: 26

## 2024-08-23 PROCEDURE — 76700 US EXAM ABDOM COMPLETE: CPT | Mod: 26

## 2024-08-23 PROCEDURE — 76700 US EXAM ABDOM COMPLETE: CPT

## 2024-08-23 PROCEDURE — 93975 VASCULAR STUDY: CPT

## 2024-08-23 PROCEDURE — 76700 US EXAM ABDOM COMPLETE: CPT | Mod: 26,59

## 2024-09-18 VITALS — BODY MASS INDEX: 33.42 KG/M2 | WEIGHT: 203 LBS | HEIGHT: 65.5 IN

## 2024-09-18 DIAGNOSIS — R93.2 ABNORMAL FINDINGS ON DIAGNOSTIC IMAGING OF LIVER AND BILIARY TRACT: ICD-10-CM

## 2024-09-19 RX ORDER — TIRZEPATIDE 5 MG/.5ML
5 INJECTION, SOLUTION SUBCUTANEOUS
Qty: 4 | Refills: 2 | Status: ACTIVE | COMMUNITY
Start: 2024-09-18

## 2024-09-27 ENCOUNTER — RX RENEWAL (OUTPATIENT)
Age: 64
End: 2024-09-27

## 2024-10-01 ENCOUNTER — APPOINTMENT (OUTPATIENT)
Dept: OBGYN | Facility: CLINIC | Age: 64
End: 2024-10-01
Payer: COMMERCIAL

## 2024-10-01 ENCOUNTER — APPOINTMENT (OUTPATIENT)
Dept: INTERNAL MEDICINE | Facility: CLINIC | Age: 64
End: 2024-10-01
Payer: COMMERCIAL

## 2024-10-01 VITALS
HEIGHT: 65.5 IN | SYSTOLIC BLOOD PRESSURE: 120 MMHG | DIASTOLIC BLOOD PRESSURE: 70 MMHG | WEIGHT: 198 LBS | BODY MASS INDEX: 32.59 KG/M2

## 2024-10-01 VITALS
DIASTOLIC BLOOD PRESSURE: 78 MMHG | HEIGHT: 65 IN | HEART RATE: 76 BPM | WEIGHT: 204 LBS | BODY MASS INDEX: 33.99 KG/M2 | SYSTOLIC BLOOD PRESSURE: 114 MMHG

## 2024-10-01 DIAGNOSIS — Z87.898 PERSONAL HISTORY OF OTHER SPECIFIED CONDITIONS: ICD-10-CM

## 2024-10-01 DIAGNOSIS — T18.4XXA FOREIGN BODY IN COLON, INITIAL ENCOUNTER: ICD-10-CM

## 2024-10-01 DIAGNOSIS — T18.4XXS: ICD-10-CM

## 2024-10-01 DIAGNOSIS — Z01.419 ENCOUNTER FOR GYNECOLOGICAL EXAMINATION (GENERAL) (ROUTINE) W/OUT ABNORMAL FINDINGS: ICD-10-CM

## 2024-10-01 DIAGNOSIS — Z92.29 PERSONAL HISTORY OF OTHER DRUG THERAPY: ICD-10-CM

## 2024-10-01 DIAGNOSIS — R92.30 DENSE BREASTS, UNSPECIFIED: ICD-10-CM

## 2024-10-01 DIAGNOSIS — N84.0 POLYP OF CORPUS UTERI: ICD-10-CM

## 2024-10-01 DIAGNOSIS — Z23 ENCOUNTER FOR IMMUNIZATION: ICD-10-CM

## 2024-10-01 DIAGNOSIS — T14.8XXA OTHER INJURY OF UNSPECIFIED BODY REGION, INITIAL ENCOUNTER: ICD-10-CM

## 2024-10-01 DIAGNOSIS — Z87.42 PERSONAL HISTORY OF OTHER DISEASES OF THE FEMALE GENITAL TRACT: ICD-10-CM

## 2024-10-01 DIAGNOSIS — I10 ESSENTIAL (PRIMARY) HYPERTENSION: ICD-10-CM

## 2024-10-01 DIAGNOSIS — N95.9 UNSPECIFIED MENOPAUSAL AND PERIMENOPAUSAL DISORDER: ICD-10-CM

## 2024-10-01 DIAGNOSIS — M79.89 OTHER SPECIFIED SOFT TISSUE DISORDERS: ICD-10-CM

## 2024-10-01 DIAGNOSIS — E66.9 OBESITY, UNSPECIFIED: ICD-10-CM

## 2024-10-01 DIAGNOSIS — Z83.3 FAMILY HISTORY OF DIABETES MELLITUS: ICD-10-CM

## 2024-10-01 PROCEDURE — 99396 PREV VISIT EST AGE 40-64: CPT

## 2024-10-01 PROCEDURE — G0008: CPT

## 2024-10-01 PROCEDURE — 99213 OFFICE O/P EST LOW 20 MIN: CPT | Mod: 25

## 2024-10-01 PROCEDURE — 90656 IIV3 VACC NO PRSV 0.5 ML IM: CPT

## 2024-10-01 NOTE — HISTORY OF PRESENT ILLNESS
[FreeTextEntry1] :  obesity   HTN  [de-identified] :  obesity - last dose of wegovy 1.7mg weekly last week 9/26/24 just received zepbound 5mg which she didn't start yet  HTN - on 1 tablet clorthalidone and losartan

## 2024-10-01 NOTE — PLAN
[FreeTextEntry1] : Screening breast mammogram order in hand Screening bone dexa scan order in hand  Lifestyle Medicine handouts regarding whole food plant-based diet provided RTO prn or for annual  in one year

## 2024-10-01 NOTE — PHYSICAL EXAM
[Chaperone Present] : A chaperone was present in the examining room during all aspects of the physical examination [Appropriately responsive] : appropriately responsive [Alert] : alert [No Acute Distress] : no acute distress [Soft] : soft [Non-tender] : non-tender [No Lesions] : no lesions [Oriented x3] : oriented x3 [Examination Of The Breasts] : a normal appearance [No Discharge] : no discharge [No Masses] : no breast masses were palpable [Labia Majora] : normal [Labia Minora] : normal [Normal] : normal [No Bleeding] : There was no active vaginal bleeding [Absent] : absent [Uterine Adnexae] : absent [FreeTextEntry7] : rotund with scarring

## 2024-10-01 NOTE — HISTORY OF PRESENT ILLNESS
[FreeTextEntry1] : This 63 year old P6  LMP ~ age 53 years,  presents for annual. Robotic- assisted total laparoscopic hysterectomy and bilateral salpingo-oophorectomy completed for PMB and thickened endometrium back in March 2024- benign pathology, denies any vaginal bleeding, SA with , not bothered by vasomotor symptoms or vaginal dryness, voiding and stooling without change in pattern, medical hx updated, no change to surgical hx, family hx updated. [Mammogramdate] : July 2023 [TextBox_19] : BIRAD 1 [BreastSonogramDate] : July 2023 [PapSmeardate] : Dec 2023 [TextBox_31] : neg pap and HPV [BoneDensityDate] : Feb 2018 [TextBox_37] : WNL [ColonoscopyDate] : Oct 2023 [TextBox_43] : repeat 5 years- see note

## 2024-10-15 ENCOUNTER — RESULT REVIEW (OUTPATIENT)
Age: 64
End: 2024-10-15

## 2024-10-15 ENCOUNTER — OUTPATIENT (OUTPATIENT)
Dept: OUTPATIENT SERVICES | Facility: HOSPITAL | Age: 64
LOS: 1 days | End: 2024-10-15
Payer: COMMERCIAL

## 2024-10-15 ENCOUNTER — APPOINTMENT (OUTPATIENT)
Dept: RADIOLOGY | Facility: IMAGING CENTER | Age: 64
End: 2024-10-15
Payer: COMMERCIAL

## 2024-10-15 ENCOUNTER — APPOINTMENT (OUTPATIENT)
Dept: MAMMOGRAPHY | Facility: IMAGING CENTER | Age: 64
End: 2024-10-15
Payer: COMMERCIAL

## 2024-10-15 DIAGNOSIS — Z98.891 HISTORY OF UTERINE SCAR FROM PREVIOUS SURGERY: Chronic | ICD-10-CM

## 2024-10-15 DIAGNOSIS — Z98.890 OTHER SPECIFIED POSTPROCEDURAL STATES: Chronic | ICD-10-CM

## 2024-10-15 DIAGNOSIS — Z87.59 PERSONAL HISTORY OF OTHER COMPLICATIONS OF PREGNANCY, CHILDBIRTH AND THE PUERPERIUM: Chronic | ICD-10-CM

## 2024-10-15 DIAGNOSIS — Z01.419 ENCOUNTER FOR GYNECOLOGICAL EXAMINATION (GENERAL) (ROUTINE) WITHOUT ABNORMAL FINDINGS: ICD-10-CM

## 2024-10-15 DIAGNOSIS — Z00.8 ENCOUNTER FOR OTHER GENERAL EXAMINATION: ICD-10-CM

## 2024-10-15 PROCEDURE — 77063 BREAST TOMOSYNTHESIS BI: CPT | Mod: 26

## 2024-10-15 PROCEDURE — 77080 DXA BONE DENSITY AXIAL: CPT | Mod: 26

## 2024-10-15 PROCEDURE — 77067 SCR MAMMO BI INCL CAD: CPT | Mod: 26

## 2024-10-15 PROCEDURE — 77080 DXA BONE DENSITY AXIAL: CPT

## 2024-10-15 PROCEDURE — 77063 BREAST TOMOSYNTHESIS BI: CPT

## 2024-10-15 PROCEDURE — 77067 SCR MAMMO BI INCL CAD: CPT

## 2024-10-28 ENCOUNTER — APPOINTMENT (OUTPATIENT)
Dept: INTERNAL MEDICINE | Facility: CLINIC | Age: 64
End: 2024-10-28
Payer: COMMERCIAL

## 2024-10-28 ENCOUNTER — LABORATORY RESULT (OUTPATIENT)
Age: 64
End: 2024-10-28

## 2024-10-28 VITALS
SYSTOLIC BLOOD PRESSURE: 120 MMHG | BODY MASS INDEX: 33.54 KG/M2 | DIASTOLIC BLOOD PRESSURE: 70 MMHG | HEIGHT: 65 IN | WEIGHT: 201.31 LBS

## 2024-10-28 DIAGNOSIS — I10 ESSENTIAL (PRIMARY) HYPERTENSION: ICD-10-CM

## 2024-10-28 DIAGNOSIS — E66.9 OBESITY, UNSPECIFIED: ICD-10-CM

## 2024-10-28 DIAGNOSIS — E53.8 DEFICIENCY OF OTHER SPECIFIED B GROUP VITAMINS: ICD-10-CM

## 2024-10-28 PROCEDURE — 36415 COLL VENOUS BLD VENIPUNCTURE: CPT

## 2024-10-28 PROCEDURE — G2211 COMPLEX E/M VISIT ADD ON: CPT | Mod: NC

## 2024-10-28 PROCEDURE — 99214 OFFICE O/P EST MOD 30 MIN: CPT

## 2024-10-28 RX ORDER — TIRZEPATIDE 7.5 MG/.5ML
7.5 INJECTION, SOLUTION SUBCUTANEOUS
Qty: 1 | Refills: 1 | Status: ACTIVE | COMMUNITY
Start: 2024-10-28 | End: 1900-01-01

## 2024-10-28 RX ORDER — SEMAGLUTIDE 1.7 MG/.75ML
1.7 INJECTION, SOLUTION SUBCUTANEOUS
Qty: 3 | Refills: 0 | Status: COMPLETED | COMMUNITY
Start: 2024-05-20

## 2024-10-30 ENCOUNTER — TRANSCRIPTION ENCOUNTER (OUTPATIENT)
Age: 64
End: 2024-10-30

## 2024-10-30 LAB
ALBUMIN SERPL ELPH-MCNC: 4.3 G/DL
ALP BLD-CCNC: 70 U/L
ALT SERPL-CCNC: 18 U/L
ANION GAP SERPL CALC-SCNC: 12 MMOL/L
APPEARANCE: ABNORMAL
AST SERPL-CCNC: 22 U/L
BASOPHILS # BLD AUTO: 0.03 K/UL
BASOPHILS NFR BLD AUTO: 0.6 %
BILIRUB SERPL-MCNC: 0.6 MG/DL
BILIRUBIN URINE: NEGATIVE
BLOOD URINE: NEGATIVE
BUN SERPL-MCNC: 23 MG/DL
CALCIUM SERPL-MCNC: 9.8 MG/DL
CHLORIDE SERPL-SCNC: 103 MMOL/L
CHOLEST SERPL-MCNC: 272 MG/DL
CO2 SERPL-SCNC: 26 MMOL/L
COLOR: YELLOW
CREAT SERPL-MCNC: 1.28 MG/DL
EGFR: 47 ML/MIN/1.73M2
EOSINOPHIL # BLD AUTO: 0.12 K/UL
EOSINOPHIL NFR BLD AUTO: 2.2 %
ESTIMATED AVERAGE GLUCOSE: 85 MG/DL
FOLATE SERPL-MCNC: >20 NG/ML
GLUCOSE QUALITATIVE U: NEGATIVE MG/DL
GLUCOSE SERPL-MCNC: 92 MG/DL
HBA1C MFR BLD HPLC: 4.6 %
HCT VFR BLD CALC: 43.9 %
HDLC SERPL-MCNC: 124 MG/DL
HGB BLD-MCNC: 13 G/DL
IMM GRANULOCYTES NFR BLD AUTO: 0.2 %
KETONES URINE: NEGATIVE MG/DL
LDLC SERPL CALC-MCNC: 138 MG/DL
LEUKOCYTE ESTERASE URINE: NEGATIVE
LYMPHOCYTES # BLD AUTO: 1.44 K/UL
LYMPHOCYTES NFR BLD AUTO: 26.8 %
MAN DIFF?: NORMAL
MCHC RBC-ENTMCNC: 28.7 PG
MCHC RBC-ENTMCNC: 29.6 GM/DL
MCV RBC AUTO: 96.9 FL
MONOCYTES # BLD AUTO: 0.41 K/UL
MONOCYTES NFR BLD AUTO: 7.6 %
NEUTROPHILS # BLD AUTO: 3.36 K/UL
NEUTROPHILS NFR BLD AUTO: 62.6 %
NITRITE URINE: NEGATIVE
NONHDLC SERPL-MCNC: 148 MG/DL
PH URINE: 6
PLATELET # BLD AUTO: 265 K/UL
POTASSIUM SERPL-SCNC: 4.6 MMOL/L
PROT SERPL-MCNC: 7.1 G/DL
PROTEIN URINE: NEGATIVE MG/DL
RBC # BLD: 4.53 M/UL
RBC # FLD: 13.2 %
SODIUM SERPL-SCNC: 142 MMOL/L
SPECIFIC GRAVITY URINE: 1.02
T4 FREE SERPL-MCNC: 1.1 NG/DL
TRIGL SERPL-MCNC: 66 MG/DL
TSH SERPL-ACNC: 0.94 UIU/ML
UROBILINOGEN URINE: 0.2 MG/DL
WBC # FLD AUTO: 5.37 K/UL

## 2024-11-25 ENCOUNTER — APPOINTMENT (OUTPATIENT)
Dept: INTERNAL MEDICINE | Facility: CLINIC | Age: 64
End: 2024-11-25
Payer: COMMERCIAL

## 2024-11-25 VITALS
WEIGHT: 200 LBS | DIASTOLIC BLOOD PRESSURE: 78 MMHG | SYSTOLIC BLOOD PRESSURE: 120 MMHG | BODY MASS INDEX: 33.32 KG/M2 | HEIGHT: 65 IN

## 2024-11-25 DIAGNOSIS — E66.9 OBESITY, UNSPECIFIED: ICD-10-CM

## 2024-11-25 PROCEDURE — 99214 OFFICE O/P EST MOD 30 MIN: CPT

## 2024-11-25 PROCEDURE — G2211 COMPLEX E/M VISIT ADD ON: CPT | Mod: NC

## 2024-11-25 RX ORDER — TIRZEPATIDE 10 MG/.5ML
10 INJECTION, SOLUTION SUBCUTANEOUS
Qty: 1 | Refills: 1 | Status: ACTIVE | COMMUNITY
Start: 2024-11-25 | End: 1900-01-01

## 2024-11-25 NOTE — PRE-OP CHECKLIST - WARM FLUIDS/WARM BLANKETS
Chief Complaint   Patient presents with    Labs Only     PIV placed vitals checked     Dodie Gibbs RN on 11/25/2024 at 8:33 AM    
no
no

## 2024-12-30 ENCOUNTER — APPOINTMENT (OUTPATIENT)
Dept: INTERNAL MEDICINE | Facility: CLINIC | Age: 64
End: 2024-12-30

## 2025-01-27 ENCOUNTER — LABORATORY RESULT (OUTPATIENT)
Age: 65
End: 2025-01-27

## 2025-01-27 ENCOUNTER — APPOINTMENT (OUTPATIENT)
Dept: INTERNAL MEDICINE | Facility: CLINIC | Age: 65
End: 2025-01-27
Payer: COMMERCIAL

## 2025-01-27 VITALS
DIASTOLIC BLOOD PRESSURE: 80 MMHG | WEIGHT: 198 LBS | SYSTOLIC BLOOD PRESSURE: 110 MMHG | BODY MASS INDEX: 32.99 KG/M2 | HEIGHT: 65 IN

## 2025-01-27 DIAGNOSIS — E66.9 OBESITY, UNSPECIFIED: ICD-10-CM

## 2025-01-27 DIAGNOSIS — G47.33 OBSTRUCTIVE SLEEP APNEA (ADULT) (PEDIATRIC): ICD-10-CM

## 2025-01-27 DIAGNOSIS — I10 ESSENTIAL (PRIMARY) HYPERTENSION: ICD-10-CM

## 2025-01-27 LAB
ALBUMIN SERPL ELPH-MCNC: 4.4 G/DL
ALP BLD-CCNC: 76 U/L
ALT SERPL-CCNC: 19 U/L
ANION GAP SERPL CALC-SCNC: 13 MMOL/L
APPEARANCE: ABNORMAL
AST SERPL-CCNC: 33 U/L
BASOPHILS # BLD AUTO: 0.04 K/UL
BASOPHILS NFR BLD AUTO: 0.7 %
BILIRUB SERPL-MCNC: 0.6 MG/DL
BILIRUBIN URINE: NEGATIVE
BLOOD URINE: NEGATIVE
BUN SERPL-MCNC: 26 MG/DL
CALCIUM SERPL-MCNC: 9.5 MG/DL
CHLORIDE SERPL-SCNC: 101 MMOL/L
CHOLEST SERPL-MCNC: 278 MG/DL
CO2 SERPL-SCNC: 26 MMOL/L
COLOR: YELLOW
CREAT SERPL-MCNC: 1.22 MG/DL
EGFR: 50 ML/MIN/1.73M2
EOSINOPHIL # BLD AUTO: 0.42 K/UL
EOSINOPHIL NFR BLD AUTO: 7.1 %
ESTIMATED AVERAGE GLUCOSE: 91 MG/DL
GLUCOSE QUALITATIVE U: NEGATIVE MG/DL
GLUCOSE SERPL-MCNC: 80 MG/DL
HBA1C MFR BLD HPLC: 4.8 %
HCT VFR BLD CALC: 44.3 %
HDLC SERPL-MCNC: 119 MG/DL
HGB BLD-MCNC: 14 G/DL
IMM GRANULOCYTES NFR BLD AUTO: 0.2 %
KETONES URINE: NEGATIVE MG/DL
LDLC SERPL CALC-MCNC: 147 MG/DL
LEUKOCYTE ESTERASE URINE: ABNORMAL
LYMPHOCYTES # BLD AUTO: 1.25 K/UL
LYMPHOCYTES NFR BLD AUTO: 21.1 %
MAN DIFF?: NORMAL
MCHC RBC-ENTMCNC: 29.2 PG
MCHC RBC-ENTMCNC: 31.6 G/DL
MCV RBC AUTO: 92.3 FL
MONOCYTES # BLD AUTO: 0.49 K/UL
MONOCYTES NFR BLD AUTO: 8.3 %
NEUTROPHILS # BLD AUTO: 3.71 K/UL
NEUTROPHILS NFR BLD AUTO: 62.6 %
NITRITE URINE: NEGATIVE
NONHDLC SERPL-MCNC: 159 MG/DL
PH URINE: 6
PLATELET # BLD AUTO: 276 K/UL
POTASSIUM SERPL-SCNC: 5 MMOL/L
PROT SERPL-MCNC: 7.1 G/DL
PROTEIN URINE: NEGATIVE MG/DL
RBC # BLD: 4.8 M/UL
RBC # FLD: 12.8 %
SODIUM SERPL-SCNC: 141 MMOL/L
SPECIFIC GRAVITY URINE: 1.02
TRIGL SERPL-MCNC: 79 MG/DL
TSH SERPL-ACNC: 0.79 UIU/ML
UROBILINOGEN URINE: 0.2 MG/DL
WBC # FLD AUTO: 5.92 K/UL

## 2025-01-27 PROCEDURE — 99214 OFFICE O/P EST MOD 30 MIN: CPT

## 2025-01-27 PROCEDURE — G2211 COMPLEX E/M VISIT ADD ON: CPT | Mod: NC

## 2025-01-27 RX ORDER — TIRZEPATIDE 12.5 MG/.5ML
12.5 INJECTION, SOLUTION SUBCUTANEOUS
Qty: 1 | Refills: 0 | Status: ACTIVE | COMMUNITY
Start: 2025-01-27 | End: 1900-01-01

## 2025-03-10 ENCOUNTER — APPOINTMENT (OUTPATIENT)
Dept: INTERNAL MEDICINE | Facility: CLINIC | Age: 65
End: 2025-03-10
Payer: COMMERCIAL

## 2025-03-10 VITALS — WEIGHT: 198 LBS | BODY MASS INDEX: 32.95 KG/M2 | SYSTOLIC BLOOD PRESSURE: 120 MMHG | DIASTOLIC BLOOD PRESSURE: 70 MMHG

## 2025-03-10 DIAGNOSIS — R93.2 ABNORMAL FINDINGS ON DIAGNOSTIC IMAGING OF LIVER AND BILIARY TRACT: ICD-10-CM

## 2025-03-10 DIAGNOSIS — E66.9 OBESITY, UNSPECIFIED: ICD-10-CM

## 2025-03-10 PROCEDURE — G2211 COMPLEX E/M VISIT ADD ON: CPT | Mod: NC

## 2025-03-10 PROCEDURE — 99214 OFFICE O/P EST MOD 30 MIN: CPT

## 2025-03-10 RX ORDER — TIRZEPATIDE 15 MG/.5ML
15 INJECTION, SOLUTION SUBCUTANEOUS WEEKLY
Qty: 1 | Refills: 1 | Status: ACTIVE | COMMUNITY
Start: 2025-03-10 | End: 1900-01-01

## 2025-03-18 ENCOUNTER — APPOINTMENT (OUTPATIENT)
Dept: ULTRASOUND IMAGING | Facility: CLINIC | Age: 65
End: 2025-03-18
Payer: COMMERCIAL

## 2025-03-18 ENCOUNTER — OUTPATIENT (OUTPATIENT)
Dept: OUTPATIENT SERVICES | Facility: HOSPITAL | Age: 65
LOS: 1 days | End: 2025-03-18
Payer: COMMERCIAL

## 2025-03-18 DIAGNOSIS — Z98.890 OTHER SPECIFIED POSTPROCEDURAL STATES: Chronic | ICD-10-CM

## 2025-03-18 DIAGNOSIS — Z98.891 HISTORY OF UTERINE SCAR FROM PREVIOUS SURGERY: Chronic | ICD-10-CM

## 2025-03-18 DIAGNOSIS — Z90.3 ACQUIRED ABSENCE OF STOMACH [PART OF]: Chronic | ICD-10-CM

## 2025-03-18 DIAGNOSIS — Z87.59 PERSONAL HISTORY OF OTHER COMPLICATIONS OF PREGNANCY, CHILDBIRTH AND THE PUERPERIUM: Chronic | ICD-10-CM

## 2025-03-18 DIAGNOSIS — R93.2 ABNORMAL FINDINGS ON DIAGNOSTIC IMAGING OF LIVER AND BILIARY TRACT: ICD-10-CM

## 2025-03-18 PROCEDURE — 76700 US EXAM ABDOM COMPLETE: CPT

## 2025-03-18 PROCEDURE — 76700 US EXAM ABDOM COMPLETE: CPT | Mod: 26

## 2025-03-24 ENCOUNTER — TRANSCRIPTION ENCOUNTER (OUTPATIENT)
Age: 65
End: 2025-03-24

## 2025-03-24 DIAGNOSIS — R93.2 ABNORMAL FINDINGS ON DIAGNOSTIC IMAGING OF LIVER AND BILIARY TRACT: ICD-10-CM

## 2025-04-21 ENCOUNTER — APPOINTMENT (OUTPATIENT)
Dept: INTERNAL MEDICINE | Facility: CLINIC | Age: 65
End: 2025-04-21
Payer: COMMERCIAL

## 2025-04-21 VITALS
DIASTOLIC BLOOD PRESSURE: 80 MMHG | SYSTOLIC BLOOD PRESSURE: 110 MMHG | HEIGHT: 65 IN | WEIGHT: 196 LBS | BODY MASS INDEX: 32.65 KG/M2

## 2025-04-21 DIAGNOSIS — E66.9 OBESITY, UNSPECIFIED: ICD-10-CM

## 2025-04-21 PROCEDURE — G2211 COMPLEX E/M VISIT ADD ON: CPT | Mod: NC

## 2025-04-21 PROCEDURE — 99213 OFFICE O/P EST LOW 20 MIN: CPT

## 2025-05-12 ENCOUNTER — APPOINTMENT (OUTPATIENT)
Dept: INTERNAL MEDICINE | Facility: CLINIC | Age: 65
End: 2025-05-12

## 2025-05-20 VITALS — WEIGHT: 190 LBS | BODY MASS INDEX: 31.62 KG/M2

## 2025-06-09 ENCOUNTER — LABORATORY RESULT (OUTPATIENT)
Age: 65
End: 2025-06-09

## 2025-06-09 ENCOUNTER — APPOINTMENT (OUTPATIENT)
Dept: INTERNAL MEDICINE | Facility: CLINIC | Age: 65
End: 2025-06-09
Payer: COMMERCIAL

## 2025-06-09 VITALS
WEIGHT: 192 LBS | SYSTOLIC BLOOD PRESSURE: 110 MMHG | DIASTOLIC BLOOD PRESSURE: 80 MMHG | BODY MASS INDEX: 31.99 KG/M2 | HEIGHT: 65 IN

## 2025-06-09 PROCEDURE — G2211 COMPLEX E/M VISIT ADD ON: CPT | Mod: NC

## 2025-06-09 PROCEDURE — 99213 OFFICE O/P EST LOW 20 MIN: CPT

## 2025-06-09 PROCEDURE — 36415 COLL VENOUS BLD VENIPUNCTURE: CPT

## 2025-06-10 PROBLEM — Z28.9 MISSED VACCINATION: Status: ACTIVE | Noted: 2025-06-09

## 2025-06-10 LAB
ALBUMIN SERPL ELPH-MCNC: 4.1 G/DL
ALP BLD-CCNC: 75 U/L
ALT SERPL-CCNC: 29 U/L
ANION GAP SERPL CALC-SCNC: 20 MMOL/L
APPEARANCE: ABNORMAL
AST SERPL-CCNC: 24 U/L
BASOPHILS # BLD AUTO: 0.03 K/UL
BASOPHILS NFR BLD AUTO: 0.7 %
BILIRUB SERPL-MCNC: 0.3 MG/DL
BILIRUBIN URINE: NEGATIVE
BLOOD URINE: NEGATIVE
BUN SERPL-MCNC: 26 MG/DL
CALCIUM SERPL-MCNC: 9.5 MG/DL
CHLORIDE SERPL-SCNC: 105 MMOL/L
CHOLEST SERPL-MCNC: 251 MG/DL
CO2 SERPL-SCNC: 19 MMOL/L
COLOR: YELLOW
CREAT SERPL-MCNC: 1.21 MG/DL
EGFRCR SERPLBLD CKD-EPI 2021: 50 ML/MIN/1.73M2
EOSINOPHIL # BLD AUTO: 0.1 K/UL
EOSINOPHIL NFR BLD AUTO: 2.2 %
ESTIMATED AVERAGE GLUCOSE: 91 MG/DL
GLUCOSE QUALITATIVE U: NEGATIVE MG/DL
GLUCOSE SERPL-MCNC: 72 MG/DL
HBA1C MFR BLD HPLC: 4.8 %
HBV SURFACE AB SERPL IA-ACNC: <3.3 MIU/ML
HCT VFR BLD CALC: 42.4 %
HDLC SERPL-MCNC: 115 MG/DL
HEPATITIS A IGG ANTIBODY: REACTIVE
HGB BLD-MCNC: 12.8 G/DL
IMM GRANULOCYTES NFR BLD AUTO: 0.2 %
KETONES URINE: NEGATIVE MG/DL
LDLC SERPL-MCNC: 122 MG/DL
LEUKOCYTE ESTERASE URINE: NEGATIVE
LYMPHOCYTES # BLD AUTO: 1.4 K/UL
LYMPHOCYTES NFR BLD AUTO: 31.3 %
MAN DIFF?: NORMAL
MCHC RBC-ENTMCNC: 28.3 PG
MCHC RBC-ENTMCNC: 30.2 G/DL
MCV RBC AUTO: 93.6 FL
MEV IGG FLD QL IA: >300 AU/ML
MEV IGG+IGM SER-IMP: POSITIVE
MONOCYTES # BLD AUTO: 0.33 K/UL
MONOCYTES NFR BLD AUTO: 7.4 %
MUV AB SER-ACNC: POSITIVE
MUV IGG SER QL IA: 299 AU/ML
NEUTROPHILS # BLD AUTO: 2.61 K/UL
NEUTROPHILS NFR BLD AUTO: 58.2 %
NITRITE URINE: NEGATIVE
NONHDLC SERPL-MCNC: 136 MG/DL
PH URINE: 6
PLATELET # BLD AUTO: 297 K/UL
POTASSIUM SERPL-SCNC: 4.4 MMOL/L
PROT SERPL-MCNC: 6.6 G/DL
PROTEIN URINE: NEGATIVE MG/DL
RBC # BLD: 4.53 M/UL
RBC # FLD: 12.8 %
RUBV IGG FLD-ACNC: 11.9 INDEX
RUBV IGG SER-IMP: POSITIVE
SODIUM SERPL-SCNC: 144 MMOL/L
SPECIFIC GRAVITY URINE: 1.02
T4 FREE SERPL-MCNC: 1.2 NG/DL
TRIGL SERPL-MCNC: 84 MG/DL
TSH SERPL-ACNC: 0.64 UIU/ML
UROBILINOGEN URINE: 0.2 MG/DL
VZV AB TITR SER: POSITIVE
VZV IGG SER IF-ACNC: 22.2 S/CO
WBC # FLD AUTO: 4.48 K/UL

## 2025-06-12 LAB
HBV CORE IGG+IGM SER QL: NONREACTIVE
HBV SURFACE AG SER QL: NONREACTIVE

## 2025-06-24 ENCOUNTER — RX RENEWAL (OUTPATIENT)
Age: 65
End: 2025-06-24

## 2025-07-16 ENCOUNTER — RX RENEWAL (OUTPATIENT)
Age: 65
End: 2025-07-16

## 2025-08-04 ENCOUNTER — LABORATORY RESULT (OUTPATIENT)
Age: 65
End: 2025-08-04

## 2025-08-04 ENCOUNTER — NON-APPOINTMENT (OUTPATIENT)
Age: 65
End: 2025-08-04

## 2025-08-04 ENCOUNTER — APPOINTMENT (OUTPATIENT)
Dept: INTERNAL MEDICINE | Facility: CLINIC | Age: 65
End: 2025-08-04
Payer: COMMERCIAL

## 2025-08-04 VITALS — SYSTOLIC BLOOD PRESSURE: 120 MMHG | DIASTOLIC BLOOD PRESSURE: 70 MMHG

## 2025-08-04 VITALS — HEIGHT: 65 IN | BODY MASS INDEX: 31.65 KG/M2 | WEIGHT: 190 LBS

## 2025-08-04 DIAGNOSIS — R92.30 DENSE BREASTS, UNSPECIFIED: ICD-10-CM

## 2025-08-04 DIAGNOSIS — Z00.00 ENCOUNTER FOR GENERAL ADULT MEDICAL EXAMINATION W/OUT ABNORMAL FINDINGS: ICD-10-CM

## 2025-08-04 DIAGNOSIS — Z12.39 ENCOUNTER FOR OTHER SCREENING FOR MALIGNANT NEOPLASM OF BREAST: ICD-10-CM

## 2025-08-04 DIAGNOSIS — Z23 ENCOUNTER FOR IMMUNIZATION: ICD-10-CM

## 2025-08-04 LAB
ALBUMIN SERPL ELPH-MCNC: 4.2 G/DL
ALBUMIN, RANDOM URINE: <1.2 MG/DL
ALP BLD-CCNC: 69 U/L
ALT SERPL-CCNC: 34 U/L
ANION GAP SERPL CALC-SCNC: 12 MMOL/L
APPEARANCE: ABNORMAL
AST SERPL-CCNC: 29 U/L
BASOPHILS # BLD AUTO: 0.02 K/UL
BASOPHILS NFR BLD AUTO: 0.3 %
BILIRUB SERPL-MCNC: 0.5 MG/DL
BILIRUBIN URINE: NEGATIVE
BLOOD URINE: NEGATIVE
BUN SERPL-MCNC: 30 MG/DL
CALCIUM SERPL-MCNC: 9.4 MG/DL
CHLORIDE SERPL-SCNC: 102 MMOL/L
CHOLEST SERPL-MCNC: 260 MG/DL
CO2 SERPL-SCNC: 23 MMOL/L
COLOR: YELLOW
CREAT SERPL-MCNC: 1.34 MG/DL
CREAT SPEC-SCNC: 149 MG/DL
EGFRCR SERPLBLD CKD-EPI 2021: 45 ML/MIN/1.73M2
EOSINOPHIL # BLD AUTO: 0.09 K/UL
EOSINOPHIL NFR BLD AUTO: 1.5 %
ESTIMATED AVERAGE GLUCOSE: 88 MG/DL
FOLATE SERPL-MCNC: >20 NG/ML
GLUCOSE QUALITATIVE U: NEGATIVE MG/DL
GLUCOSE SERPL-MCNC: 80 MG/DL
HBA1C MFR BLD HPLC: 4.7 %
HCT VFR BLD CALC: 39.9 %
HDLC SERPL-MCNC: 140 MG/DL
HGB BLD-MCNC: 12.5 G/DL
IMM GRANULOCYTES NFR BLD AUTO: 0.2 %
KETONES URINE: NEGATIVE MG/DL
LDLC SERPL-MCNC: 107 MG/DL
LEUKOCYTE ESTERASE URINE: ABNORMAL
LYMPHOCYTES # BLD AUTO: 0.95 K/UL
LYMPHOCYTES NFR BLD AUTO: 15.5 %
MAN DIFF?: NORMAL
MCHC RBC-ENTMCNC: 29.6 PG
MCHC RBC-ENTMCNC: 31.3 G/DL
MCV RBC AUTO: 94.3 FL
MICROALBUMIN/CREAT 24H UR-RTO: NORMAL MG/G
MONOCYTES # BLD AUTO: 0.52 K/UL
MONOCYTES NFR BLD AUTO: 8.5 %
NEUTROPHILS # BLD AUTO: 4.55 K/UL
NEUTROPHILS NFR BLD AUTO: 74 %
NITRITE URINE: NEGATIVE
NONHDLC SERPL-MCNC: 121 MG/DL
PH URINE: 5.5
PLATELET # BLD AUTO: 247 K/UL
POTASSIUM SERPL-SCNC: 4.4 MMOL/L
PROT SERPL-MCNC: 6.8 G/DL
PROTEIN URINE: NEGATIVE MG/DL
RBC # BLD: 4.23 M/UL
RBC # FLD: 13.9 %
SODIUM SERPL-SCNC: 138 MMOL/L
SPECIFIC GRAVITY URINE: 1.02
T4 FREE SERPL-MCNC: 1.2 NG/DL
TRIGL SERPL-MCNC: 86 MG/DL
TSH SERPL-ACNC: 0.56 UIU/ML
UROBILINOGEN URINE: 0.2 MG/DL
VIT B12 SERPL-MCNC: 640 PG/ML
WBC # FLD AUTO: 6.14 K/UL

## 2025-08-04 PROCEDURE — G0010: CPT

## 2025-08-04 PROCEDURE — 90746 HEPB VACCINE 3 DOSE ADULT IM: CPT

## 2025-08-04 PROCEDURE — 99396 PREV VISIT EST AGE 40-64: CPT | Mod: 25

## 2025-08-04 PROCEDURE — 93000 ELECTROCARDIOGRAM COMPLETE: CPT

## 2025-08-04 PROCEDURE — 36415 COLL VENOUS BLD VENIPUNCTURE: CPT

## 2025-08-29 DIAGNOSIS — E78.89 OTHER LIPOPROTEIN METABOLISM DISORDERS: ICD-10-CM

## 2025-09-04 ENCOUNTER — APPOINTMENT (OUTPATIENT)
Dept: INTERNAL MEDICINE | Facility: CLINIC | Age: 65
End: 2025-09-04
Payer: COMMERCIAL

## 2025-09-04 PROCEDURE — G0010: CPT

## 2025-09-04 PROCEDURE — 90740 HEPB VACC 3 DOSE IMMUNSUP IM: CPT

## 2025-09-06 DIAGNOSIS — Z29.89 ENCOUNTER. FOR OTHER SPECIFIED PROPHYLACTIC MEASURES: ICD-10-CM

## 2025-09-06 LAB
ANION GAP SERPL CALC-SCNC: 11 MMOL/L
BASOPHILS # BLD AUTO: 0.04 K/UL
BASOPHILS NFR BLD AUTO: 0.7 %
BUN SERPL-MCNC: 20 MG/DL
CALCIUM SERPL-MCNC: 9.5 MG/DL
CHLORIDE SERPL-SCNC: 104 MMOL/L
CO2 SERPL-SCNC: 26 MMOL/L
CREAT SERPL-MCNC: 1.02 MG/DL
EGFRCR SERPLBLD CKD-EPI 2021: 62 ML/MIN/1.73M2
EOSINOPHIL # BLD AUTO: 0.1 K/UL
EOSINOPHIL NFR BLD AUTO: 1.6 %
GLUCOSE SERPL-MCNC: 81 MG/DL
HCT VFR BLD CALC: 41.2 %
HDLC SERPL-MCNC: 118 MG/DL
HGB BLD-MCNC: 12.2 G/DL
IMM GRANULOCYTES NFR BLD AUTO: 0.2 %
LYMPHOCYTES # BLD AUTO: 1.39 K/UL
LYMPHOCYTES NFR BLD AUTO: 22.8 %
MAN DIFF?: NORMAL
MCHC RBC-ENTMCNC: 28.7 PG
MCHC RBC-ENTMCNC: 29.6 G/DL
MCV RBC AUTO: 96.9 FL
MONOCYTES # BLD AUTO: 0.49 K/UL
MONOCYTES NFR BLD AUTO: 8 %
NEUTROPHILS # BLD AUTO: 4.07 K/UL
NEUTROPHILS NFR BLD AUTO: 66.7 %
PLATELET # BLD AUTO: 266 K/UL
POTASSIUM SERPL-SCNC: 4.5 MMOL/L
RBC # BLD: 4.25 M/UL
RBC # FLD: 13.3 %
SODIUM SERPL-SCNC: 141 MMOL/L
WBC # FLD AUTO: 6.1 K/UL

## 2025-09-06 RX ORDER — ATOVAQUONE AND PROGUANIL HYDROCHLORIDE 250; 100 MG/1; MG/1
250-100 TABLET, FILM COATED ORAL DAILY
Qty: 40 | Refills: 0 | Status: ACTIVE | COMMUNITY
Start: 2025-09-06 | End: 1900-01-01

## (undated) DEVICE — XI ARM PERMANENT CAUTERY HOOK

## (undated) DEVICE — XI ARM FORCEP FENESTRATED BIPOLAR 8MM

## (undated) DEVICE — TUBING SUCTION NONCONDUCTIVE 6MM X 12FT

## (undated) DEVICE — POSITIONER PURPLE ARM ONE STEP (LARGE)

## (undated) DEVICE — XI ARM NEEDLE DRIVER LARGE

## (undated) DEVICE — PROTECTOR HEEL / ELBOW

## (undated) DEVICE — UTERINE MANIPULATOR CONMED VCARE LG 37MM

## (undated) DEVICE — GLV 7 PROTEXIS (WHITE)

## (undated) DEVICE — ELCTR CUTTING LOOP RIGHT ANGLE 24FR

## (undated) DEVICE — DRAPE LIGHT HANDLE COVER (GREEN)

## (undated) DEVICE — VENODYNE/SCD SLEEVE CALF MEDIUM

## (undated) DEVICE — SOL IRR POUR NS 0.9% 1000ML

## (undated) DEVICE — WARMING BLANKET FULL ADULT

## (undated) DEVICE — XI ARM FORCEP TENACULUM

## (undated) DEVICE — TUBING IV SET GRAVITY 3Y 100" MACRO

## (undated) DEVICE — CATH IV SAFE BC 20G X 1.16" (PINK)

## (undated) DEVICE — VISITEC 4X4

## (undated) DEVICE — UTERINE MANIPULATOR CONMED VCARE SM 32MM

## (undated) DEVICE — PACK PERI GYN

## (undated) DEVICE — GOWN XL

## (undated) DEVICE — XI TIP COVER

## (undated) DEVICE — SYR LUER LOK 10CC

## (undated) DEVICE — DRSG PAD SANITARY OB

## (undated) DEVICE — XI DRAPE COLUMN

## (undated) DEVICE — LUBRICATING JELLY ONESHOT 1.25OZ

## (undated) DEVICE — POLY TRAP ETRAP

## (undated) DEVICE — PROTECTOR HEEL / ELBOW FLUFFY

## (undated) DEVICE — ELCTR BOVIE TIP BLADE INSULATED 2.75" EDGE

## (undated) DEVICE — TUBING SUCTION CONN 6FT STERILE

## (undated) DEVICE — DRSG STERISTRIPS 0.5 X 4"

## (undated) DEVICE — DRAPE IRRIGATION POUCH 19X23"

## (undated) DEVICE — SOL IRR BAG NS 0.9% 3000ML

## (undated) DEVICE — XI ARM FORCEP MARYLAND BIPOLAR

## (undated) DEVICE — POSITIONER STRAP ARMBOARD VELCRO TS-30

## (undated) DEVICE — DRSG TELFA 3 X 8

## (undated) DEVICE — SYMPHION 3.6MM RESECTING DEVICE

## (undated) DEVICE — D HELP - CLEARVIEW CLEARIFY SYSTEM

## (undated) DEVICE — PACK IV START WITH CHG

## (undated) DEVICE — TUBING AIRSEAL TRI-LUMEN FILTERED

## (undated) DEVICE — PREP BETADINE SPONGE STICKS

## (undated) DEVICE — GLV 5.5 PROTEXIS (WHITE)

## (undated) DEVICE — FOLEY TRAY 16FR 5CC LF UMETER CLOSED

## (undated) DEVICE — FOLEY HOLDER STATLOCK 2 WAY ADULT

## (undated) DEVICE — UTERINE MANIPULATOR CONMED VCARE MED 34MM

## (undated) DEVICE — CATH IV SAFE BC 22G X 1" (BLUE)

## (undated) DEVICE — SOL INJ NS 0.9% 500ML 2 PORT

## (undated) DEVICE — IRRIGATOR BIO SHIELD

## (undated) DEVICE — DRAPE TOWEL BLUE 17" X 24"

## (undated) DEVICE — SYMPHION FLUID MANAGEMENT DEVICE

## (undated) DEVICE — XI ARM GRASPER TIP UP FENESTRATED

## (undated) DEVICE — Device

## (undated) DEVICE — DRSG OPSITE 13.75 X 4"

## (undated) DEVICE — SUT MONOCRYL 4-0 27" PS-2 UNDYED

## (undated) DEVICE — ELCTR BOVIE PENCIL SMOKE EVACUATION

## (undated) DEVICE — TROCAR SURGIQUEST AIRSEAL 8MMX100MM

## (undated) DEVICE — XI DRAPE ARM

## (undated) DEVICE — TUBING SUCTION 20FT

## (undated) DEVICE — PACK D&C

## (undated) DEVICE — PRESSURE INFUSOR BAG 1000ML

## (undated) DEVICE — ELCTR GROUNDING PAD ADULT COVIDIEN

## (undated) DEVICE — CLAMP BX HOT RAD JAW 3

## (undated) DEVICE — POSITIONER FOAM HEAD CRADLE (PINK)

## (undated) DEVICE — XI ARM FORCEP PROGRASP 8MM

## (undated) DEVICE — COLONOSCOPE 2416901: Type: DURABLE MEDICAL EQUIPMENT

## (undated) DEVICE — ELCTR REM POLYHESIVE ADULT PT RETURN 15FT

## (undated) DEVICE — ENDOCATCH 10MM SPECIMEN POUCH

## (undated) DEVICE — POSITIONER PINK PAD PIGAZZI SYSTEM

## (undated) DEVICE — SYR LUER LOK 50CC

## (undated) DEVICE — SNARE CAPTIVATOR COLD RND STIFF 10X2.4X2.8MM 240CM

## (undated) DEVICE — BIOPSY FORCEP RADIAL JAW 4 STANDARD WITH NEEDLE

## (undated) DEVICE — TUBING CAP SET ENDO 24HR USE GI

## (undated) DEVICE — XI OBTURATOR OPTICAL BLADELESS 8MM

## (undated) DEVICE — SUCTION YANKAUER NO CONTROL VENT

## (undated) DEVICE — GOWN LG

## (undated) DEVICE — FORCEP RADIAL JAW 4 JUMBO 2.8MM 3.2MM 240CM ORANGE DISP

## (undated) DEVICE — BRUSH COLONOSCOPY CYTOLOGY

## (undated) DEVICE — TUBING STRYKEFLOW II SUCTION / IRRIGATOR

## (undated) DEVICE — TUBING STRYKER HYSTEROSCOPY INFLOW OUTFLOW

## (undated) DEVICE — CABLE DAC ACTIVE CORD

## (undated) DEVICE — XI ARM SCISSOR MONO CURVED

## (undated) DEVICE — XI ARM PERMANENT CAUTERY SPATULA

## (undated) DEVICE — LABELS BLANK W PEN

## (undated) DEVICE — PACK ROBOTIC LIJ

## (undated) DEVICE — SENSOR O2 FINGER ADULT

## (undated) DEVICE — BASIN SET DOUBLE

## (undated) DEVICE — DRAPE UNDER BUTTOCKS W SCREEN

## (undated) DEVICE — GOWN XXXL

## (undated) DEVICE — UTERINE MANIPULATOR MPM MEDICAL ZUMI 4.5MM

## (undated) DEVICE — SUT VLOC 180 3-0 9" V-20 GREEN

## (undated) DEVICE — TUBING IRR SET FOR CYSTOSCOPY 77"

## (undated) DEVICE — SUT VICRYL 0 27" UR-6

## (undated) DEVICE — SUT VICRYL 0 27" CT-2 UNDYED

## (undated) DEVICE — XI SEAL UNIVERSIAL 5-12MM

## (undated) DEVICE — ENDOCUFF VISION SZ 2 LG GRN

## (undated) DEVICE — GLV 6 PROTEXIS (WHITE)